# Patient Record
Sex: FEMALE | Race: BLACK OR AFRICAN AMERICAN | NOT HISPANIC OR LATINO | Employment: OTHER | RURAL
[De-identification: names, ages, dates, MRNs, and addresses within clinical notes are randomized per-mention and may not be internally consistent; named-entity substitution may affect disease eponyms.]

---

## 2020-07-23 ENCOUNTER — HISTORICAL (OUTPATIENT)
Dept: ADMINISTRATIVE | Facility: HOSPITAL | Age: 50
End: 2020-07-23

## 2020-08-08 ENCOUNTER — HISTORICAL (OUTPATIENT)
Dept: ADMINISTRATIVE | Facility: HOSPITAL | Age: 50
End: 2020-08-08

## 2020-08-09 LAB — SARS-COV+SARS-COV-2 AG RESP QL IA.RAPID: POSITIVE

## 2020-10-26 ENCOUNTER — HISTORICAL (OUTPATIENT)
Dept: ADMINISTRATIVE | Facility: HOSPITAL | Age: 50
End: 2020-10-26

## 2020-10-26 LAB
ANION GAP SERPL CALCULATED.3IONS-SCNC: 6 MMOL/L (ref 7–16)
BASOPHILS # BLD AUTO: 0.03 X10E3/UL (ref 0–0.2)
BASOPHILS NFR BLD AUTO: 0.6 % (ref 0–1)
BUN SERPL-MCNC: 13 MG/DL (ref 7–18)
CALCIUM SERPL-MCNC: 8.8 MG/DL (ref 8.5–10.1)
CHLORIDE SERPL-SCNC: 108 MMOL/L (ref 98–107)
CO2 SERPL-SCNC: 32 MMOL/L (ref 21–32)
CREAT SERPL-MCNC: 0.92 MG/DL (ref 0.5–1.02)
EOSINOPHIL # BLD AUTO: 0.11 X10E3/UL (ref 0–0.5)
EOSINOPHIL NFR BLD AUTO: 2.2 % (ref 1–4)
ERYTHROCYTE [DISTWIDTH] IN BLOOD BY AUTOMATED COUNT: 13.8 % (ref 11.5–14.5)
GLUCOSE SERPL-MCNC: 78 MG/DL (ref 74–106)
HCT VFR BLD AUTO: 39.1 % (ref 38–47)
HGB BLD-MCNC: 12.3 G/DL (ref 12–16)
IMM GRANULOCYTES # BLD AUTO: 0.02 X10E3/UL (ref 0–0.04)
IMM GRANULOCYTES NFR BLD: 0.4 % (ref 0–0.4)
LYMPHOCYTES # BLD AUTO: 1.57 X10E3/UL (ref 1–4.8)
LYMPHOCYTES NFR BLD AUTO: 32 % (ref 27–41)
MCH RBC QN AUTO: 27.5 PG (ref 27–31)
MCHC RBC AUTO-ENTMCNC: 31.5 G/DL (ref 32–36)
MCV RBC AUTO: 87.5 FL (ref 80–96)
MONOCYTES # BLD AUTO: 0.3 X10E3/UL (ref 0–0.8)
MONOCYTES NFR BLD AUTO: 6.1 % (ref 2–6)
MPC BLD CALC-MCNC: 11.2 FL (ref 9.4–12.4)
NEUTROPHILS # BLD AUTO: 2.88 X10E3/UL (ref 1.8–7.7)
NEUTROPHILS NFR BLD AUTO: 58.7 % (ref 53–65)
NRBC # BLD AUTO: 0 X10E3/UL (ref 0–0)
NRBC, AUTO (.00): 0 /100 (ref 0–0)
PLATELET # BLD AUTO: 225 X10E3/UL (ref 150–400)
POTASSIUM SERPL-SCNC: 3.8 MMOL/L (ref 3.5–5.1)
RBC # BLD AUTO: 4.47 X10E6/UL (ref 4.2–5.4)
SODIUM SERPL-SCNC: 142 MMOL/L (ref 136–145)
TSH SERPL DL<=0.005 MIU/L-ACNC: 0.81 UIU/ML (ref 0.36–3.74)
WBC # BLD AUTO: 4.91 X10E3/UL (ref 4.5–11)

## 2021-01-14 ENCOUNTER — HISTORICAL (OUTPATIENT)
Dept: ADMINISTRATIVE | Facility: HOSPITAL | Age: 51
End: 2021-01-14

## 2021-01-16 LAB
REPORT: NO GROWTH
REPORT: NORMAL

## 2021-04-07 ENCOUNTER — HISTORICAL (OUTPATIENT)
Dept: ADMINISTRATIVE | Facility: HOSPITAL | Age: 51
End: 2021-04-07

## 2021-04-07 LAB
25(OH)D3 SERPL-MCNC: 12.1 NG/ML
ALBUMIN SERPL BCP-MCNC: 3.8 G/DL (ref 3.5–5)
ALP SERPL-CCNC: 81 U/L (ref 41–108)
ALT SERPL W P-5'-P-CCNC: 27 U/L (ref 13–56)
ANION GAP SERPL CALCULATED.3IONS-SCNC: 10 MMOL/L (ref 7–16)
AST SERPL W P-5'-P-CCNC: 17 U/L (ref 15–37)
BASOPHILS # BLD AUTO: 0.03 X10E3/UL (ref 0–0.2)
BASOPHILS NFR BLD AUTO: 0.7 % (ref 0–1)
BILIRUB DIRECT SERPL-MCNC: 0.1 MG/DL (ref 0–0.2)
BILIRUB SERPL-MCNC: 0.4 MG/DL (ref 0–1.2)
BUN SERPL-MCNC: 17 MG/DL (ref 7–18)
CALCIUM SERPL-MCNC: 8.8 MG/DL (ref 8.5–10.1)
CHLORIDE SERPL-SCNC: 107 MMOL/L (ref 98–107)
CHOLEST SERPL-MCNC: 243 MG/DL
CHOLEST/HDLC SERPL: 3 {RATIO}
CO2 SERPL-SCNC: 29 MMOL/L (ref 21–32)
CREAT SERPL-MCNC: 0.89 MG/DL (ref 0.5–1.02)
EOSINOPHIL # BLD AUTO: 0.07 X10E3/UL (ref 0–0.5)
EOSINOPHIL NFR BLD AUTO: 1.6 % (ref 1–4)
ERYTHROCYTE [DISTWIDTH] IN BLOOD BY AUTOMATED COUNT: 14.2 % (ref 11.5–14.5)
GLUCOSE SERPL-MCNC: 79 MG/DL (ref 74–106)
HCT VFR BLD AUTO: 40.8 % (ref 38–47)
HDLC SERPL-MCNC: 80 MG/DL
HGB BLD-MCNC: 12.9 G/DL (ref 12–16)
IMM GRANULOCYTES # BLD AUTO: 0 X10E3/UL (ref 0–0.04)
IMM GRANULOCYTES NFR BLD: 0 % (ref 0–0.4)
LDLC SERPL CALC-MCNC: 150 MG/DL
LYMPHOCYTES # BLD AUTO: 1.79 X10E3/UL (ref 1–4.8)
LYMPHOCYTES NFR BLD AUTO: 40.8 % (ref 27–41)
MCH RBC QN AUTO: 27.5 PG (ref 27–31)
MCHC RBC AUTO-ENTMCNC: 31.6 G/DL (ref 32–36)
MCV RBC AUTO: 87 FL (ref 80–96)
MONOCYTES # BLD AUTO: 0.31 X10E3/UL (ref 0–0.8)
MONOCYTES NFR BLD AUTO: 7.1 % (ref 2–6)
MPC BLD CALC-MCNC: 11.3 FL (ref 9.4–12.4)
NEUTROPHILS # BLD AUTO: 2.19 X10E3/UL (ref 1.8–7.7)
NEUTROPHILS NFR BLD AUTO: 49.8 % (ref 53–65)
NRBC # BLD AUTO: 0 X10E3/UL (ref 0–0)
NRBC, AUTO (.00): 0 /100 (ref 0–0)
PLATELET # BLD AUTO: 257 X10E3/UL (ref 150–400)
POTASSIUM SERPL-SCNC: 3.7 MMOL/L (ref 3.5–5.1)
PROT SERPL-MCNC: 8.1 G/DL (ref 6.4–8.2)
RBC # BLD AUTO: 4.69 X10E6/UL (ref 4.2–5.4)
SODIUM SERPL-SCNC: 142 MMOL/L (ref 136–145)
TRIGL SERPL-MCNC: 67 MG/DL
TSH SERPL DL<=0.005 MIU/L-ACNC: 0.92 UIU/ML (ref 0.36–3.74)
VIT B12 SERPL-MCNC: 862 PG/ML (ref 193–986)
WBC # BLD AUTO: 4.39 X10E3/UL (ref 4.5–11)

## 2021-04-09 LAB
REPORT: NORMAL

## 2021-07-08 ENCOUNTER — OFFICE VISIT (OUTPATIENT)
Dept: FAMILY MEDICINE | Facility: CLINIC | Age: 51
End: 2021-07-08
Payer: COMMERCIAL

## 2021-07-08 VITALS
WEIGHT: 237 LBS | BODY MASS INDEX: 35.92 KG/M2 | HEIGHT: 68 IN | OXYGEN SATURATION: 99 % | DIASTOLIC BLOOD PRESSURE: 94 MMHG | TEMPERATURE: 97 F | HEART RATE: 62 BPM | SYSTOLIC BLOOD PRESSURE: 132 MMHG

## 2021-07-08 DIAGNOSIS — M15.9 GENERALIZED OA: ICD-10-CM

## 2021-07-08 DIAGNOSIS — J01.01 ACUTE RECURRENT MAXILLARY SINUSITIS: Primary | ICD-10-CM

## 2021-07-08 PROCEDURE — 99213 OFFICE O/P EST LOW 20 MIN: CPT | Mod: 25,,, | Performed by: FAMILY MEDICINE

## 2021-07-08 PROCEDURE — 96372 THER/PROPH/DIAG INJ SC/IM: CPT | Mod: ,,, | Performed by: FAMILY MEDICINE

## 2021-07-08 PROCEDURE — 99213 PR OFFICE/OUTPT VISIT, EST, LEVL III, 20-29 MIN: ICD-10-PCS | Mod: 25,,, | Performed by: FAMILY MEDICINE

## 2021-07-08 PROCEDURE — 96372 PR INJECTION,THERAP/PROPH/DIAG2ST, IM OR SUBCUT: ICD-10-PCS | Mod: ,,, | Performed by: FAMILY MEDICINE

## 2021-07-08 RX ORDER — CEFUROXIME AXETIL 250 MG/1
250 TABLET ORAL EVERY 12 HOURS
Qty: 20 TABLET | Refills: 0 | Status: SHIPPED | OUTPATIENT
Start: 2021-07-08

## 2021-07-08 RX ORDER — KETOROLAC TROMETHAMINE 30 MG/ML
60 INJECTION, SOLUTION INTRAMUSCULAR; INTRAVENOUS
Status: COMPLETED | OUTPATIENT
Start: 2021-07-08 | End: 2021-07-08

## 2021-07-08 RX ORDER — LISINOPRIL AND HYDROCHLOROTHIAZIDE 10; 12.5 MG/1; MG/1
1 TABLET ORAL DAILY
COMMUNITY
Start: 2021-03-09

## 2021-07-08 RX ORDER — METHYLPREDNISOLONE ACETATE 80 MG/ML
40 INJECTION, SUSPENSION INTRA-ARTICULAR; INTRALESIONAL; INTRAMUSCULAR; SOFT TISSUE
Status: COMPLETED | OUTPATIENT
Start: 2021-07-08 | End: 2021-07-08

## 2021-07-08 RX ORDER — TRAZODONE HYDROCHLORIDE 50 MG/1
1 TABLET ORAL NIGHTLY
COMMUNITY
Start: 2021-06-04

## 2021-07-08 RX ORDER — DEXAMETHASONE SODIUM PHOSPHATE 4 MG/ML
4 INJECTION, SOLUTION INTRA-ARTICULAR; INTRALESIONAL; INTRAMUSCULAR; INTRAVENOUS; SOFT TISSUE
Status: COMPLETED | OUTPATIENT
Start: 2021-07-08 | End: 2021-07-08

## 2021-07-08 RX ADMIN — METHYLPREDNISOLONE ACETATE 40 MG: 80 INJECTION, SUSPENSION INTRA-ARTICULAR; INTRALESIONAL; INTRAMUSCULAR; SOFT TISSUE at 09:07

## 2021-07-08 RX ADMIN — KETOROLAC TROMETHAMINE 60 MG: 30 INJECTION, SOLUTION INTRAMUSCULAR; INTRAVENOUS at 09:07

## 2021-07-08 RX ADMIN — DEXAMETHASONE SODIUM PHOSPHATE 4 MG: 4 INJECTION, SOLUTION INTRA-ARTICULAR; INTRALESIONAL; INTRAMUSCULAR; INTRAVENOUS; SOFT TISSUE at 09:07

## 2021-08-16 ENCOUNTER — HOSPITAL ENCOUNTER (OUTPATIENT)
Dept: RADIOLOGY | Facility: HOSPITAL | Age: 51
Discharge: HOME | End: 2021-08-16
Payer: COMMERCIAL

## 2021-08-16 VITALS — WEIGHT: 237 LBS | BODY MASS INDEX: 35.92 KG/M2 | HEIGHT: 68 IN

## 2021-08-16 DIAGNOSIS — Z12.31 VISIT FOR SCREENING MAMMOGRAM: ICD-10-CM

## 2021-08-16 PROCEDURE — 77067 SCR MAMMO BI INCL CAD: CPT | Mod: TC

## 2022-11-16 DIAGNOSIS — K21.9 GASTROESOPHAGEAL REFLUX DISEASE WITHOUT ESOPHAGITIS: Primary | ICD-10-CM

## 2022-11-16 DIAGNOSIS — K21.9 GASTROESOPHAGEAL REFLUX DISEASE, UNSPECIFIED WHETHER ESOPHAGITIS PRESENT: Primary | ICD-10-CM

## 2022-11-16 DIAGNOSIS — R11.0 NAUSEA: ICD-10-CM

## 2022-11-18 ENCOUNTER — HOSPITAL ENCOUNTER (OUTPATIENT)
Dept: RADIOLOGY | Facility: HOSPITAL | Age: 52
Discharge: HOME | End: 2022-11-18
Attending: FAMILY MEDICINE
Payer: COMMERCIAL

## 2022-11-18 DIAGNOSIS — R11.0 NAUSEA: ICD-10-CM

## 2022-11-18 DIAGNOSIS — K21.9 GASTROESOPHAGEAL REFLUX DISEASE WITHOUT ESOPHAGITIS: ICD-10-CM

## 2022-11-18 PROCEDURE — 76705 ECHO EXAM OF ABDOMEN: CPT | Mod: TC

## 2023-02-06 DIAGNOSIS — M25.511 CHRONIC RIGHT SHOULDER PAIN: Primary | ICD-10-CM

## 2023-02-06 DIAGNOSIS — G89.29 CHRONIC RIGHT SHOULDER PAIN: Primary | ICD-10-CM

## 2023-03-03 DIAGNOSIS — M25.511 RIGHT SHOULDER PAIN, UNSPECIFIED CHRONICITY: Primary | ICD-10-CM

## 2023-03-07 ENCOUNTER — HOSPITAL ENCOUNTER (OUTPATIENT)
Dept: RADIOLOGY | Facility: HOSPITAL | Age: 53
Discharge: HOME | End: 2023-03-07
Attending: ORTHOPAEDIC SURGERY
Payer: COMMERCIAL

## 2023-03-07 ENCOUNTER — OFFICE VISIT (OUTPATIENT)
Dept: ORTHOPEDICS | Facility: CLINIC | Age: 53
End: 2023-03-07
Payer: COMMERCIAL

## 2023-03-07 VITALS — BODY MASS INDEX: 33.18 KG/M2 | WEIGHT: 237 LBS | HEIGHT: 71 IN

## 2023-03-07 DIAGNOSIS — G89.29 CHRONIC RIGHT SHOULDER PAIN: ICD-10-CM

## 2023-03-07 DIAGNOSIS — M25.511 RIGHT SHOULDER PAIN, UNSPECIFIED CHRONICITY: ICD-10-CM

## 2023-03-07 DIAGNOSIS — M75.40 IMPINGEMENT SYNDROME OF SHOULDER REGION, UNSPECIFIED LATERALITY: Primary | ICD-10-CM

## 2023-03-07 DIAGNOSIS — M25.511 CHRONIC RIGHT SHOULDER PAIN: ICD-10-CM

## 2023-03-07 PROCEDURE — 73030 X-RAY EXAM OF SHOULDER: CPT | Mod: 26,RT,, | Performed by: ORTHOPAEDIC SURGERY

## 2023-03-07 PROCEDURE — 99204 OFFICE O/P NEW MOD 45 MIN: CPT | Mod: S$PBB,,, | Performed by: ORTHOPAEDIC SURGERY

## 2023-03-07 PROCEDURE — 4010F PR ACE/ARB THEARPY RXD/TAKEN: ICD-10-PCS | Mod: CPTII,,, | Performed by: ORTHOPAEDIC SURGERY

## 2023-03-07 PROCEDURE — 4010F ACE/ARB THERAPY RXD/TAKEN: CPT | Mod: CPTII,,, | Performed by: ORTHOPAEDIC SURGERY

## 2023-03-07 PROCEDURE — 3008F BODY MASS INDEX DOCD: CPT | Mod: CPTII,,, | Performed by: ORTHOPAEDIC SURGERY

## 2023-03-07 PROCEDURE — 3008F PR BODY MASS INDEX (BMI) DOCUMENTED: ICD-10-PCS | Mod: CPTII,,, | Performed by: ORTHOPAEDIC SURGERY

## 2023-03-07 PROCEDURE — 73030 XR SHOULDER COMPLETE 2 OR MORE VIEWS RIGHT: ICD-10-PCS | Mod: 26,RT,, | Performed by: ORTHOPAEDIC SURGERY

## 2023-03-07 PROCEDURE — 99213 OFFICE O/P EST LOW 20 MIN: CPT | Mod: PBBFAC | Performed by: ORTHOPAEDIC SURGERY

## 2023-03-07 PROCEDURE — 99204 PR OFFICE/OUTPT VISIT, NEW, LEVL IV, 45-59 MIN: ICD-10-PCS | Mod: S$PBB,,, | Performed by: ORTHOPAEDIC SURGERY

## 2023-03-07 PROCEDURE — 73030 X-RAY EXAM OF SHOULDER: CPT | Mod: TC,RT

## 2023-03-07 RX ORDER — NAPROXEN 500 MG/1
500 TABLET ORAL 2 TIMES DAILY WITH MEALS
Qty: 60 TABLET | Refills: 3 | Status: SHIPPED | OUTPATIENT
Start: 2023-03-07

## 2023-03-07 NOTE — PATIENT INSTRUCTIONS
"    Shoulder Impingement/Rotator Cuff Tendinitis  One of the most common physical complaints is shoulder pain. Your shoulder is made up of several joints combined with tendons and muscles that allow a great range of motion in your arm. Because so many different structures make up the shoulder, it is vulnerable to many different problems. The rotator cuff is a frequent source of pain in the shoulder.    Anatomy  Your shoulder is made up of three bones: your upper arm bone (humerus), your shoulder blade (scapula), and your collarbone (clavicle).  Your arm is kept in your shoulder socket by your rotator cuff. These muscles and tendons form a covering around the head of your upper arm bone and attach it to your shoulder blade.  There is a lubricating sac called a bursa between the rotator cuff and the bone on top of your shoulder (acromion). The bursa allows the rotator cuff tendons to glide freely when you move your arm.                                                                               Normal anatomy of the shoulder.     Description  The rotator cuff is a common source of pain in the shoulder. Pain can be the result of:  Tendinitis. The rotator cuff tendons can be irritated or damaged.   Bursitis. The bursa can become inflamed and swell with more fluid causing pain.   Impingement. When you raise your arm to shoulder height, the space between the acromion and rotator cuff narrows. The acromion can rub against (or "impinge" on) the tendon and the bursa, causing irritation and pain.                                                           The acromion "impinges" on the rotator cuff and bursa.     Cause  Rotator cuff pain is common in both young athletes and middle-aged people. Young athletes who use their arms overhead for swimming, baseball, and tennis are particularly vulnerable. Those who do repetitive lifting or overhead activities using the arm, such as paper hanging, construction, or painting are also " "susceptible.  Pain may also develop as the result of a minor injury. Sometimes, it occurs with no apparent cause.  Symptoms  Rotator cuff pain commonly causes local swelling and tenderness in the front of the shoulder. You may have pain and stiffness when you lift your arm. There may also be pain when the arm is lowered from an elevated position.  Beginning symptoms may be mild. Patients frequently do not seek treatment at an early stage. These symptoms may include:  Minor pain that is present both with activity and at rest   Pain radiating from the front of the shoulder to the side of the arm   Sudden pain with lifting and reaching movements   Athletes in overhead sports may have pain when throwing or serving a tennis ball   As the problem progresses, the symptoms increase:  Pain at night   Loss of strength and motion   Difficulty doing activities that place the arm behind the back, such as buttoning or zippering   If the pain comes on suddenly, the shoulder may be severely tender. All movement may be limited and painful.  Doctor Examination  Medical History and Physical Examination                    Your doctor will test your range of motion by having you move your arm in different directions.   Reproduced with permission from YOLI Lopez, ed: Essentials of Musculoskeletal Care, ed 4. [place], [place], [employer], 2010.     After discussing your symptoms and medical history, your doctor will examine your shoulder. He or she will check to see whether it is tender in any area or whether there is a deformity. To measure the range of motion of your shoulder, your doctor will have you move your arm in several different directions. He or she will also test your arm strength.  Your doctor will check for other problems with your shoulder joint. He or she may also examine your neck to make sure that the pain is not coming from a "pinched nerve," and to rule out other conditions, such as " "arthritis.  Imaging Tests  Other tests which may help your doctor confirm your diagnosis include:  X-rays. Becauses x-rays do not show the soft tissues of your shoulder like the rotator cuff, plain x-rays of a shoulder with rotator cuff pain are usually normal or may show a small bone spur. A special x-ray view, called an "outlet view," sometimes will show a small bone spur on the front edge of the acromion.    (Left) Normal outlet view x-ray. (Right) Abnormal outlet view showing a large bone spur causing impingement on the rotator cuff.   Magnetic resonance imaging (MRI) and ultrasound. These studies can create better images of soft tissues like the rotator cuff tendons. They can show fluid or inflammation in the bursa and rotator cuff. In some cases, partial tearing of the rotator cuff will be seen.  Treatment  The goal of treatment is to reduce pain and restore function. In planning your treatment, your doctor will consider your age, activity level, and general health.  Nonsurgical Treatment  In most cases, initial treatment is nonsurgical. Although nonsurgical treatment may take several weeks to months, many patients experience a gradual improvement and return to function.  Rest. Your doctor may suggest rest and activity modification, such as avoiding overhead activities.  Non-steroidal anti-inflammatory medicines. Drugs like ibuprofen and naproxen reduce pain and swelling.  Physical therapy. A physical therapist will initially focus on restoring normal motion to your shoulder. Stretching exercises to improve range of motion are very helpful. If you have difficulty reaching behind your back, you may have developed tightness of the posterior capsule of the shoulder (capsule refers to the inner lining of the shoulder and posterior refers to the back of the shoulder). Specific stretching of the posterior capsule can be very effective in relieving pain in the shoulder.  Once your pain is improving, your therapist " can start you on a strengthening program for the rotator cuff muscles.  Steroid injection. If rest, medications, and physical therapy do not relieve your pain, an injection of a local anesthetic and a cortisone preparation may be helpful. Cortisone is a very effective anti-inflammatory medicine. Injecting it into the bursa beneath the acromion can relieve pain.                                                     A cortisone injection may relieve painful symptoms.   Reproduced with permission from YOLI Lopez, ed: Essentials of Musculoskeletal Care, ed 4. [place], [employer], 2010.   Surgical Treatment  When nonsurgical treatment does not relieve pain, your doctor may recommend surgery.  The goal of surgery is to create more space for the rotator cuff. To do this, your doctor will remove the inflamed portion of the bursa. He or she may also perform an anterior acromioplasty, in which part of the acromion is removed. This is also known as a subacromial decompression. These procedures can be performed using either an arthroscopic or open technique.  Arthroscopic technique. In arthroscopy, thin surgical instruments are inserted into two or three small puncture wounds around your shoulder. Your doctor examines your shoulder through a fiberoptic scope connected to a television camera. He or she guides the small instruments using a video monitor, and removes bone and soft tissue. In most cases, the front edge of the acromion is removed along with some of the bursal tissue.  Your surgeon may also treat other conditions present in the shoulder at the time of surgery. These can include arthritis between the clavicle (collarbone) and the acromion (acromioclavicular arthritis), inflammation of the biceps tendon (biceps tendonitis), or a partial rotator cuff tear.  Open surgical technique. In open surgery, your doctor will make a small incision in the front of your shoulder. This allows your  doctor to see the acromion and rotator cuff directly.                          Anterior acromioplasty techniques. (Left) Arthroscopic repair. (Right) Open surgical procedure.   Rehabilitation. After surgery, your arm may be placed in a sling for a short period of time. This allows for early healing. As soon as your comfort allows, your doctor will remove the sling to begin exercise and use of the arm.  Your doctor will provide a rehabilitation program based on your needs and the findings at surgery. This will include exercises to regain range of motion of the shoulder and strength of the arm. It typically takes 2 to 4 months to achieve complete relief of pain, but it may take up to a year.    Rotator Cuff Exercises      Before you start  The exercises described below can help you strengthen the muscles in your shoulder (especially the muscles of the rotator cuff--the part that helps circular motion). These exercises should not cause you pain. If you feel any pain, stop exercising. Start again with a lighter weight.    Look at the pictures with each exercise so you can use the correct position. Warm up before adding weights. To warm up, stretch your arms and shoulders, and do pendulum exercises. To do pendulum exercises, bend from the waist, letting your arms hang down. Keep your arm and shoulder muscles relaxed, and move your arms slowly back and forth. Perform the exercises slowly: Lift your arm to a slow count of 3 and lower your arm to a slow count of 6.    Keep repeating each of the following exercises until your arm is tired. Use a light enough weight that you don't get tired until you've done the exercise about 20 to 30 times. Increase the weight a little each week (but never so much that the weight causes pain). Start with 2 ounces the first week. Move up to 4 ounces the second week, 8 ounces the next week and so on.    Each time you finish doing all 4 exercises, put an ice pack on your shoulder for 20  minutes. It's best to use a plastic bag with ice cubes in it or a bag of frozen peas, not gel packs. If you do all 4 exercises 3 to 5 times a week, your rotator cuff muscles will become stronger, and you'll get back normal strength in your shoulder.    Exercise 1   Start by lying on your stomach on a table or a bed. Put your left arm out at shoulder level with your elbow bent to 90° and your hand down. Keep your elbow bent, and slowly raise your left hand. Stop when your hand is level with your shoulder. Lower your hand slowly. Repeat the exercise until your arm is tired. Then do the exercise with your right arm.      Exercise 2   Lie on your right side with a rolled-up towel under your right armpit. Stretch your right arm above your head. Keep your left arm at your side with your elbow bent to 90° and the forearm resting against your chest, palm down. Roll your left shoulder out, raising the left forearm until it's level with your shoulder. (Hint: This is like the backhand swing in tennis.) Lower the arm slowly. Repeat the exercise until your arm is tired. Then do the exercise with your right arm.    Exercise 3  Lie on your right side. Keep your left arm along the upper side of your body. Bend your right elbow to 90°. Keep the right forearm resting on the table. Now roll your right shoulder in, raising your right forearm up to your chest. (Hint: This is like the ericka swing in tennis.) Lower the forearm slowly. Repeat the exercise until your arm is tired. Then do the exercise with your left arm.      Exercise 4  In a standing position, start with your right arm custodial between the front and side of your body, thumb down. (You may need to raise your left arm for balance.) Raise your right arm until almost level (about a 45° angle). (Hint: This is like emptying a can.) Don't lift beyond the point of pain. Slowly lower your arm. Repeat the exercise until your arm is tired. Then do the exercise with your left  arm.          Copyright © [employer] 2008

## 2023-03-07 NOTE — PROGRESS NOTES
HPI:   Mary Johansne is a pleasant 52 y.o. patient who reports to clinic for evaluation of right shoulder pain.  Dr Segura gave her an injection about 5 weeks ago. SHe had some relief.Pt states her pain is worse at night, severe in the last month. She is very stiff in the morning. Now, the pain is so severe that she states she have begun to drop things..     Injury onset and description: none  Patient's occupation: Cook  This is not a work related injury.   she has not had formal physical therapy  she has had previous shoulder injections.   she has not had advanced imaging such as MRI.   The shoulder pain worsens with activity and overhead motion. Pain is disruptive to sleep at night. The pain is better with rest. Treatment thus far has included rest and activity modification. Here today to discuss diagnosis and treatment options.   VAS Pain Scale:  5  SANE Score: 50    PAST MEDICAL HISTORY:   Past Medical History:   Diagnosis Date    Hypertension      PAST SURGICAL HISTORY:   Past Surgical History:   Procedure Laterality Date    HYSTERECTOMY  2005    TONSILLECTOMY       MEDICATIONS:    Current Outpatient Medications:     cefUROXime (CEFTIN) 250 MG tablet, Take 1 tablet (250 mg total) by mouth every 12 (twelve) hours., Disp: 20 tablet, Rfl: 0    lisinopriL-hydrochlorothiazide (PRINZIDE,ZESTORETIC) 10-12.5 mg per tablet, Take 1 tablet by mouth once daily., Disp: , Rfl:     traZODone (DESYREL) 50 MG tablet, Take 1 tablet by mouth every evening., Disp: , Rfl:   ALLERGIES:   Review of patient's allergies indicates:   Allergen Reactions    Sulfa (sulfonamide antibiotics) Anaphylaxis     REVIEW OF SYSTEMS:  Constitution: Negative. Negative for chills, fever and night sweats. HENT: Negative for congestion and headaches.  Eyes: Negative for blurred vision, left vision loss and right vision loss. Cardiovascular: Negative for chest pain and syncope. Respiratory: Negative for cough and shortness of breath.  "      PHYSICAL EXAM:  VITAL SIGNS: Ht 5' 11" (1.803 m)   Wt 107.5 kg (237 lb)   BMI 33.05 kg/m²   General: Well-developed well-nourished 52 y.o. femalein no acute distress;Cardiovascular: Regular rhythm by palpation of distal pulse, normal color and temperature, no concerning varicosities on symptomatic side Lungs: No labored breathing or wheezing appreciated Neuro: Alert and oriented ×3 Psychiatric: well oriented to person, place and time, demonstrates normal mood and affect Skin: No rashes, lesions or ulcers, normal temperature, turgor, and texture on uninvolved extremity    General    Nursing note and vitals reviewed.  Constitutional: She is oriented to person, place, and time. She appears well-developed and well-nourished. No distress.   HENT:   Head: Normocephalic and atraumatic.   Eyes: Pupils are equal, round, and reactive to light.   Neck: Neck supple.   Cardiovascular:  Normal rate and intact distal pulses.            Pulmonary/Chest: Breath sounds normal. No respiratory distress. She exhibits no tenderness.   Abdominal: Soft. Bowel sounds are normal.   Neurological: She is alert and oriented to person, place, and time. She has normal reflexes. She displays normal reflexes. No cranial nerve deficit. She exhibits normal muscle tone.   Psychiatric: She has a normal mood and affect. Her behavior is normal. Judgment and thought content normal.         Right Shoulder Exam     Inspection/Observation   Swelling: absent  Bruising: absent  Scapular Dyskinesia: positive    Tenderness   The patient is tender to palpation of the greater tuberosity, acromion and acromioclavicular joint.    Range of Motion   Active abduction:  abnormal   Passive abduction:  abnormal   Forward Flexion:  abnormal   Forward Elevation: abnormal  External Rotation 90 degrees: normal    Tests & Signs   Apprehension: negative  Cross arm: positive  Drop arm: negative  Guerrero test: positive  Impingement: positive  Rotator Cuff Painful " Arc/Range: mild  Lag Sign 90 degrees: negative  Lift Off Sign: positive  Belly Press: positive  Active Compression Test (Denver's Sign): positive  Jerk Test: negative    Other   Sensation: normal    Comments:  Pain with dynamic labral shear test.  There is pain and weakness with Whipple testing.     Left Shoulder Exam   Left shoulder exam is normal.       Muscle Strength   Right Upper Extremity   Shoulder External Rotation: 4/5   Supraspinatus: 4/5   Subscapularis: 4/5   Biceps: 4/5     Reflexes     Right Side   Biceps:  2+  Right Asencio's Sign:  absent    Vascular Exam     Right Pulses      Radial:                    2+      Capillary Refill  Right Hand: normal capillary refill          IMAGING:  X-ray Shoulder 2 or More Views Right    Result Date: 3/7/2023  See Procedure Notes for results. IMPRESSION: Please see Ortho procedure notes for report.  This procedure was auto-finalized by: [facility]    X-rays of the right shoulder were taken today. No glenohumeral osteoarthritis is seen. There is evidence of moderate acromioclavicular joint osteoarthritis.  Type II acromion is noted.       ASSESSMENT:      ICD-10-CM ICD-9-CM   1. Impingement syndrome of shoulder region, unspecified laterality  M75.40 726.2   2. Chronic right shoulder pain  M25.511 719.41    G89.29 338.29       PLAN:     -Findings and treatment options were discussed with the patient  -All questions answered  Given the above exam findings, I suspect the patient has a rotator cuff tear or possibly an injury to the biceps labral complex. I have ordered and reviewed her shoulder xrays today and performed a thorough exam.  We talked about conservative measures to date as well. She really is in quite a bit of pain, and I suspect an injury to either the rotator cuff or biceps labral complex. Typical operative and nonoperative treatment measures were reviewed in great detail today. Will order 6 weeks of PT for Rt shoulder and see back at that  point    Will order Naproxen as well  See back in 6 weeks      There are no Patient Instructions on file for this visit.  No orders of the defined types were placed in this encounter.    Procedures

## 2023-03-07 NOTE — PROGRESS NOTES
Clinical Information for [facility] Authorization     Dates of Services: 03/08/2023 to 04/07/2023  Discharge Plan: Patient will be discharged from skilled physical therapy treatment once all goals have been met, patient has plateaued, or physician/insurance requests discontinuation of care. Patient will be discharged with a home exercise program.   Type of therapy: Rehabilitative  ICD-10 Diagnosis Code(s): M25.511  Which side is symptomatic? Right  Surgical: No  Surgical procedure: N/A  Surgery date: N/A.  Presenting symptoms/diagnoses are repetitive in nature.  Presenting symptoms are non-radiating in nature.   The rehabilitation is not related to a diagnosis of cancer.  The rehabilitation is not related to a diagnosis of lymphedema.  Patient's clinical presentation is:  Moderate objective and functional deficits: consistent symptoms and/or symptoms that are intensified with activity with moderate loss of range of motion, strength, or ability to perform daily tasks  CPT Codes Requested:  58490 [therapeutic exercise], 29556 [manual therapy], 01183 [therapeutic activities], and 53591 [unattended electrical stimulation]

## 2023-03-08 ENCOUNTER — CLINICAL SUPPORT (OUTPATIENT)
Dept: REHABILITATION | Facility: HOSPITAL | Age: 53
End: 2023-03-08
Payer: COMMERCIAL

## 2023-03-08 DIAGNOSIS — G89.29 CHRONIC RIGHT SHOULDER PAIN: ICD-10-CM

## 2023-03-08 DIAGNOSIS — M25.511 CHRONIC RIGHT SHOULDER PAIN: ICD-10-CM

## 2023-03-08 PROCEDURE — 97161 PT EVAL LOW COMPLEX 20 MIN: CPT

## 2023-03-08 NOTE — PLAN OF CARE
[facility]   Physical Therapy Initial Evaluation    Name: Mary Johansen  Clinic Number: [id number]    Therapy Diagnosis: No diagnosis found.  Physician: Jg Driscoll MD    Physician Orders: PT Eval and Treat   Medical Diagnosis from Referral: chronic R shoulder pain   Evaluation Date: 3/8/2023  Authorization Period Expiration: 3/8/2024  Plan of Care Expiration: 0404/2023  Visit # / Visits authorized: 1/8    Time In: 9:07   Time Out: 9:21  Total Appointment Time (timed & untimed codes): 14 minutes    Precautions: Standard    Subjective   Date of onset: Approximately 6 months ago    History of current condition - Mary reports: gradual onset of R shoulder pain about six months ago that has progressively worsened and begun to limit her activity tolerance. Patient reports the R shoulder pain radiates down to her elbow and occasionally feels like muscle spasms. Patient reports increased pain at night that limits her sleep. Patient reports that she has to complete PT to have an MRI of R shoulder.      Medical History:   Past Medical History:   Diagnosis Date    Hypertension        Surgical History:   Mary Johansen  has a past surgical history that includes Hysterectomy (2005) and Tonsillectomy.    Medications:   Mary has a current medication list which includes the following prescription(s): cefuroxime, lisinopril-hydrochlorothiazide, naproxen, and trazodone.    Allergies:   Review of patient's allergies indicates:   Allergen Reactions    Sulfa (sulfonamide antibiotics) Anaphylaxis        Imaging, X-rays: arthritis and bone spurs     Prior Therapy: None   Social History:  lives with their family  Occupation: Restaurant owner  Prior Level of Function: Independent   Current Level of Function: independent with pain     Pain:  Current 0/10, worst 8/10, best 0/10   Location: right shoulder and arm    Description: Aching, Burning, Throbbing, and Sharp  Aggravating Factors: Lifting and  "increased use of R UE   Easing Factors: pain medication, hot bath, rest, and topical creams     Pts goals: "I need to be able to use this arm at work. "     Objective     Posture: rounded shoulders yes, forward head yes, increased kyphotic curve yes, decreased lordotic curve no  Clear cervical spine: Spurling's test negative    Range of motion  Motion Right  Left    Shoulder flexion WITHIN FUNCTIONAL LIMITS with painful arc between 100-120 degrees WITHIN FUNCTIONAL LIMITS   Shoulder extension WITHIN FUNCTIONAL LIMITS WITHIN FUNCTIONAL LIMITS   Shoulder abduction WITHIN FUNCTIONAL LIMITS WITHIN FUNCTIONAL LIMITS   Shoulder internal rotation C4 functional reach  T2 Functional reach    Shoulder external rotation L3 functional reach  T10 Functional reach    Elbow flexion WITHIN FUNCTIONAL LIMITS WITHIN FUNCTIONAL LIMITS   Elbow extension WITHIN FUNCTIONAL LIMITS WITHIN FUNCTIONAL LIMITS       MANUAL MUSCLE TEST  Muscle Right  Left    Shoulder flexion MMT strength: 3+/5 MMT strength: 4+/5   Shoulder extension MMT strength: 3+/5 MMT strength: 4+/5   Shoulder abduction MMT strength: 3+/5 MMT strength: 4+/5   Shoulder internal rotation MMT strength: 3+/5 MMT strength: 4+/5   Shoulder external rotation MMT strength: 3+/5 MMT strength: 4+/5   Elbow flexion MMT strength: 3+/5 MMT strength: 4+/5   Elbow extension MMT strength: 3+/5 MMT strength: 4+/5   Wrist flexion MMT strength: 4/5 MMT strength: 4+/5   Wrist extension MMT strength: 4/5 MMT strength: 4+/5   Ulnar deviation MMT strength: 4/5 MMT strength: 4+/5   Radial deviation MMT strength: 4/5 MMT strength: 4+/5    right 15 mm Hg left 20 mm Hg     Functional ability:  Dressing: AMB PT LEVEL OF ASSISTANCE: independent  Driving: AMB PT LEVEL OF ASSISTANCE: independent  Overhead activity: AMB PT LEVEL OF ASSISTANCE: independent  Work/hobbies: AMB PT LEVEL OF ASSISTANCE: independent      Clinical test:  Apprehension test: negative  Neer's test: positive  Scour test: " positive  O'lars's test: negative  Drop arm test: negative  Empty can test: positive  Hawkin's edwin test: positive      Palpation: patient reports TOP of R AC joint and bicep tendon. Increased tissue tightness in R upper trapezius, levator, and pec muscles       TREATMENT   No treatment provided today due to insurance requiring prior approval of evaluation.     Assessment   Mary is a 52 y.o. female referred to outpatient Physical Therapy with a medical diagnosis of chronic R shoulder pain. Pt presents with R shoulder pain, decreased R UE muscle strength, impaired posture, and muscle tightness. Patient's impairments have begun to limit her overall activity tolerance to ADLs and work related tasks.     Pt prognosis is Fair.   Pt will benefit from skilled outpatient Physical Therapy to address the deficits stated above and in the chart below, provide pt/family education, and to maximize pt's level of independence.     Plan of care discussed with patient: Yes  Pt's spiritual, cultural and educational needs considered and patient is agreeable to the plan of care and goals as stated below:     Anticipated Barriers for therapy: compliance with Home exercise program and treatment, chronicity of pain, guarding, presence of bone spurs       Goals:  Pt will increase R shoulder active range of motion to WFLs with no pain for improved quality of life.   Pt will increase R upper extremity to 4/5 to allow patient to perform activities of daily living independently  Pt will report decrease in pain to 4/10 at worst to improve quality of life.  Pt will place 5 pound object in overhead shelf without hike and with less than or equal to 4/10 pain to allow patient to return to prior level of function.        Plan   Plan of care Certification: 3/8/2023 to 04/07/2023.    Outpatient Physical Therapy 2 times weekly for 4 weeks to include the following interventions: Electrical Stimulation unattended, Manual Therapy, Moist Heat/ Ice,  Patient Education, Therapeutic Activities, and Therapeutic Exercise.     Alfredo Christine, PT, DPT

## 2023-03-16 ENCOUNTER — CLINICAL SUPPORT (OUTPATIENT)
Dept: REHABILITATION | Facility: HOSPITAL | Age: 53
End: 2023-03-16
Payer: COMMERCIAL

## 2023-03-16 DIAGNOSIS — M25.511 CHRONIC RIGHT SHOULDER PAIN: Primary | ICD-10-CM

## 2023-03-16 DIAGNOSIS — G89.29 CHRONIC RIGHT SHOULDER PAIN: Primary | ICD-10-CM

## 2023-03-16 PROCEDURE — 97110 THERAPEUTIC EXERCISES: CPT | Mod: CQ

## 2023-03-16 PROCEDURE — 97014 ELECTRIC STIMULATION THERAPY: CPT | Mod: CQ

## 2023-03-16 NOTE — PROGRESS NOTES
" [facility]   Physical Therapy Treatment Note     Name: Mary Johansen  Clinic Number: [id number]    Therapy Diagnosis: No diagnosis found.  Physician: Jg Driscoll MD    Visit Date: 3/16/2023    Physician Orders: PT Eval and Treat   Medical Diagnosis from Referral: chronic R shoulder pain   Evaluation Date: 3/8/2023  Authorization Period Expiration: 3/8/2024  Plan of Care Expiration: 04/04/2023  Visit # / Visits authorized: 2/8     Time In: 14:05   Time Out: 14:53  Total Appointment Time (timed & untimed codes): 48 minutes     Precautions: Standard    PTA Visit #: 1/5       SUBJECTIVE   Pt reports: "My shoulder is a little sore".    She was compliant with home exercise program.  Response to previous treatment: N/A  Functional change: Too early in POC    Pain: 5/10  Location: right shoulder      OBJECTIVE     Objective Measures updated at progress report unless specified.     Treatment       Ther-Ex    Pulleys 4/4   Ball Rolls 10 x 10"   Finger Ladder 5 x 10"   Scap retractions 20 x 3"   Upper Trap Stretch 3 x 30"   Levator Scap Stretch 3 x 30"   Cane flexion stretch 10 x 10"   Cane ER stretch 5 x 10"   Sleeper stretch 5 x 10"       Ther-Act    I,Y,T's    Prone Rows    Prone Abduction    Prone Extension          SUPERVISED MODALITIES after being cleared for contraindications:  IFC Electrical Stimulation:  Mary received IFC Electrical Stimulation for pain control applied to the R shoulder. Pt received stimulation for 10 minutes. Mary tolderated treatment well without any adverse effects.       MHP for 10 minutes to R shoulder with IFC .      Patient Education and Home Exercises     Home Exercises Provided and Patient Education Provided     Education provided:   - Home exercise program, Plan of Care, Delayed onset muscle soreness     Written Home Exercises Provided: Patient instructed to cont prior HEP. Exercises were reviewed and Mary was able to demonstrate them prior " "to the end of the session.  Mary demonstrated good  understanding of the education provided. See EMR under Patient Instructions for exercises provided during therapy sessions    ASSESSMENT   Mary is a 52 y.o. female referred to outpatient Physical Therapy with a medical diagnosis of chronic R shoulder pain. Pt presents with R shoulder pain, decreased R UE muscle strength, impaired posture, and muscle tightness. Patient's impairments have begun to limit her overall activity tolerance to ADLs and work related tasks.  Pt displays mild pain symptoms throughout tx and c/o stiffness. LPTA prompted pt with visual and verbal cues to progress through tx with proper posture, count, hold times, and decreased compensations. Pt states "it's popping during eccentric motion of cane flexion stretches. No adverse effects noted.     Pt prognosis is Fair.   Pt will benefit from skilled outpatient Physical Therapy to address the deficits stated above and in the chart below, provide pt/family education, and to maximize pt's level of independence.      Plan of care discussed with patient: Yes  Pt's spiritual, cultural and educational needs considered and patient is agreeable to the plan of care and goals as stated below:      Anticipated Barriers for therapy: compliance with Home exercise program and treatment, chronicity of pain, guarding, presence of bone spurs         Goals:  Pt will increase R shoulder active range of motion to WFLs with no pain for improved quality of life.   Pt will increase R upper extremity to 4/5 to allow patient to perform activities of daily living independently  Pt will report decrease in pain to 4/10 at worst to improve quality of life.  Pt will place 5 pound object in overhead shelf without hike and with less than or equal to 4/10 pain to allow patient to return to prior level of function.   received the treatments listed below:      PLAN   Continue POC per PT orders to progress patient toward " rehab goals.   GRZEGORZ Bella  3/16/2023

## 2023-03-17 ENCOUNTER — CLINICAL SUPPORT (OUTPATIENT)
Dept: REHABILITATION | Facility: HOSPITAL | Age: 53
End: 2023-03-17
Payer: COMMERCIAL

## 2023-03-17 DIAGNOSIS — G89.29 CHRONIC RIGHT SHOULDER PAIN: Primary | ICD-10-CM

## 2023-03-17 DIAGNOSIS — M25.511 CHRONIC RIGHT SHOULDER PAIN: Primary | ICD-10-CM

## 2023-03-17 PROCEDURE — 97014 ELECTRIC STIMULATION THERAPY: CPT | Mod: CQ

## 2023-03-17 PROCEDURE — 97140 MANUAL THERAPY 1/> REGIONS: CPT | Mod: CQ

## 2023-03-17 PROCEDURE — 97110 THERAPEUTIC EXERCISES: CPT | Mod: CQ

## 2023-03-17 NOTE — PROGRESS NOTES
" [facility]   Physical Therapy Treatment Note     Name: Mary Johansen  Clinic Number: [id number]    Therapy Diagnosis:   Encounter Diagnosis   Name Primary?    Chronic right shoulder pain Yes     Physician: Jg Driscoll MD    Visit Date: 3/17/2023    Physician Orders: PT Eval and Treat   Medical Diagnosis from Referral: chronic R shoulder pain   Evaluation Date: 3/8/2023  Authorization Period Expiration: 3/8/2024  Plan of Care Expiration: 04/04/2023  Visit # / Visits authorized: 3/8     Time In: 9:00  Time Out: 9:59  Total Appointment Time (timed & untimed codes): 59 minutes      Precautions: Standard    PTA Visit #: 2/5      SUBJECTIVE     Pt reports:  "My shoulder is popping when I move it a certain way", and patient reports feeling tightness in bilateral neck, bilateral shoulders, and between shoulder blades.     She was compliant with home exercise program.  Response to previous treatment: N/A  Functional change: Too early in POC    Pain: 3/10  Location: right shoulder      OBJECTIVE     Objective Measures updated at progress report unless specified.     Treatment     THERAPEUTIC EXERCISES to improve strength, ROM, activity tolerance, and flexibility.  See flow-sheet below.  Added Rhomboid stretch, UBE, and supine Pec stretch.     Ther-Ex    Pulleys 5/5 *   Ball Rolls 10 x 10"   Finger Ladder 5 x 10"   Scap retractions 20 x 3"   Rhomboid stretch  3 x 20" *   Supine Pec Stretch  3 x 20" *    Upper Trap Stretch 3 x 30"   Levator Scap Stretch 3 x 30"   Cane flexion stretch 10 x 10"   Cane ER stretch 5 x 10"   Sleeper stretch 5 x 10"       Ther-Act    I,Y,T's    Prone Rows    Prone Abduction    Prone Extension          SUPERVISED MODALITIES after being cleared for contraindications:  IFC Electrical Stimulation:  Mary received IFC Electrical Stimulation for pain control applied to the R shoulder. Pt received stimulation for 10 minutes. Mary tolderated treatment well " "without any adverse effects.       MHP for 10 minutes to R shoulder with IFC     STM/MFR to Left and Right:  Upper Traps, Middle Traps, Lower Traps, Rhomboids, and Levators varying light to moderate pressure to decrease MYF adhesions and trigger points.     Patient Education and Home Exercises     Home Exercises Provided and Patient Education Provided     Education provided:   - Home exercise program, Plan of Care, Delayed onset muscle soreness     Written Home Exercises Provided: Patient instructed to cont prior HEP. Exercises were reviewed and Mary was able to demonstrate them prior to the end of the session.  Mary demonstrated good  understanding of the education provided. See EMR under Patient Instructions for exercises provided during therapy sessions    ASSESSMENT   Mary is a 52 y.o. female referred to outpatient Physical Therapy with a medical diagnosis of chronic R shoulder pain. Pt presents with R shoulder pain, decreased R UE muscle strength, impaired posture, and muscle tightness. Patient's impairments have begun to limit her overall activity tolerance to ADLs and work related tasks.  Pt displays mild pain symptoms throughout tx and c/o stiffness. LPTA prompted pt with visual and verbal cues to progress through tx with proper posture, count, hold times, and decreased compensations.  Patient reports "popping" in Right shoulder frequently during completion of There Ex.  Patient without reports of increased Right shoulder pain at end of treatment.          Pt prognosis is Fair.   Pt will benefit from skilled outpatient Physical Therapy to address the deficits stated above and in the chart below, provide pt/family education, and to maximize pt's level of independence.      Plan of care discussed with patient: Yes  Pt's spiritual, cultural and educational needs considered and patient is agreeable to the plan of care and goals as stated below:      Anticipated Barriers for therapy: compliance " with Home exercise program and treatment, chronicity of pain, guarding, presence of bone spurs         Goals:  Pt will increase R shoulder active range of motion to WFLs with no pain for improved quality of life.   Pt will increase R upper extremity to 4/5 to allow patient to perform activities of daily living independently  Pt will report decrease in pain to 4/10 at worst to improve quality of life.  Pt will place 5 pound object in overhead shelf without hike and with less than or equal to 4/10 pain to allow patient to return to prior level of function.   received the treatments listed below:      PLAN       Continue POC per PT orders to progress patient toward rehab goals.   GRZEGORZ Breen   3/17/2023

## 2023-03-23 ENCOUNTER — CLINICAL SUPPORT (OUTPATIENT)
Dept: REHABILITATION | Facility: HOSPITAL | Age: 53
End: 2023-03-23
Payer: COMMERCIAL

## 2023-03-23 DIAGNOSIS — G89.29 CHRONIC RIGHT SHOULDER PAIN: Primary | ICD-10-CM

## 2023-03-23 DIAGNOSIS — M25.511 CHRONIC RIGHT SHOULDER PAIN: Primary | ICD-10-CM

## 2023-03-23 PROCEDURE — 97140 MANUAL THERAPY 1/> REGIONS: CPT

## 2023-03-23 PROCEDURE — 97110 THERAPEUTIC EXERCISES: CPT

## 2023-03-23 NOTE — PROGRESS NOTES
" [facility]   Physical Therapy Treatment Note     Name: Mary Johansen  Clinic Number: [id number]    Therapy Diagnosis:   Encounter Diagnosis   Name Primary?    Chronic right shoulder pain Yes     Physician: Jg Driscoll MD    Visit Date: 3/23/2023    Physician Orders: PT Eval and Treat   Medical Diagnosis from Referral: chronic R shoulder pain   Evaluation Date: 3/8/2023  Authorization Period Expiration: 3/8/2024  Plan of Care Expiration: 04/04/2023  Visit # / Visits authorized: 4/8     Time In: 9:05  Time Out: 9:59  Total Appointment Time (timed & untimed codes): 54 minutes      Precautions: Standard    PTA Visit #: 0/5      SUBJECTIVE     Pt reports:  "I just got back from the beach with kids so you know I am hurting."     She was compliant with home exercise program.  Response to previous treatment: N/A  Functional change: Too early in POC    Pain: 4/10 with movement  Location: right shoulder      OBJECTIVE     Objective Measures updated at progress report unless specified.     Treatment     THERAPEUTIC EXERCISES to improve strength, ROM, activity tolerance, and flexibility.  See flow-sheet below.     Ther-Ex    Pulleys 5/5    Ball Rolls 10 x 10"   Finger Ladder 5 x 10"   Scap retractions 20 x 3"   Rhomboid stretch  3 x 20"    Supine Pec Stretch  3 x 20"    Upper Trap Stretch 3 x 30"   Levator Scap Stretch 3 x 30"   Cane flexion stretch 10 x 10"   Cane ER stretch 5 x 10"   Sleeper stretch 5 x 10"   UBE 3/3       Ther-Act    I,Y,T's    Prone Rows    Prone Abduction    Prone Extension          Not performed----- SUPERVISED MODALITIES after being cleared for contraindications:  IFC Electrical Stimulation:  Mary received IFC Electrical Stimulation for pain control applied to the R shoulder. Pt received stimulation for 10 minutes. Mary tolderated treatment well without any adverse effects.       Not performed------ MHP for 10 minutes to R shoulder with IFC     Not " performed------ STM/MFR to Left and Right:  Upper Traps, Middle Traps, Lower Traps, Rhomboids, and Levators varying light to moderate pressure to decrease MYF adhesions and trigger points.     Joint mobs performed to R AC and SC joint with superior to inferior grade 3-4 mobs and anterior to posterior grade 3-4 mobs. PT performed active neuromuscular releases on R subclavius with mod decrease in muscle irritability    Patient Education and Home Exercises     Home Exercises Provided and Patient Education Provided     Education provided:   - Home exercise program, Plan of Care, Delayed onset muscle soreness     Written Home Exercises Provided: Patient instructed to cont prior HEP. Exercises were reviewed and Mary was able to demonstrate them prior to the end of the session.  Mary demonstrated good  understanding of the education provided. See EMR under Patient Instructions for exercises provided during therapy sessions    ASSESSMENT   Mary is a 52 y.o. female referred to outpatient Physical Therapy with a medical diagnosis of chronic R shoulder pain. Pt presents with R shoulder pain, decreased R UE muscle strength, impaired posture, and muscle tightness. Patient's impairments have begun to limit her overall activity tolerance to ADLs and work related tasks. Patient required mod cuing with therapeutic exercises to decrease scapular compensations with overhead movements and muscle guarding. Patient did not report increased pain during treatment but did report tightness. PT performed manuals to R clavicle with good response of muscle tissue and joint mobility. No adverse effects from treatment.      Pt prognosis is Fair.   Pt will benefit from skilled outpatient Physical Therapy to address the deficits stated above and in the chart below, provide pt/family education, and to maximize pt's level of independence.      Plan of care discussed with patient: Yes  Pt's spiritual, cultural and educational needs  considered and patient is agreeable to the plan of care and goals as stated below:      Anticipated Barriers for therapy: compliance with Home exercise program and treatment, chronicity of pain, guarding, presence of bone spurs         Goals:  Pt will increase R shoulder active range of motion to WFLs with no pain for improved quality of life.   Pt will increase R upper extremity to 4/5 to allow patient to perform activities of daily living independently  Pt will report decrease in pain to 4/10 at worst to improve quality of life.  Pt will place 5 pound object in overhead shelf without hike and with less than or equal to 4/10 pain to allow patient to return to prior level of function.   received the treatments listed below:      PLAN       Continue POC per PT orders to progress patient toward rehab goals.     Analy Camargo, PT, DPT  3/23/2023

## 2023-03-28 ENCOUNTER — CLINICAL SUPPORT (OUTPATIENT)
Dept: REHABILITATION | Facility: HOSPITAL | Age: 53
End: 2023-03-28
Payer: COMMERCIAL

## 2023-03-28 DIAGNOSIS — M25.511 CHRONIC RIGHT SHOULDER PAIN: Primary | ICD-10-CM

## 2023-03-28 DIAGNOSIS — G89.29 CHRONIC RIGHT SHOULDER PAIN: Primary | ICD-10-CM

## 2023-03-28 PROCEDURE — 97110 THERAPEUTIC EXERCISES: CPT | Mod: CQ

## 2023-03-28 NOTE — PROGRESS NOTES
" [facility]   Physical Therapy Treatment Note     Name: Mary Johansen  Clinic Number: [id number]    Therapy Diagnosis:   Encounter Diagnosis   Name Primary?    Chronic right shoulder pain Yes     Physician: Jg Driscoll MD    Visit Date: 3/28/2023    Physician Orders: PT Eval and Treat   Medical Diagnosis from Referral: chronic R shoulder pain   Evaluation Date: 3/8/2023  Authorization Period Expiration: 3/8/2024  Plan of Care Expiration: 04/04/2023  Visit # / Visits authorized: 5/8     Time In: 9:09  Time Out: 10:02  Total Appointment Time (timed & untimed codes): 53 minutes      Precautions: Standard    PTA Visit #: 1/5      SUBJECTIVE   Pt reports:  "I'm feeling good today and I'm not hurting."     She was compliant with home exercise program.  Response to previous treatment: N/A  Functional change: Too early in POC    Pain: 3/10 with movement  Location: right shoulder      OBJECTIVE     Objective Measures updated at progress report unless specified.     Treatment     THERAPEUTIC EXERCISES to improve strength, ROM, activity tolerance, and flexibility.  See flow-sheet below.     Ther-Ex    Pulleys 5/5    Ball Rolls 10 x 10"   Finger Ladder 5 x 10"   Scap retractions 20 x 3"   Rhomboid stretch  3 x 20"    Supine Pec Stretch  3 x 20"    Upper Trap Stretch 3 x 30"   Levator Scap Stretch 3 x 30"   Cane flexion stretch 10 x 10"   Cane ER stretch 5 x 10"   Sleeper stretch 5 x 10"   UBE 3/3       Ther-Act    I,Y,T's    Prone Rows    Prone Abduction    Prone Extension          Not performed----- SUPERVISED MODALITIES after being cleared for contraindications:  IFC Electrical Stimulation:  Mary received IFC Electrical Stimulation for pain control applied to the R shoulder. Pt received stimulation for 10 minutes. Mary tolderated treatment well without any adverse effects.       Not performed------ MHP for 10 minutes to R shoulder with IFC     Not performed------ STM/MFR to Left " and Right:  Upper Traps, Middle Traps, Lower Traps, Rhomboids, and Levators varying light to moderate pressure to decrease MYF adhesions and trigger points.     Not performed------Joint mobs performed to R AC and SC joint with superior to inferior grade 3-4 mobs and anterior to posterior grade 3-4 mobs. PT performed active neuromuscular releases on R subclavius with mod decrease in muscle irritability    Patient Education and Home Exercises     Home Exercises Provided and Patient Education Provided     Education provided:   - Home exercise program, Plan of Care, Delayed onset muscle soreness     Written Home Exercises Provided: Patient instructed to cont prior HEP. Exercises were reviewed and Mary was able to demonstrate them prior to the end of the session.  Mary demonstrated good  understanding of the education provided. See EMR under Patient Instructions for exercises provided during therapy sessions    ASSESSMENT   Mary is a 52 y.o. female referred to outpatient Physical Therapy with a medical diagnosis of chronic R shoulder pain. Pt presents with R shoulder pain, decreased R UE muscle strength, impaired posture, and muscle tightness. Patient's impairments have begun to limit her overall activity tolerance to ADLs and work related tasks. Pt expressed no pain with all therapeutic motions. Pt required occasional cuing for proper form, count, and hold times. Pt declined modalities. No adverse effects from tx.      Pt prognosis is Fair.   Pt will benefit from skilled outpatient Physical Therapy to address the deficits stated above and in the chart below, provide pt/family education, and to maximize pt's level of independence.      Plan of care discussed with patient: Yes  Pt's spiritual, cultural and educational needs considered and patient is agreeable to the plan of care and goals as stated below:      Anticipated Barriers for therapy: compliance with Home exercise program and treatment, chronicity  of pain, guarding, presence of bone spurs         Goals:  Pt will increase R shoulder active range of motion to WFLs with no pain for improved quality of life.   Pt will increase R upper extremity to 4/5 to allow patient to perform activities of daily living independently  Pt will report decrease in pain to 4/10 at worst to improve quality of life.  Pt will place 5 pound object in overhead shelf without hike and with less than or equal to 4/10 pain to allow patient to return to prior level of function.   received the treatments listed below:      PLAN   Continue POC per PT orders to progress patient toward rehab goals.   GRZEGORZ Bella  3/28/2023

## 2023-03-30 ENCOUNTER — CLINICAL SUPPORT (OUTPATIENT)
Dept: REHABILITATION | Facility: HOSPITAL | Age: 53
End: 2023-03-30
Payer: COMMERCIAL

## 2023-03-30 DIAGNOSIS — M25.511 CHRONIC RIGHT SHOULDER PAIN: Primary | ICD-10-CM

## 2023-03-30 DIAGNOSIS — G89.29 CHRONIC RIGHT SHOULDER PAIN: Primary | ICD-10-CM

## 2023-03-30 PROCEDURE — 97014 ELECTRIC STIMULATION THERAPY: CPT

## 2023-03-30 PROCEDURE — 97110 THERAPEUTIC EXERCISES: CPT

## 2023-03-30 NOTE — PROGRESS NOTES
" [facility]   Physical Therapy Treatment Note     Name: Mary Johansen  Clinic Number: [id number]    Therapy Diagnosis:   No diagnosis found.    Physician: Jg Driscoll MD    Visit Date: 3/30/2023    Physician Orders: PT Eval and Treat   Medical Diagnosis from Referral: chronic R shoulder pain   Evaluation Date: 3/8/2023  Authorization Period Expiration: 3/8/2024  Plan of Care Expiration: 04/04/2023  Visit # / Visits authorized: 6/8     Time In: 9:14  Time Out: 10:10  Total Appointment Time (timed & untimed codes): 56 minutes      Precautions: Standard    PTA Visit #: 0/5      SUBJECTIVE   Pt reports:  "I am rushing this morning. I'm not hurting right now. It is just stiff."     She was compliant with home exercise program.  Response to previous treatment: N/A  Functional change: Too early in POC    Pain: 0/10   Location: right shoulder      OBJECTIVE     Objective Measures updated at progress report unless specified.     Treatment     THERAPEUTIC EXERCISES to improve strength, ROM, activity tolerance, and flexibility.  See flow-sheet below.     Ther-Ex    Pulleys 5/5    Ball Rolls 10 x 10"   Finger Ladder 5 x 10" each flexion and ABD   Scap retractions 30 x 3"   Rhomboid stretch  3 x 20"    Supine Pec Stretch  3 x 20"    Upper Trap Stretch 3 x 30"   Levator Scap Stretch 3 x 30"   Cane flexion stretch 10 x 10"   Cane ER stretch 5 x 10"   Sleeper stretch 5 x 10"   UBE 3/3       Ther-Act    I,Y,T's    Prone Rows    Prone Abduction    Prone Extension          SUPERVISED MODALITIES after being cleared for contraindications:  Biphasic Electrical Stimulation:  Mary received electrical stimulation for decreased tissue tightness applied to the R shoulder. Pt received stimulation for 10 minutes. Mary tolderated treatment well without any adverse effects.       MHP for 10 minutes to R shoulder with ESTIM    Patient Education and Home Exercises     Home Exercises Provided and Patient " Education Provided     Education provided:   - Home exercise program, Plan of Care, Delayed onset muscle soreness     Written Home Exercises Provided: Patient instructed to cont prior HEP. Exercises were reviewed and Mary was able to demonstrate them prior to the end of the session.  Mary demonstrated good  understanding of the education provided. See EMR under Patient Instructions for exercises provided during therapy sessions    ASSESSMENT   Mary is a 52 y.o. female referred to outpatient Physical Therapy with a medical diagnosis of chronic R shoulder pain. Pt presents with R shoulder pain, decreased R UE muscle strength, impaired posture, and muscle tightness. PT provided verbal and visual cueing throughout treatment session for increased scapular retraction and stability to decrease R shoulder popping with exercises.  Patient denied pain but continued to report feeling R shoulder stiffness at end of exercises. Patient reported decreased R shoulder stiffness following modalities.      Pt prognosis is Fair.   Pt will benefit from skilled outpatient Physical Therapy to address the deficits stated above and in the chart below, provide pt/family education, and to maximize pt's level of independence.      Plan of care discussed with patient: Yes  Pt's spiritual, cultural and educational needs considered and patient is agreeable to the plan of care and goals as stated below:      Anticipated Barriers for therapy: compliance with Home exercise program and treatment, chronicity of pain, guarding, presence of bone spurs         Goals:  Pt will increase R shoulder active range of motion to WFLs with no pain for improved quality of life.   Pt will increase R upper extremity to 4/5 to allow patient to perform activities of daily living independently  Pt will report decrease in pain to 4/10 at worst to improve quality of life.  Pt will place 5 pound object in overhead shelf without hike and with less than or  equal to 4/10 pain to allow patient to return to prior level of function.   received the treatments listed below:      PLAN   Continue POC per PT orders to progress patient toward rehab goals.   Alfredo Christine, PT, DPT     3/30/2023

## 2023-04-05 ENCOUNTER — CLINICAL SUPPORT (OUTPATIENT)
Dept: REHABILITATION | Facility: HOSPITAL | Age: 53
End: 2023-04-05
Payer: COMMERCIAL

## 2023-04-05 DIAGNOSIS — M25.511 CHRONIC RIGHT SHOULDER PAIN: Primary | ICD-10-CM

## 2023-04-05 DIAGNOSIS — G89.29 CHRONIC RIGHT SHOULDER PAIN: Primary | ICD-10-CM

## 2023-04-05 PROCEDURE — 97110 THERAPEUTIC EXERCISES: CPT

## 2023-04-05 NOTE — PROGRESS NOTES
" [facility]   Physical Therapy Treatment Note     Name: Mary Johansen  Clinic Number: [id number]    Therapy Diagnosis:   Encounter Diagnosis   Name Primary?    Chronic right shoulder pain Yes       Physician: Jg Driscoll MD    Visit Date: 4/5/2023    Physician Orders: PT Eval and Treat   Medical Diagnosis from Referral: chronic R shoulder pain   Evaluation Date: 3/8/2023  Authorization Period Expiration: 3/8/2024  Plan of Care Expiration: 04/04/2023  Visit # / Visits authorized: 7/8     Time In: 8:04  Time Out: 8:48  Total Appointment Time (timed & untimed codes): 44 minutes      Precautions: Standard    PTA Visit #: 0/5      SUBJECTIVE   Pt reports:  "I'm going back to the doctor next week."     She was compliant with home exercise program.  Response to previous treatment: N/A  Functional change: continued pain with increased activity     Pain: 0/10   Location: right shoulder      OBJECTIVE     Objective Measures updated at progress report unless specified.     Treatment     THERAPEUTIC EXERCISES to improve strength, ROM, activity tolerance, and flexibility.  See flow-sheet below.     Ther-Ex    Pulleys 5/5    Ball Rolls 10 x 10"   Finger Ladder 5 x 10" each flexion and ABD   Scap retractions 30 x 3"   Rhomboid stretch  3 x 20"    Supine Pec Stretch  3 x 20"    Upper Trap Stretch 3 x 30"   Levator Scap Stretch 3 x 30"   Cane flexion stretch 10 x 10"   Cane ER stretch 10 x 10"   Sleeper stretch 10 x 10"   UBE 3/3       Ther-Act    I,Y,T's    Prone Rows    Prone Abduction    Prone Extension            Patient Education and Home Exercises     Home Exercises Provided and Patient Education Provided     Education provided:   - Home exercise program, Plan of Care, Delayed onset muscle soreness     Written Home Exercises Provided: Patient instructed to cont prior HEP. Exercises were reviewed and Mary was able to demonstrate them prior to the end of the session.  Mary" demonstrated good  understanding of the education provided. See EMR under Patient Instructions for exercises provided during therapy sessions    ASSESSMENT   Mary is a 52 y.o. female referred to outpatient Physical Therapy with a medical diagnosis of chronic R shoulder pain. Pt presents with R shoulder pain, decreased R UE muscle strength, impaired posture, and muscle tightness. PT provided verbal and visual cueing throughout treatment session to insure proper understanding for continuation at home with Home exercise program. PT completed patient goal assessment for last scheduled PT visit in this PT plan of care. Patient continues to have pain and popping in R shoulder that limits her activity tolerance. Patient is scheduled to follow up with referring MD next week. PT recommends MRI of R shoulder at this time.      Pt prognosis is Fair.   Pt will benefit from skilled outpatient Physical Therapy to address the deficits stated above and in the chart below, provide pt/family education, and to maximize pt's level of independence.      Plan of care discussed with patient: Yes  Pt's spiritual, cultural and educational needs considered and patient is agreeable to the plan of care and goals as stated below:      Anticipated Barriers for therapy: compliance with Home exercise program and treatment, chronicity of pain, guarding, presence of bone spurs         Goals:  Pt will increase R shoulder active range of motion to WFLs with no pain for improved quality of life. -MET   Pt will increase R upper extremity to 4/5 to allow patient to perform activities of daily living independently- NOT MET 3+/5  Pt will report decrease in pain to 4/10 at worst to improve quality of life.-NOT MET   Pt will place 5 pound object in overhead shelf without hike and with less than or equal to 4/10 pain to allow patient to return to prior level of function. -NOT MET     PLAN   Discharge to Home exercise program   Alfredo Christine, PT, DPT      4/5/2023

## 2023-04-18 ENCOUNTER — OFFICE VISIT (OUTPATIENT)
Dept: ORTHOPEDICS | Facility: CLINIC | Age: 53
End: 2023-04-18
Payer: COMMERCIAL

## 2023-04-18 DIAGNOSIS — M75.40 IMPINGEMENT SYNDROME OF SHOULDER REGION, UNSPECIFIED LATERALITY: Primary | ICD-10-CM

## 2023-04-18 PROCEDURE — 99212 OFFICE O/P EST SF 10 MIN: CPT | Mod: PBBFAC | Performed by: ORTHOPAEDIC SURGERY

## 2023-04-18 PROCEDURE — 4010F ACE/ARB THERAPY RXD/TAKEN: CPT | Mod: CPTII,,, | Performed by: ORTHOPAEDIC SURGERY

## 2023-04-18 PROCEDURE — 4010F PR ACE/ARB THEARPY RXD/TAKEN: ICD-10-PCS | Mod: CPTII,,, | Performed by: ORTHOPAEDIC SURGERY

## 2023-04-18 PROCEDURE — 99213 OFFICE O/P EST LOW 20 MIN: CPT | Mod: S$PBB,,, | Performed by: ORTHOPAEDIC SURGERY

## 2023-04-18 PROCEDURE — 99213 PR OFFICE/OUTPT VISIT, EST, LEVL III, 20-29 MIN: ICD-10-PCS | Mod: S$PBB,,, | Performed by: ORTHOPAEDIC SURGERY

## 2023-04-18 NOTE — PROGRESS NOTES
Mary Johansen returns to clinic for a follow up visit regarding     ICD-10-CM ICD-9-CM   1. Impingement syndrome of shoulder region, unspecified laterality  M75.40 726.2       Pt states therapy really has helped, but her pain is worse at night.      PAST MEDICAL HISTORY:   Past Medical History:   Diagnosis Date    Hypertension      PAST SURGICAL HISTORY:   Past Surgical History:   Procedure Laterality Date    HYSTERECTOMY  2005    TONSILLECTOMY           PHYSICAL EXAMINATION:  General    Nursing note and vitals reviewed.  Constitutional: She is oriented to person, place, and time. She appears well-developed and well-nourished. No distress.   HENT:   Head: Normocephalic and atraumatic.   Eyes: Pupils are equal, round, and reactive to light.   Neck: Neck supple.   Cardiovascular:  Normal rate and intact distal pulses.            Pulmonary/Chest: Breath sounds normal. No respiratory distress. She exhibits no tenderness.   Abdominal: Soft. Bowel sounds are normal.   Neurological: She is alert and oriented to person, place, and time. She has normal reflexes. She displays normal reflexes. No cranial nerve deficit. She exhibits normal muscle tone.   Psychiatric: She has a normal mood and affect. Her behavior is normal. Judgment and thought content normal.         Right Shoulder Exam     Inspection/Observation   Swelling: absent  Bruising: absent  Scapular Dyskinesia: positive    Tenderness   The patient is tender to palpation of the greater tuberosity, acromion and acromioclavicular joint.    Range of Motion   Active abduction:  abnormal   Passive abduction:  abnormal   Forward Flexion:  abnormal   Forward Elevation: abnormal  External Rotation 90 degrees: normal    Tests & Signs   Apprehension: negative  Cross arm: positive  Drop arm: negative  Guerrero test: positive  Impingement: positive  Rotator Cuff Painful Arc/Range: mild  Lag Sign 90 degrees: negative  Lift Off Sign: positive  Belly Press:  positive  Active Compression Test ([place]'s Sign): positive  Jerk Test: negative    Other   Sensation: normal    Comments:  Pain with dynamic labral shear test.  There is pain and weakness with Whipple testing.     Left Shoulder Exam   Left shoulder exam is normal.       Muscle Strength   Right Upper Extremity   Shoulder External Rotation: 4/5   Supraspinatus: 4/5   Subscapularis: 4/5   Biceps: 4/5     Reflexes     Right Side   Biceps:  2+  Right Asencio's Sign:  absent    Vascular Exam     Right Pulses      Radial:                    2+      Capillary Refill  Right Hand: normal capillary refill          IMAGING:  No results found.       ASSESSMENT:      ICD-10-CM ICD-9-CM   1. Impingement syndrome of shoulder region, unspecified laterality  M75.40 726.2       PLAN:     -Findings and treatment options were discussed with the patient  -All questions answered  Natural history and expected course discussed. Questions answered.  Educational material distributed.  Given the above exam findings, I suspect the patient has a rotator cuff tear or possibly an injury to the biceps labral complex. I have ordered and reviewed her shoulder xrays today and performed a thorough exam.  We talked about conservative measures to date as well. She really is in quite a bit of pain, and I suspect an injury to either the rotator cuff or biceps labral complex. Typical operative and nonoperative treatment measures were reviewed in great detail today.  Plan is to proceed with an MRI to assess for internal derangement. Will follow-up after study to discuss results. Will reconsider treatment options at that time.         There are no Patient Instructions on file for this visit.    Orders Placed This Encounter   Procedures    MRI Shoulder Without Contrast Right       Procedures

## 2023-04-25 ENCOUNTER — HOSPITAL ENCOUNTER (OUTPATIENT)
Dept: RADIOLOGY | Facility: HOSPITAL | Age: 53
Discharge: HOME | End: 2023-04-25
Attending: ORTHOPAEDIC SURGERY
Payer: COMMERCIAL

## 2023-04-25 DIAGNOSIS — M75.40 IMPINGEMENT SYNDROME OF SHOULDER REGION, UNSPECIFIED LATERALITY: ICD-10-CM

## 2023-05-01 DIAGNOSIS — M75.40 IMPINGEMENT SYNDROME OF SHOULDER REGION, UNSPECIFIED LATERALITY: Primary | ICD-10-CM

## 2023-05-30 ENCOUNTER — OFFICE VISIT (OUTPATIENT)
Dept: ORTHOPEDICS | Facility: CLINIC | Age: 53
End: 2023-05-30
Payer: COMMERCIAL

## 2023-05-30 DIAGNOSIS — M75.100 TEAR OF ROTATOR CUFF, UNSPECIFIED LATERALITY, UNSPECIFIED TEAR EXTENT, UNSPECIFIED WHETHER TRAUMATIC: Primary | ICD-10-CM

## 2023-05-30 PROCEDURE — 4010F ACE/ARB THERAPY RXD/TAKEN: CPT | Mod: ,,, | Performed by: ORTHOPAEDIC SURGERY

## 2023-05-30 PROCEDURE — 99212 OFFICE O/P EST SF 10 MIN: CPT | Mod: PBBFAC | Performed by: ORTHOPAEDIC SURGERY

## 2023-05-30 PROCEDURE — 99214 PR OFFICE/OUTPT VISIT, EST, LEVL IV, 30-39 MIN: ICD-10-PCS | Mod: S$PBB,,, | Performed by: ORTHOPAEDIC SURGERY

## 2023-05-30 PROCEDURE — 4010F PR ACE/ARB THEARPY RXD/TAKEN: ICD-10-PCS | Mod: ,,, | Performed by: ORTHOPAEDIC SURGERY

## 2023-05-30 PROCEDURE — 99214 OFFICE O/P EST MOD 30 MIN: CPT | Mod: S$PBB,,, | Performed by: ORTHOPAEDIC SURGERY

## 2023-05-30 NOTE — PROGRESS NOTES
Mary Johansen returns to clinic for a follow up visit regarding     ICD-10-CM ICD-9-CM   1. Tear of rotator cuff, unspecified laterality, unspecified tear extent, unspecified whether traumatic  M75.100 840.4       Pt is here for MRI f/u and treatment moving forward.      PAST MEDICAL HISTORY:   Past Medical History:   Diagnosis Date    Hypertension      PAST SURGICAL HISTORY:   Past Surgical History:   Procedure Laterality Date    HYSTERECTOMY  2005    TONSILLECTOMY           PHYSICAL EXAMINATION:  General    Nursing note and vitals reviewed.  Constitutional: She is oriented to person, place, and time. She appears well-developed and well-nourished. No distress.   HENT:   Head: Normocephalic and atraumatic.   Eyes: Pupils are equal, round, and reactive to light.   Neck: Neck supple.   Cardiovascular:  Normal rate and intact distal pulses.            Pulmonary/Chest: Breath sounds normal. No respiratory distress. She exhibits no tenderness.   Abdominal: Soft. Bowel sounds are normal.   Neurological: She is alert and oriented to person, place, and time. She has normal reflexes. She displays normal reflexes. No cranial nerve deficit. She exhibits normal muscle tone.   Psychiatric: She has a normal mood and affect. Her behavior is normal. Judgment and thought content normal.         Right Shoulder Exam     Inspection/Observation   Swelling: absent  Bruising: absent  Scapular Dyskinesia: positive    Tenderness   The patient is tender to palpation of the greater tuberosity, acromion and acromioclavicular joint.    Range of Motion   Active abduction:  abnormal   Passive abduction:  abnormal   Forward Flexion:  abnormal   Forward Elevation: abnormal  External Rotation 90 degrees: normal    Tests & Signs   Apprehension: negative  Cross arm: positive  Drop arm: negative  Guerrero test: positive  Impingement: positive  Rotator Cuff Painful Arc/Range: mild  Lag Sign 90 degrees: negative  Lift Off Sign: positive  Belly  Press: positive  Active Compression Test ([place]'s Sign): positive  Jerk Test: negative    Other   Sensation: normal    Comments:  Pain with dynamic labral shear test.  There is pain and weakness with Whipple testing.     Left Shoulder Exam   Left shoulder exam is normal.       Muscle Strength   Right Upper Extremity   Shoulder External Rotation: 4/5   Supraspinatus: 4/5   Subscapularis: 4/5   Biceps: 4/5     Reflexes     Right Side   Biceps:  2+  Right Asencio's Sign:  absent    Vascular Exam     Right Pulses      Radial:                    2+      Capillary Refill  Right Hand: normal capillary refill          IMAGING:  I reviewed her MRI of her right shoulder which confirms the presence of a full-thickness tear of the supraspinatus tendon.  There was a large anteriorly based osteophyte complex along the AC joint.  There is also irregularity and flattening seen of the biceps tendon  ASSESSMENT:      ICD-10-CM ICD-9-CM   1. Tear of rotator cuff, unspecified laterality, unspecified tear extent, unspecified whether traumatic  M75.100 840.4       PLAN:     -Findings and treatment options were discussed with the patient  -All questions answered  Natural history and expected course discussed. Questions answered.  Educational material distributed.  Reduction in offending activity.    MRI findings were reviewed with the patient.  The patient has a symptomatic full-thickness rotator cuff tear with associated impingement findings.  Muscle quality is well maintained.  Treatment options were reviewed to include both operative and nonoperative measures.  The patient has failed an initial course of conservative treatment.  Given the extent and duration of the patient's complaints, operative intervention is certainly reasonable at this point.  The details of arthroscopic rotator cuff repair with subacromial decompression, biceps tenotomy versus tenodesis, labral debridement, distal clavicle excision, and possible Regeneten patch  augmentation were discussed.    The patient understands the likely convalescence after surgery, in particular the expected postop rehab and recovery course. Alternatives both operative and non-operative with associated risks and benefits discussed. The outlined risks and potential complications of the proposed procedure include but are not limited to: infection, poor wound healing, scarring, stiffness, swelling, continued or recurrent pain, instability, hardware or prosthetic failure, symptomatic hardware requiring removal, weakness, neurovascular injury, numbness, chronic regional pain disorder, tissue nonhealing/irreparability/retear, contralateral ligament injury, chondral injury, arthritis, fracture, blood clot formation, inability to return to previous level of activity, anesthetic or regional block complication up to death, need for additional procedure as indicated, and potential need for further surgery.     she was also informed and understands the risks of surgery are greater for patients with a current condition or history of heart disease, obesity, clotting disorders, recurrent infections, steroid use, current or past smoking, and factors such as sedentary lifestyle and noncompliance with medications, therapy or follow-up. The degree of the increased risk is hard to estimate with any degree of precision.    All questions were answered. The patient has verbalized understanding of these issues and wishes to proceed as discussed. The patient understands that recovery time can be up to 6-12 months.        There are no Patient Instructions on file for this visit.    No orders of the defined types were placed in this encounter.      Procedures

## 2023-05-31 ENCOUNTER — TELEPHONE (OUTPATIENT)
Dept: ORTHOPEDICS | Facility: CLINIC | Age: 53
End: 2023-05-31
Payer: COMMERCIAL

## 2023-05-31 DIAGNOSIS — Z01.818 PREPROCEDURAL EXAMINATION: Primary | ICD-10-CM

## 2023-05-31 DIAGNOSIS — M75.100 TEAR OF ROTATOR CUFF, UNSPECIFIED LATERALITY, UNSPECIFIED TEAR EXTENT, UNSPECIFIED WHETHER TRAUMATIC: Primary | ICD-10-CM

## 2023-05-31 DIAGNOSIS — M75.101 TEAR OF RIGHT ROTATOR CUFF: ICD-10-CM

## 2023-05-31 RX ORDER — SODIUM CHLORIDE 9 MG/ML
INJECTION, SOLUTION INTRAVENOUS CONTINUOUS
Status: CANCELLED | OUTPATIENT
Start: 2023-05-31

## 2023-05-31 RX ORDER — MUPIROCIN 20 MG/G
OINTMENT TOPICAL
Status: CANCELLED | OUTPATIENT
Start: 2023-05-31

## 2023-05-31 NOTE — TELEPHONE ENCOUNTER
----- Message from Alyson Umanzor sent at 5/31/2023  2:33 PM CDT -----  Pt calling to set up surgery - call back # [phone] or [phone]

## 2023-06-05 DIAGNOSIS — Z12.11 SPECIAL SCREENING FOR MALIGNANT NEOPLASMS, COLON: Primary | ICD-10-CM

## 2023-06-05 DIAGNOSIS — Z12.31 SCREENING MAMMOGRAM FOR HIGH-RISK PATIENT: Primary | ICD-10-CM

## 2023-06-05 DIAGNOSIS — Z12.12 SCREENING FOR MALIGNANT NEOPLASM OF THE RECTUM: ICD-10-CM

## 2023-06-06 DIAGNOSIS — Z12.11 SCREENING FOR COLON CANCER: Primary | ICD-10-CM

## 2023-06-06 RX ORDER — POLYETHYLENE GLYCOL 3350, SODIUM SULFATE ANHYDROUS, SODIUM BICARBONATE, SODIUM CHLORIDE, POTASSIUM CHLORIDE 236; 22.74; 6.74; 5.86; 2.97 G/4L; G/4L; G/4L; G/4L; G/4L
4 POWDER, FOR SOLUTION ORAL ONCE
Qty: 4000 ML | Refills: 0 | Status: SHIPPED | OUTPATIENT
Start: 2023-06-06 | End: 2023-06-06

## 2023-06-19 ENCOUNTER — HOSPITAL ENCOUNTER (EMERGENCY)
Facility: HOSPITAL | Age: 53
Discharge: HOME | End: 2023-06-19
Attending: INTERNAL MEDICINE
Payer: COMMERCIAL

## 2023-06-19 VITALS
TEMPERATURE: 99 F | BODY MASS INDEX: 32.93 KG/M2 | WEIGHT: 235.19 LBS | RESPIRATION RATE: 17 BRPM | SYSTOLIC BLOOD PRESSURE: 151 MMHG | HEIGHT: 71 IN | HEART RATE: 68 BPM | OXYGEN SATURATION: 99 % | DIASTOLIC BLOOD PRESSURE: 87 MMHG

## 2023-06-19 DIAGNOSIS — M70.51 BURSITIS OF RIGHT KNEE, UNSPECIFIED BURSA: Primary | ICD-10-CM

## 2023-06-19 DIAGNOSIS — R52 PAIN: ICD-10-CM

## 2023-06-19 DIAGNOSIS — M79.89 LEG SWELLING: ICD-10-CM

## 2023-06-19 LAB
BASOPHILS # BLD AUTO: 0.05 K/UL (ref 0–0.2)
BASOPHILS NFR BLD AUTO: 0.9 % (ref 0–1)
D DIMER PPP FEU-MCNC: 0.46 ΜG/ML (ref 0–0.47)
DIFFERENTIAL METHOD BLD: ABNORMAL
EOSINOPHIL # BLD AUTO: 0.07 K/UL (ref 0–0.5)
EOSINOPHIL NFR BLD AUTO: 1.3 % (ref 1–4)
EOSINOPHIL NFR BLD MANUAL: 1 % (ref 1–4)
ERYTHROCYTE [DISTWIDTH] IN BLOOD BY AUTOMATED COUNT: 14.1 % (ref 11.5–14.5)
HCT VFR BLD AUTO: 36.9 % (ref 38–47)
HGB BLD-MCNC: 12 G/DL (ref 12–16)
HYPOCHROMIA BLD QL SMEAR: ABNORMAL
IMM GRANULOCYTES # BLD AUTO: 0.01 K/UL (ref 0–0.04)
IMM GRANULOCYTES NFR BLD: 0.2 % (ref 0–0.4)
LYMPHOCYTES # BLD AUTO: 1.82 K/UL (ref 1–4.8)
LYMPHOCYTES NFR BLD AUTO: 32.9 % (ref 27–41)
LYMPHOCYTES NFR BLD MANUAL: 33 % (ref 27–41)
MCH RBC QN AUTO: 28.2 PG (ref 27–31)
MCHC RBC AUTO-ENTMCNC: 32.5 G/DL (ref 32–36)
MCV RBC AUTO: 86.8 FL (ref 80–96)
MONOCYTES # BLD AUTO: 0.46 K/UL (ref 0–0.8)
MONOCYTES NFR BLD AUTO: 8.3 % (ref 2–6)
MONOCYTES NFR BLD MANUAL: 5 % (ref 2–6)
MPC BLD CALC-MCNC: 10.3 FL (ref 9.4–12.4)
NEUTROPHILS # BLD AUTO: 3.13 K/UL (ref 1.8–7.7)
NEUTROPHILS NFR BLD AUTO: 56.4 % (ref 53–65)
NEUTS BAND NFR BLD MANUAL: 6 % (ref 1–5)
NEUTS SEG NFR BLD MANUAL: 55 % (ref 50–62)
NRBC BLD MANUAL-RTO: ABNORMAL %
PLATELET # BLD AUTO: 230 K/UL (ref 150–400)
PLATELET MORPHOLOGY: NORMAL
RBC # BLD AUTO: 4.25 M/UL (ref 4.2–5.4)
WBC # BLD AUTO: 5.54 K/UL (ref 4.5–11)

## 2023-06-19 PROCEDURE — 85025 COMPLETE CBC W/AUTO DIFF WBC: CPT | Performed by: INTERNAL MEDICINE

## 2023-06-19 PROCEDURE — 96372 THER/PROPH/DIAG INJ SC/IM: CPT | Performed by: INTERNAL MEDICINE

## 2023-06-19 PROCEDURE — 99284 EMERGENCY DEPT VISIT MOD MDM: CPT

## 2023-06-19 PROCEDURE — 85379 FIBRIN DEGRADATION QUANT: CPT | Performed by: INTERNAL MEDICINE

## 2023-06-19 PROCEDURE — 63600175 PHARM REV CODE 636 W HCPCS: Performed by: INTERNAL MEDICINE

## 2023-06-19 PROCEDURE — 99284 PR EMERGENCY DEPT VISIT,LEVEL IV: ICD-10-PCS | Mod: ,,, | Performed by: INTERNAL MEDICINE

## 2023-06-19 PROCEDURE — 99284 EMERGENCY DEPT VISIT MOD MDM: CPT | Mod: ,,, | Performed by: INTERNAL MEDICINE

## 2023-06-19 RX ORDER — KETOROLAC TROMETHAMINE 15 MG/ML
15 INJECTION, SOLUTION INTRAMUSCULAR; INTRAVENOUS
Status: COMPLETED | OUTPATIENT
Start: 2023-06-19 | End: 2023-06-19

## 2023-06-19 RX ADMIN — KETOROLAC TROMETHAMINE 15 MG: 15 INJECTION, SOLUTION INTRAMUSCULAR; INTRAVENOUS at 09:06

## 2023-06-20 NOTE — ED PROVIDER NOTES
Encounter Date: 6/19/2023       History     Chief Complaint   Patient presents with    Knee Pain     RIGHT    Leg Swelling     RIGHT     Patient states she is had pain in her right knee for the last 2 weeks off and on.  Worse in the morning time when his stiff.  Gets better during the day.  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  She denies having history any falling, previous injuries of the right knee or leg.  She noticed though she is been in [place] all day today and now has swelling of the entire right leg.  No history any previous blood clots.  No chest pain shortness of breath or palpitations.      Review of patient's allergies indicates:   Allergen Reactions    Sulfa (sulfonamide antibiotics) Rash    Pcn [penicillins] Rash     Past Medical History:   Diagnosis Date    Hypertension      Past Surgical History:   Procedure Laterality Date    HYSTERECTOMY  2005    TONSILLECTOMY       Family History   Problem Relation Age of Onset    Hypertension Mother     Arthritis Mother     Hypertension Father     Kidney disease Father      Social History     Tobacco Use    Smoking status: Never    Smokeless tobacco: Never   Substance Use Topics    Alcohol use: Not Currently    Drug use: Never     Review of Systems   Constitutional:  Negative for fever.   HENT:  Negative for sore throat.    Respiratory:  Negative for shortness of breath.    Cardiovascular:  Negative for chest pain.   Gastrointestinal:  Negative for nausea.   Genitourinary:  Negative for dysuria.   Musculoskeletal:  Negative for back pain.   Skin:  Negative for rash.   Neurological:  Negative for weakness.   Hematological:  Does not bruise/bleed easily.     Physical Exam     Initial Vitals [06/19/23 2003]   BP Pulse Resp Temp SpO2   (!) 148/89 78 18 98.6 °F (37 °C) 98 %      MAP       --         Physical Exam    Vitals reviewed.  Constitutional: She appears well-developed.   Eyes: Pupils are equal, round, and reactive to  light.   Neck:   Normal range of motion.  Cardiovascular:  Normal rate.           Pulmonary/Chest: Breath sounds normal.   Abdominal: Abdomen is soft.   Musculoskeletal:         General: Normal range of motion.      Cervical back: Normal range of motion.      Right knee: Swelling present. No deformity. Normal range of motion. Tenderness present.      Left knee: Normal.        Legs:       Comments: There is some swelling of the right lower leg from the knee .  down to the foot.  Calf negative for tenderness neurovascular motor function normal entire leg.  Is tender but no crepitus or bony changes.     Neurological: She is alert.   Skin: Skin is warm.   Psychiatric: She has a normal mood and affect.       Medical Screening Exam   See Full Note    ED Course   Procedures  Labs Reviewed   D DIMER, QUANTITATIVE - Normal   CBC W/ AUTO DIFFERENTIAL    Narrative:     The following orders were created for panel order CBC auto differential.  Procedure                               Abnormality         Status                     ---------                               -----------         ------                     CBC with Differential[[id number]]                            In process                 Manual Differential[[id number]]                              In process                   Please view results for these tests on the individual orders.   CBC WITH DIFFERENTIAL   MANUAL DIFFERENTIAL          Imaging Results              X-Ray Knee 1 or 2 View Right (Final result)  Result time 06/19/23 20:50:32      Final result by Jim Ricks DO (06/19/23 20:50:32)                   Impression:      As above      Electronically signed by: Jim Ricks  Date:    06/19/2023  Time:    20:50               Narrative:    EXAMINATION:  XR KNEE 1 OR 2 VIEW RIGHT    CLINICAL HISTORY:  Pain, unspecified    TECHNIQUE:  XR KNEE 1 OR 2 VIEW RIGHT    COMPARISON:  2014    FINDINGS:  No acute fracture or dislocation.    Moderate narrowing  of the medial compartment, which could be positional.    The mild degenerative change of the lateral compartment and the patellofemoral compartment.    No radiopaque foreign bodies.                                       Medications   ketorolac injection 15 mg (has no administration in time range)     Medical Decision Making:   Initial Assessment:   Patient with right knee pain for 2 weeks and acute swelling of the right leg today.  Differential Diagnosis:   Arthritis or bursitis of the right knee, DVT, Baker's cyst  Clinical Tests:   Lab Tests: Ordered and Reviewed  The following lab test(s) were unremarkable: D-Dimer       <> Summary of Lab: D-dimer normal  Radiological Study: Ordered and Reviewed  ED Management:  Patient with a normal D-dimer which indicates low risk of DVT, x-ray shows degenerative changes in her knee which consistent with probably some arthritic changes.  Patient could have a Baker's cyst.  At this point will treat with Toradol, advised that swelling continues she needs here ultrasound as early as maybe tomorrow.           ED Course as of 06/19/23 2106 Mon Jun 19, 2023 2057 X-Ray Knee 1 or 2 View Right [PW]   2102 D-Dimer: 0.46 [PW]      ED Course User Index  [PW] Luis Alberto Pena MD                Clinical Impression:   Final diagnoses:  [R52] Pain  [M70.51] Bursitis of right knee, unspecified bursa (Primary)  [M79.89] Leg swelling        ED Disposition Condition    Discharge Stable          ED Prescriptions    None       Follow-up Information       Follow up With Specialties Details Why Contact Info    Kaz Lockett MD Family Medicine In 1 day  [address] [place] [id number] [phone]               Luis Alberto Pena MD  06/19/23 2106

## 2023-06-20 NOTE — ED TRIAGE NOTES
"PRESENTS WITH C/O PAIN TO RIGHT KNEE FOR 2 DAYS. STATES RT KNEE FELT "TIGHT" X 2 DAYS AND TODAY PT NOTED SWELLING TO OUTER ASPECT OF RT LOWER LEG. DENIES TRAUMA OR INJURY.   "

## 2023-07-10 ENCOUNTER — CLINICAL SUPPORT (OUTPATIENT)
Dept: CARDIOLOGY | Facility: CLINIC | Age: 53
End: 2023-07-10
Payer: COMMERCIAL

## 2023-07-10 DIAGNOSIS — Z01.818 PREPROCEDURAL EXAMINATION: ICD-10-CM

## 2023-07-10 PROCEDURE — 93005 ELECTROCARDIOGRAM TRACING: CPT | Mod: PBBFAC | Performed by: STUDENT IN AN ORGANIZED HEALTH CARE EDUCATION/TRAINING PROGRAM

## 2023-07-10 PROCEDURE — 93010 ELECTROCARDIOGRAM REPORT: CPT | Mod: S$PBB,,, | Performed by: STUDENT IN AN ORGANIZED HEALTH CARE EDUCATION/TRAINING PROGRAM

## 2023-07-10 PROCEDURE — 93010 EKG 12-LEAD: ICD-10-PCS | Mod: S$PBB,,, | Performed by: STUDENT IN AN ORGANIZED HEALTH CARE EDUCATION/TRAINING PROGRAM

## 2023-07-10 PROCEDURE — 99211 OFF/OP EST MAY X REQ PHY/QHP: CPT | Mod: PBBFAC

## 2023-07-19 ENCOUNTER — ANESTHESIA (OUTPATIENT)
Dept: SURGERY | Facility: HOSPITAL | Age: 53
End: 2023-07-19
Payer: COMMERCIAL

## 2023-07-19 ENCOUNTER — ANESTHESIA EVENT (OUTPATIENT)
Dept: SURGERY | Facility: HOSPITAL | Age: 53
End: 2023-07-19
Payer: COMMERCIAL

## 2023-07-19 ENCOUNTER — HOSPITAL ENCOUNTER (OUTPATIENT)
Facility: HOSPITAL | Age: 53
Discharge: HOME | End: 2023-07-19
Attending: ORTHOPAEDIC SURGERY | Admitting: ORTHOPAEDIC SURGERY
Payer: COMMERCIAL

## 2023-07-19 VITALS
SYSTOLIC BLOOD PRESSURE: 155 MMHG | TEMPERATURE: 98 F | BODY MASS INDEX: 32.34 KG/M2 | RESPIRATION RATE: 20 BRPM | HEART RATE: 82 BPM | HEIGHT: 71 IN | WEIGHT: 231 LBS | DIASTOLIC BLOOD PRESSURE: 95 MMHG | OXYGEN SATURATION: 94 %

## 2023-07-19 DIAGNOSIS — M75.100 TEAR OF ROTATOR CUFF, UNSPECIFIED LATERALITY, UNSPECIFIED TEAR EXTENT, UNSPECIFIED WHETHER TRAUMATIC: ICD-10-CM

## 2023-07-19 DIAGNOSIS — M75.101 TEAR OF RIGHT ROTATOR CUFF: Primary | ICD-10-CM

## 2023-07-19 PROCEDURE — 27000165 HC TUBE, ETT CUFFED: Performed by: NURSE ANESTHETIST, CERTIFIED REGISTERED

## 2023-07-19 PROCEDURE — 63600175 PHARM REV CODE 636 W HCPCS: Performed by: NURSE ANESTHETIST, CERTIFIED REGISTERED

## 2023-07-19 PROCEDURE — 36000711: Performed by: ORTHOPAEDIC SURGERY

## 2023-07-19 PROCEDURE — 25000003 PHARM REV CODE 250: Performed by: NURSE ANESTHETIST, CERTIFIED REGISTERED

## 2023-07-19 PROCEDURE — 63600175 PHARM REV CODE 636 W HCPCS: Performed by: ORTHOPAEDIC SURGERY

## 2023-07-19 PROCEDURE — 29827 SHO ARTHRS SRG RT8TR CUF RPR: CPT | Mod: RT,,, | Performed by: ORTHOPAEDIC SURGERY

## 2023-07-19 PROCEDURE — 27000655: Performed by: NURSE ANESTHETIST, CERTIFIED REGISTERED

## 2023-07-19 PROCEDURE — D9220A PRA ANESTHESIA: Mod: CRNA,,, | Performed by: NURSE ANESTHETIST, CERTIFIED REGISTERED

## 2023-07-19 PROCEDURE — 29826 PR SHLDR ARTHROSCOP,PART ACROMIOPLAS: ICD-10-PCS | Mod: RT,,, | Performed by: ORTHOPAEDIC SURGERY

## 2023-07-19 PROCEDURE — 27000716 HC OXISENSOR PROBE, ANY SIZE: Performed by: NURSE ANESTHETIST, CERTIFIED REGISTERED

## 2023-07-19 PROCEDURE — 27202344 HC EYESHIELD: Performed by: NURSE ANESTHETIST, CERTIFIED REGISTERED

## 2023-07-19 PROCEDURE — 27200750 HC INSULATED NEEDLE/ STIMUPLEX: Performed by: NURSE ANESTHETIST, CERTIFIED REGISTERED

## 2023-07-19 PROCEDURE — 29824 SHO ARTHRS SRG DSTL CLAVICLC: CPT | Mod: 51,RT,, | Performed by: ORTHOPAEDIC SURGERY

## 2023-07-19 PROCEDURE — 29828 PR ARTHROSCOPY SHOULDER SURGICAL BICEPS TENODESIS: ICD-10-PCS | Mod: 51,RT,, | Performed by: ORTHOPAEDIC SURGERY

## 2023-07-19 PROCEDURE — 29806 PR SHLDR ARTHROSCOP,SURG,CAPSULORRHAPHY: ICD-10-PCS | Mod: 59,51,RT, | Performed by: ORTHOPAEDIC SURGERY

## 2023-07-19 PROCEDURE — C9290 INJ, BUPIVACAINE LIPOSOME: HCPCS | Performed by: ANESTHESIOLOGY

## 2023-07-19 PROCEDURE — 29824 PR SHLDR ARTHROSCOP,SURG,DIS CLAVICULECTOMY: ICD-10-PCS | Mod: 51,RT,, | Performed by: ORTHOPAEDIC SURGERY

## 2023-07-19 PROCEDURE — 29827 PR SHLDR ARTHROSCOP,SURG,W/ROTAT CUFF REPR: ICD-10-PCS | Mod: RT,,, | Performed by: ORTHOPAEDIC SURGERY

## 2023-07-19 PROCEDURE — 97161 PT EVAL LOW COMPLEX 20 MIN: CPT

## 2023-07-19 PROCEDURE — D9220A PRA ANESTHESIA: ICD-10-PCS | Mod: ANES,,, | Performed by: ANESTHESIOLOGY

## 2023-07-19 PROCEDURE — 36000710: Performed by: ORTHOPAEDIC SURGERY

## 2023-07-19 PROCEDURE — 27201423 OPTIME MED/SURG SUP & DEVICES STERILE SUPPLY: Performed by: ORTHOPAEDIC SURGERY

## 2023-07-19 PROCEDURE — C1713 ANCHOR/SCREW BN/BN,TIS/BN: HCPCS | Performed by: ORTHOPAEDIC SURGERY

## 2023-07-19 PROCEDURE — 27000510 HC BLANKET BAIR HUGGER ANY SIZE: Performed by: NURSE ANESTHETIST, CERTIFIED REGISTERED

## 2023-07-19 PROCEDURE — 71000015 HC POSTOP RECOV 1ST HR: Performed by: ORTHOPAEDIC SURGERY

## 2023-07-19 PROCEDURE — D9220A PRA ANESTHESIA: ICD-10-PCS | Mod: CRNA,,, | Performed by: NURSE ANESTHETIST, CERTIFIED REGISTERED

## 2023-07-19 PROCEDURE — 37000008 HC ANESTHESIA 1ST 15 MINUTES: Performed by: ORTHOPAEDIC SURGERY

## 2023-07-19 PROCEDURE — 27000689 HC BLADE LARYNGOSCOPE ANY SIZE: Performed by: NURSE ANESTHETIST, CERTIFIED REGISTERED

## 2023-07-19 PROCEDURE — 71000033 HC RECOVERY, INTIAL HOUR: Performed by: ORTHOPAEDIC SURGERY

## 2023-07-19 PROCEDURE — 29828 SHO ARTHRS SRG BICP TENODSIS: CPT | Mod: 51,RT,, | Performed by: ORTHOPAEDIC SURGERY

## 2023-07-19 PROCEDURE — 25000003 PHARM REV CODE 250: Performed by: ORTHOPAEDIC SURGERY

## 2023-07-19 PROCEDURE — 63600175 PHARM REV CODE 636 W HCPCS: Performed by: ANESTHESIOLOGY

## 2023-07-19 PROCEDURE — 29826 SHO ARTHRS SRG DECOMPRESSION: CPT | Mod: RT,,, | Performed by: ORTHOPAEDIC SURGERY

## 2023-07-19 PROCEDURE — 37000009 HC ANESTHESIA EA ADD 15 MINS: Performed by: ORTHOPAEDIC SURGERY

## 2023-07-19 PROCEDURE — D9220A PRA ANESTHESIA: Mod: ANES,,, | Performed by: ANESTHESIOLOGY

## 2023-07-19 PROCEDURE — 29806 SHO ARTHRS SRG CAPSULORRAPHY: CPT | Mod: 59,51,RT, | Performed by: ORTHOPAEDIC SURGERY

## 2023-07-19 DEVICE — BIO-COMP SWVLK C, CLD 4.75X19.1MM
Type: IMPLANTABLE DEVICE | Site: SHOULDER | Status: FUNCTIONAL
Brand: ARTHREX®

## 2023-07-19 DEVICE — IMPLANTABLE DEVICE: Type: IMPLANTABLE DEVICE | Site: SHOULDER | Status: FUNCTIONAL

## 2023-07-19 DEVICE — ANCHOR SUTURE Q FIX 1.8MM: Type: IMPLANTABLE DEVICE | Site: SHOULDER | Status: FUNCTIONAL

## 2023-07-19 RX ORDER — ONDANSETRON 4 MG/1
8 TABLET, ORALLY DISINTEGRATING ORAL EVERY 8 HOURS PRN
Status: DISCONTINUED | OUTPATIENT
Start: 2023-07-19 | End: 2023-07-19 | Stop reason: HOSPADM

## 2023-07-19 RX ORDER — OXYCODONE AND ACETAMINOPHEN 10; 325 MG/1; MG/1
1 TABLET ORAL EVERY 6 HOURS PRN
Qty: 30 TABLET | Refills: 0 | Status: SHIPPED | OUTPATIENT
Start: 2023-07-19 | End: 2023-07-26

## 2023-07-19 RX ORDER — SODIUM CHLORIDE 0.9 % (FLUSH) 0.9 %
2 SYRINGE (ML) INJECTION
Status: DISCONTINUED | OUTPATIENT
Start: 2023-07-19 | End: 2023-07-19 | Stop reason: HOSPADM

## 2023-07-19 RX ORDER — MEPERIDINE HYDROCHLORIDE 25 MG/ML
25 INJECTION INTRAMUSCULAR; INTRAVENOUS; SUBCUTANEOUS EVERY 10 MIN PRN
Status: DISCONTINUED | OUTPATIENT
Start: 2023-07-19 | End: 2023-07-19 | Stop reason: HOSPADM

## 2023-07-19 RX ORDER — DOCUSATE SODIUM 100 MG/1
100 CAPSULE, LIQUID FILLED ORAL 2 TIMES DAILY
Status: DISCONTINUED | OUTPATIENT
Start: 2023-07-19 | End: 2023-07-19 | Stop reason: HOSPADM

## 2023-07-19 RX ORDER — ONDANSETRON 2 MG/ML
INJECTION INTRAMUSCULAR; INTRAVENOUS
Status: DISCONTINUED | OUTPATIENT
Start: 2023-07-19 | End: 2023-07-19

## 2023-07-19 RX ORDER — MIDAZOLAM HYDROCHLORIDE 1 MG/ML
INJECTION INTRAMUSCULAR; INTRAVENOUS
Status: DISCONTINUED | OUTPATIENT
Start: 2023-07-19 | End: 2023-07-19

## 2023-07-19 RX ORDER — SODIUM CHLORIDE, SODIUM LACTATE, POTASSIUM CHLORIDE, CALCIUM CHLORIDE 600; 310; 30; 20 MG/100ML; MG/100ML; MG/100ML; MG/100ML
125 INJECTION, SOLUTION INTRAVENOUS CONTINUOUS
Status: DISCONTINUED | OUTPATIENT
Start: 2023-07-19 | End: 2023-07-19 | Stop reason: HOSPADM

## 2023-07-19 RX ORDER — ACETAMINOPHEN 10 MG/ML
1000 INJECTION, SOLUTION INTRAVENOUS ONCE
Status: DISCONTINUED | OUTPATIENT
Start: 2023-07-19 | End: 2023-07-19 | Stop reason: HOSPADM

## 2023-07-19 RX ORDER — HYDROMORPHONE HYDROCHLORIDE 2 MG/ML
0.5 INJECTION, SOLUTION INTRAMUSCULAR; INTRAVENOUS; SUBCUTANEOUS EVERY 5 MIN PRN
Status: DISCONTINUED | OUTPATIENT
Start: 2023-07-19 | End: 2023-07-19 | Stop reason: HOSPADM

## 2023-07-19 RX ORDER — EPINEPHRINE CONVENIENCE KIT 1 MG/ML(1)
KIT INTRAMUSCULAR; SUBCUTANEOUS
Status: DISCONTINUED | OUTPATIENT
Start: 2023-07-19 | End: 2023-07-19 | Stop reason: HOSPADM

## 2023-07-19 RX ORDER — DEXAMETHASONE SODIUM PHOSPHATE 4 MG/ML
INJECTION, SOLUTION INTRA-ARTICULAR; INTRALESIONAL; INTRAMUSCULAR; INTRAVENOUS; SOFT TISSUE
Status: DISCONTINUED | OUTPATIENT
Start: 2023-07-19 | End: 2023-07-19

## 2023-07-19 RX ORDER — ONDANSETRON 2 MG/ML
4 INJECTION INTRAMUSCULAR; INTRAVENOUS DAILY PRN
Status: DISCONTINUED | OUTPATIENT
Start: 2023-07-19 | End: 2023-07-19 | Stop reason: HOSPADM

## 2023-07-19 RX ORDER — CLINDAMYCIN PHOSPHATE 900 MG/50ML
INJECTION, SOLUTION INTRAVENOUS
Status: DISCONTINUED | OUTPATIENT
Start: 2023-07-19 | End: 2023-07-19

## 2023-07-19 RX ORDER — LIDOCAINE HYDROCHLORIDE 20 MG/ML
INJECTION, SOLUTION EPIDURAL; INFILTRATION; INTRACAUDAL; PERINEURAL
Status: DISCONTINUED | OUTPATIENT
Start: 2023-07-19 | End: 2023-07-19

## 2023-07-19 RX ORDER — ROCURONIUM BROMIDE 10 MG/ML
INJECTION, SOLUTION INTRAVENOUS
Status: DISCONTINUED | OUTPATIENT
Start: 2023-07-19 | End: 2023-07-19

## 2023-07-19 RX ORDER — OXYCODONE HYDROCHLORIDE 5 MG/1
10 TABLET ORAL EVERY 4 HOURS PRN
Status: DISCONTINUED | OUTPATIENT
Start: 2023-07-19 | End: 2023-07-19 | Stop reason: HOSPADM

## 2023-07-19 RX ORDER — MUPIROCIN 20 MG/G
OINTMENT TOPICAL
Status: DISCONTINUED | OUTPATIENT
Start: 2023-07-19 | End: 2023-07-19 | Stop reason: HOSPADM

## 2023-07-19 RX ORDER — ONDANSETRON 4 MG/1
4 TABLET, ORALLY DISINTEGRATING ORAL EVERY 8 HOURS PRN
Qty: 30 TABLET | Refills: 0 | Status: SHIPPED | OUTPATIENT
Start: 2023-07-19

## 2023-07-19 RX ORDER — GLYCOPYRROLATE 0.2 MG/ML
INJECTION INTRAMUSCULAR; INTRAVENOUS
Status: DISCONTINUED | OUTPATIENT
Start: 2023-07-19 | End: 2023-07-19

## 2023-07-19 RX ORDER — FENTANYL CITRATE 50 UG/ML
INJECTION, SOLUTION INTRAMUSCULAR; INTRAVENOUS
Status: DISCONTINUED | OUTPATIENT
Start: 2023-07-19 | End: 2023-07-19

## 2023-07-19 RX ORDER — SODIUM CHLORIDE 9 MG/ML
INJECTION, SOLUTION INTRAVENOUS CONTINUOUS
Status: DISCONTINUED | OUTPATIENT
Start: 2023-07-19 | End: 2023-07-19 | Stop reason: HOSPADM

## 2023-07-19 RX ORDER — PROMETHAZINE HYDROCHLORIDE 25 MG/1
25 TABLET ORAL EVERY 6 HOURS PRN
Status: DISCONTINUED | OUTPATIENT
Start: 2023-07-19 | End: 2023-07-19 | Stop reason: HOSPADM

## 2023-07-19 RX ORDER — DIPHENHYDRAMINE HYDROCHLORIDE 50 MG/ML
25 INJECTION INTRAMUSCULAR; INTRAVENOUS EVERY 6 HOURS PRN
Status: DISCONTINUED | OUTPATIENT
Start: 2023-07-19 | End: 2023-07-19 | Stop reason: HOSPADM

## 2023-07-19 RX ORDER — PROPOFOL 10 MG/ML
VIAL (ML) INTRAVENOUS
Status: DISCONTINUED | OUTPATIENT
Start: 2023-07-19 | End: 2023-07-19

## 2023-07-19 RX ORDER — MORPHINE SULFATE 10 MG/ML
4 INJECTION INTRAMUSCULAR; INTRAVENOUS; SUBCUTANEOUS EVERY 5 MIN PRN
Status: DISCONTINUED | OUTPATIENT
Start: 2023-07-19 | End: 2023-07-19 | Stop reason: HOSPADM

## 2023-07-19 RX ORDER — OXYCODONE HYDROCHLORIDE 5 MG/1
5 TABLET ORAL EVERY 4 HOURS PRN
Status: DISCONTINUED | OUTPATIENT
Start: 2023-07-19 | End: 2023-07-19 | Stop reason: HOSPADM

## 2023-07-19 RX ORDER — EPHEDRINE SULFATE 50 MG/ML
INJECTION, SOLUTION INTRAVENOUS
Status: DISCONTINUED | OUTPATIENT
Start: 2023-07-19 | End: 2023-07-19

## 2023-07-19 RX ORDER — OXYCODONE HYDROCHLORIDE 5 MG/1
5 TABLET ORAL
Status: DISCONTINUED | OUTPATIENT
Start: 2023-07-19 | End: 2023-07-19 | Stop reason: HOSPADM

## 2023-07-19 RX ADMIN — SUGAMMADEX 200 MG: 100 INJECTION, SOLUTION INTRAVENOUS at 03:07

## 2023-07-19 RX ADMIN — DEXAMETHASONE SODIUM PHOSPHATE 8 MG: 4 INJECTION, SOLUTION INTRA-ARTICULAR; INTRALESIONAL; INTRAMUSCULAR; INTRAVENOUS; SOFT TISSUE at 12:07

## 2023-07-19 RX ADMIN — ROCURONIUM BROMIDE 50 MG: 10 INJECTION, SOLUTION INTRAVENOUS at 12:07

## 2023-07-19 RX ADMIN — FENTANYL CITRATE 100 MCG: 50 INJECTION INTRAMUSCULAR; INTRAVENOUS at 01:07

## 2023-07-19 RX ADMIN — SODIUM CHLORIDE: 9 INJECTION, SOLUTION INTRAVENOUS at 12:07

## 2023-07-19 RX ADMIN — EPHEDRINE SULFATE 25 MG: 50 INJECTION INTRAVENOUS at 01:07

## 2023-07-19 RX ADMIN — GLYCOPYRROLATE 0.2 MG: 0.2 INJECTION INTRAMUSCULAR; INTRAVENOUS at 01:07

## 2023-07-19 RX ADMIN — ONDANSETRON 8 MG: 2 INJECTION INTRAMUSCULAR; INTRAVENOUS at 12:07

## 2023-07-19 RX ADMIN — LIDOCAINE HYDROCHLORIDE 100 MG: 20 INJECTION, SOLUTION EPIDURAL; INFILTRATION; INTRACAUDAL; PERINEURAL at 12:07

## 2023-07-19 RX ADMIN — BUPIVACAINE HYDROCHLORIDE 15 ML: 5 INJECTION, SOLUTION EPIDURAL; INTRACAUDAL; PERINEURAL at 12:07

## 2023-07-19 RX ADMIN — SODIUM CHLORIDE: 9 INJECTION, SOLUTION INTRAVENOUS at 02:07

## 2023-07-19 RX ADMIN — PROPOFOL 180 MG: 10 INJECTION, EMULSION INTRAVENOUS at 12:07

## 2023-07-19 RX ADMIN — MIDAZOLAM 2 MG: 1 INJECTION INTRAMUSCULAR; INTRAVENOUS at 12:07

## 2023-07-19 RX ADMIN — BUPIVACAINE 10 ML: 13.3 INJECTION, SUSPENSION, LIPOSOMAL INFILTRATION at 12:07

## 2023-07-19 RX ADMIN — ROCURONIUM BROMIDE 20 MG: 10 INJECTION, SOLUTION INTRAVENOUS at 02:07

## 2023-07-19 RX ADMIN — CLINDAMYCIN IN 5 PERCENT DEXTROSE 900 MG: 18 INJECTION, SOLUTION INTRAVENOUS at 12:07

## 2023-07-19 NOTE — ANESTHESIA PROCEDURE NOTES
Intubation    Date/Time: 7/19/2023 12:43 PM  Performed by: Kaz Aguero CRNA  Authorized by: Warren Noble MD     Intubation:     Induction:  Intravenous    Intubated:  Postinduction    Mask Ventilation:  Easy mask    Attempts:  1    Attempted By:  CRNA    Method of Intubation:  Direct    Blade:  July 4    Laryngeal View Grade: Grade I - full view of cords      Difficult Airway Encountered?: No      Complications:  None    Airway Device:  Oral endotracheal tube    Airway Device Size:  7.0    Style/Cuff Inflation:  Cuffed    Inflation Amount (mL):  7    Tube secured:  22    Secured at:  The lips    Placement Verified By:  Capnometry    Complicating Factors:  None    Findings Post-Intubation:  BS equal bilateral and atraumatic/condition of teeth unchanged

## 2023-07-19 NOTE — OP NOTE
Surgery Date: 7/19/2023      Surgeon(s) and Role:     * Jg Driscoll MD - Primary    Assistant: Nickolas Harper    Pre-op Diagnosis:   Tear of rotator cuff, unspecified laterality, unspecified tear extent, unspecified whether traumatic [M75.100]     Post-op Diagnosis:  Post-Op Diagnosis Codes:     * Tear of rotator cuff, unspecified laterality, unspecified tear extent, unspecified whether traumatic [M75.100]   SLAP tear  Bankart lesion  Distal clavicle osteoarthritis  Subacromial impingement      Procedure:  Procedure(s) (LRB):  REPAIR, ROTATOR CUFF, ARTHROSCOPIC (Right)  ARTHROSCOPY, SHOULDER, WITH SUBACROMIAL SPACE DECOMPRESSION (Right)  ARTHROSCOPY,SHOULDER,WITH BICEPS TENODESIS (Right)  ARTHROSCOPY, SHOULDER, WITH DISTAL CLAVICLE EXCISION (Right)   Right shoulder arthroscopic Bankart repair  Right shoulder extensive debridement of the glenohumeral joint  Anesthesia:  General + interscalene block    EBL:  5cc    Implants:   Implant Name Type Inv. Item Serial No.  Lot No. LRB No. Used Action   ANCHOR SUTURE Q FIX 1.8MM - [id number]  ANCHOR SUTURE Q FIX 1.8MM  [employer] [id number] Right 1 Implanted   HEALICOIL REGENSORB 5.5MM SUTURE ANCHOR WITH THREE ULTRABRAID SUTURES    [employer] [id number] Right 1 Implanted   HEALICOIL REGENESORB 5.5MM SUTURE ANCHOR WITH THREE ULTRABRAID SUTURES    [employer] [id number] Right 1 Implanted   HEALICOIL KNOTLESS REGENESORB SUTURE ANCHOR    [employer]  Right 1 Implanted   HEALICOIL KNOTLESS REGENESORB SUTURE ANCHOR    [employer]  Right 1 Implanted   ANCHOR BIOCOMP SWVLLOK - [id number]  ANCHOR BIOCOMP SWVLLOK  [employer] [id number] Right 1 Implanted       Description of findings:  see below    Complication:   none        Procedure: The patient was taken to the operating room and was initially placed supine.  Anesthesia was administered, as was an interscalene block, and pre-operative antibiotics were given.  A timeout was performed. The patient was then placed in the  lateral decubitus position on a bean bag.  All bony prominences were well padded.  An axillary roll was created and appropriately placed.  The arm was then prepped and draped in the usual sterile fashion. The arm was placed into a spider arm lyn.  A standard posterior portal was then undertaken and a diagnostic arthroscopy was performed.   Within the glenohumeral joint was noted to be a delaminated rotator cuff tear involving the supraspinatus and infraspinatus there was also noted to be significant thickening within the rotator interval as well as a SLAP tear that extended anteriorly to about the 4 o'clock position.  The biceps was tenotomized at this time the rotator articular sided footprint was then meticulously debrided and the thickened rotator interval was then addressed by releasing this.  This include a release of the coracohumeral ligament superior glenohumeral ligament and middle glenohumeral ligament in order to completely decompress this very thickened area.  There was chondromalacia seen on the glenoid and humeral head this was debrided with a motorized shaver as well and after completely debriding the undersurface of the cuff we then turned our attention towards the Bankart lesion.  This head as well lesion had chondromalacia on the anterior aspect of the glenoid.  This was proximally at the 4 o'clock to 2 o'clock position.  I elected to put a single 1.8 mm all suture anchor at the 3 o'clock position and then tied this with a simple suture configuration utilizing a modified Bal knot this covered up the area of chondromalacia seen on the anterior glenoid and repaired the labrum in an anatomic position.  This completed the Bankart repair attention was then turned towards the subacromial space    The arthroscope was then inserted into the subacromial space.  A lateral portal was established 2-3 fingerbreadths lateral to the anterolateral edge of the acromion.  The rotator cuff was then visualized.   A 2 cm tear that involved a significant delamination of the supraspinatus and infraspinatus was visualized.  After establishing a portal adjacent to the acromion and a traditional lateral portal and an accessory lateral portal the rotator cuff tear was then addressed by debriding the footprint meticulously then placing a FiberLink anchor within the fairly mobile undersurface delamination.  With this being mobile 2 triple loaded 5.5 mm anchors were placed along the medial margin of the greater tuberosity.  These were then sequentially passed in horizontal mattress suture configurations careful to place the sutures through the tissue delamination.  Once this was complete we also utilized an additional 2 FiberLink anchors and then tied the knots and then saved the knots for incorporation into a lateral row suture anchor.  We elected to use 2 separate lateral row anchors these being 5.5 mm knotless anchors and the sutures were then coupled into this meticulously and this completed the double row repair of the delaminated supraspinatus and infraspinatus tear.    Attention was then turned towards the biceps tendon.  The transverse humeral ligament was identified and released.  The biceps was noted to have synovitis surrounding the biceps tendon.  The biceps was then exteriorized after the bicipital groove had been thoroughly debrided and decorticated.  2 cm of biceps tendon was excised.  A 2. FiberWire suture was used to create a Krackow suture configuration in the biceps tendon.  The biceps tendon and the corresponding suture strand were coupled to a 4.75 mm BioComposite SwiveLock anchor.   hole was created high within the bicipital groove in SwiveLock anchor was introduced and seated appropriately to complete the biceps tenodesis in an onlay suprapectoral fashion        The undersurface of the acromion was inspected.  The acromion was classified as Type 2 .  A posterior cutting block technique was utilized to  perform a subacromial decompression.  The posterior border of the distal clavicle was used a reference point for a complete decompression, as the acromion was converted from a curved type II-III acromion to a flat, type I acromion.     The distal clavicle was then inspected.  The distal clavicle demonstrated marked arthrosis and narrowing.  A distal clavicle excision was then performed, resecting 8-10 mm of distal clavicle with an arthroscopic kamran. The superior and posterior acromioclavicular ligaments were protected. A smooth resection and thorough decompression was noted at final inspection.      This completed the procedure.  Final pictures were taken, and all instruments were removed. Portals were closed with 3-0 monocryl.  Steri-strips were applied, sterile dressings were placed, and the patient was placed into a shoulder abduction pillow sling.           Disposition:awakened from anesthesia, extubated and taken to the recovery room in a stable condition, having suffered no apparent untoward event.

## 2023-07-19 NOTE — PT/OT/SLP EVAL
Physical Therapy Evaluation    Patient Name:  Mary Johansen   MRN:  [id number]    Recommendations:     Discharge Recommendations: outpatient PT   Discharge Equipment Recommendations: none   Barriers to discharge: None    Assessment:     Mary Johansen is a 52 y.o. female admitted with a medical diagnosis of Tear of right rotator cuff.  She presents with the following impairments/functional limitations: decreased ROM Patient with good understanding of post op education.    Rehab Prognosis: Good; patient would benefit from acute skilled PT services to address these deficits and reach maximum level of function.    Recent Surgery: Procedure(s) (LRB):  REPAIR, ROTATOR CUFF, ARTHROSCOPIC (Right)  ARTHROSCOPY, SHOULDER, WITH SUBACROMIAL SPACE DECOMPRESSION (Right)  ARTHROSCOPY,SHOULDER,WITH BICEPS TENODESIS (Right)  ARTHROSCOPY, SHOULDER, WITH DISTAL CLAVICLE EXCISION (Right) Day of Surgery    Plan:     During this hospitalization, patient to be seen 1 x/week to address the identified rehab impairments via therapeutic activities and progress toward the following goals:    Plan of Care Expires:  07/19/23    Subjective     Chief Complaint: block  Patient/Family Comments/goals: dc home today  Pain/Comfort:  Pain Rating 1: 0/10    Patients cultural, spiritual, Sikhism conflicts given the current situation:      Living Environment:  Lives with family  Prior to admission, patients level of function was independent.  Equipment used at home: none.  DME owned (not currently used): rolling walker.  Upon discharge, patient will have assistance from family.    Objective:     Communicated with independent prior to session.  Patient found supine with    upon PT entry to room.    General Precautions: Standard,    Orthopedic Precautions:    Braces: Shoulder abduction brace  Respiratory Status: Room air    Exams:  na    Functional Mobility:  Bed Mobility:     Scooting: independence      AM-PAC 6 CLICK MOBILITY  Total  Score:18       Treatment & Education:   HEP: medium cuff protocol, don/doff abd pillow    Patient left supine with all lines intact.    GOALS:   Multidisciplinary Problems       Physical Therapy Goals          Problem: Physical Therapy    Goal Priority Disciplines Outcome Goal Variances Interventions   Physical Therapy Goal     PT, PT/OT                          History:     Past Medical History:   Diagnosis Date    Hypertension        Past Surgical History:   Procedure Laterality Date    HYSTERECTOMY  2005    TONSILLECTOMY         Time Tracking:     PT Received On: 07/19/23  PT Start Time: 1640     PT Stop Time: 1650  PT Total Time (min): 10 min     Billable Minutes: Evaluation 10      07/19/2023

## 2023-07-19 NOTE — TRANSFER OF CARE
"Anesthesia Transfer of Care Note    Patient: Mary Johansen    Procedure(s) Performed: Procedure(s) (LRB):  REPAIR, ROTATOR CUFF, ARTHROSCOPIC (Right)  ARTHROSCOPY, SHOULDER, WITH SUBACROMIAL SPACE DECOMPRESSION (Right)  ARTHROSCOPY,SHOULDER,WITH BICEPS TENODESIS (Right)  ARTHROSCOPY, SHOULDER, WITH DISTAL CLAVICLE EXCISION (Right)    Patient location: PACU    Anesthesia Type: general    Transport from OR: Transported from OR on 6-10 L/min O2 by face mask with adequate spontaneous ventilation    Post pain: adequate analgesia    Post assessment: no apparent anesthetic complications    Post vital signs: stable    Level of consciousness: responds to stimulation and awake    Nausea/Vomiting: no nausea/vomiting    Complications: none    Transfer of care protocol was followedComments: Good SV continue, NAD noted, VSS, RTRN      Last vitals:   Visit Vitals  /85   Pulse 90   Temp 36.6 °C (97.9 °F)   Resp (!) 27   Ht 5' 10.5" (1.791 m)   Wt 104.8 kg (231 lb)   SpO2 99%   Breastfeeding No   BMI 32.68 kg/m²     "

## 2023-07-19 NOTE — OR NURSING
1556 REC'D PT TO PACU IN STABLE CONDITION. NO NEEDS OR CONCERNS MADE KNOWN. R SHOULDER DSG C/D/I W/ SLING TO R ARM NOTED. WILL CONT TO MONITOR.     1610 UPDATED  ON PT STATUS VIA TEXT MESSAGE.     1635 RETURNED PT TO [facility] IN STABLE CONDITION. /93 HR 86 RR 20 SATS 94% ON RA.

## 2023-07-19 NOTE — H&P
H&P completed  has been reviewed, the patient has been examined and:  I concur with the findings and no changes have occurred since H&P was written.    There are no hospital problems to display for this patient.        Jose Johansen returns to clinic for a follow up visit regarding       ICD-10-CM ICD-9-CM   1. Tear of rotator cuff, unspecified laterality, unspecified tear extent, unspecified whether traumatic  M75.100 840.4         Pt is here for MRI f/u and treatment moving forward.        PAST MEDICAL HISTORY:        Past Medical History:   Diagnosis Date    Hypertension        PAST SURGICAL HISTORY:         Past Surgical History:   Procedure Laterality Date    HYSTERECTOMY   2005    TONSILLECTOMY                PHYSICAL EXAMINATION:  General     Nursing note and vitals reviewed.  Constitutional: She is oriented to person, place, and time. She appears well-developed and well-nourished. No distress.   HENT:   Head: Normocephalic and atraumatic.   Eyes: Pupils are equal, round, and reactive to light.   Neck: Neck supple.   Cardiovascular:  Normal rate and intact distal pulses.            Pulmonary/Chest: Breath sounds normal. No respiratory distress. She exhibits no tenderness.   Abdominal: Soft. Bowel sounds are normal.   Neurological: She is alert and oriented to person, place, and time. She has normal reflexes. She displays normal reflexes. No cranial nerve deficit. She exhibits normal muscle tone.   Psychiatric: She has a normal mood and affect. Her behavior is normal. Judgment and thought content normal.            Right Shoulder Exam      Inspection/Observation   Swelling: absent  Bruising: absent  Scapular Dyskinesia: positive     Tenderness   The patient is tender to palpation of the greater tuberosity, acromion and acromioclavicular joint.     Range of Motion   Active abduction:  abnormal   Passive abduction:  abnormal   Forward Flexion:  abnormal   Forward Elevation: abnormal  External Rotation 90 degrees:  normal     Tests & Signs   Apprehension: negative  Cross arm: positive  Drop arm: negative  Guerrero test: positive  Impingement: positive  Rotator Cuff Painful Arc/Range: mild  Lag Sign 90 degrees: negative  Lift Off Sign: positive  Belly Press: positive  Active Compression Test (Ravenna's Sign): positive  Jerk Test: negative     Other   Sensation: normal     Comments:  Pain with dynamic labral shear test.  There is pain and weakness with Whipple testing.      Left Shoulder Exam   Left shoulder exam is normal.         Muscle Strength   Right Upper Extremity   Shoulder External Rotation: 4/5   Supraspinatus: 4/5   Subscapularis: 4/5   Biceps: 4/5      Reflexes      Right Side   Biceps:  2+  Right Asencio's Sign:  absent     Vascular Exam      Right Pulses        Radial:                    2+        Capillary Refill  Right Hand: normal capillary refill              IMAGING:  I reviewed her MRI of her right shoulder which confirms the presence of a full-thickness tear of the supraspinatus tendon.  There was a large anteriorly based osteophyte complex along the AC joint.  There is also irregularity and flattening seen of the biceps tendon  ASSESSMENT:        ICD-10-CM ICD-9-CM   1. Tear of rotator cuff, unspecified laterality, unspecified tear extent, unspecified whether traumatic  M75.100 840.4         PLAN:     -Findings and treatment options were discussed with the patient  -All questions answered  Natural history and expected course discussed. Questions answered.  Educational material distributed.  Reduction in offending activity.     MRI findings were reviewed with the patient.  The patient has a symptomatic full-thickness rotator cuff tear with associated impingement findings.  Muscle quality is well maintained.  Treatment options were reviewed to include both operative and nonoperative measures.  The patient has failed an initial course of conservative treatment.  Given the extent and duration of the patient's  complaints, operative intervention is certainly reasonable at this point.  The details of arthroscopic rotator cuff repair with subacromial decompression, biceps tenotomy versus tenodesis, labral debridement, distal clavicle excision, and possible Regeneten patch augmentation were discussed.     The patient understands the likely convalescence after surgery, in particular the expected postop rehab and recovery course. Alternatives both operative and non-operative with associated risks and benefits discussed. The outlined risks and potential complications of the proposed procedure include but are not limited to: infection, poor wound healing, scarring, stiffness, swelling, continued or recurrent pain, instability, hardware or prosthetic failure, symptomatic hardware requiring removal, weakness, neurovascular injury, numbness, chronic regional pain disorder, tissue nonhealing/irreparability/retear, contralateral ligament injury, chondral injury, arthritis, fracture, blood clot formation, inability to return to previous level of activity, anesthetic or regional block complication up to death, need for additional procedure as indicated, and potential need for further surgery.      she was also informed and understands the risks of surgery are greater for patients with a current condition or history of heart disease, obesity, clotting disorders, recurrent infections, steroid use, current or past smoking, and factors such as sedentary lifestyle and noncompliance with medications, therapy or follow-up. The degree of the increased risk is hard to estimate with any degree of precision.     All questions were answered. The patient has verbalized understanding of these issues and wishes to proceed as discussed. The patient understands that recovery time can be up to 6-12 months.

## 2023-07-19 NOTE — ANESTHESIA PREPROCEDURE EVALUATION
07/19/2023  Mary Johansen is a 52 y.o., female.      Pre-op Assessment    I have reviewed the Patient Summary Reports.     I have reviewed the Nursing Notes. I have reviewed the NPO Status.   I have reviewed the Medications.     Review of Systems  Anesthesia Hx:  No problems with previous Anesthesia    Social:  Non-Smoker, No Alcohol Use    Hematology/Oncology:  Hematology Normal   Oncology Normal     EENT/Dental:EENT/Dental Normal   Cardiovascular:   Hypertension    Pulmonary:  Pulmonary Normal    Renal/:  Renal/ Normal     Hepatic/GI:  Hepatic/GI Normal    Musculoskeletal:  Musculoskeletal Normal    Neurological:  Neurology Normal    Endocrine:  Endocrine Normal  Obesity / BMI > 30  Dermatological:  Skin Normal    Psych:  Psychiatric Normal           Physical Exam  General: Well nourished    Airway:  Mallampati: II / II  Mouth Opening: Normal  TM Distance: > 6 cm  Tongue: Normal  Neck ROM: Normal ROM    Chest/Lungs:  Clear to auscultation, Normal Respiratory Rate    Heart:  Rate: Normal  Rhythm: Regular Rhythm        Anesthesia Plan  Type of Anesthesia, risks & benefits discussed:    Anesthesia Type: Gen ETT, Regional  Intra-op Monitoring Plan: Standard ASA Monitors  Post Op Pain Control Plan: multimodal analgesia  Induction:  IV  Informed Consent: Informed consent signed with the Patient and all parties understand the risks and agree with anesthesia plan.  All questions answered.   ASA Score: 2  Day of Surgery Review of History & Physical: H&P Update referred to the surgeon/provider.I have interviewed and examined the patient. I have reviewed the patient's H&P dated: There are no significant changes. H&P completed by Anesthesiologist.    Ready For Surgery From Anesthesia Perspective.     .

## 2023-07-19 NOTE — PLAN OF CARE
Medium Rotator Cuff Repair (1-3cm)                                                                                             Post-operative Protocol    Ultra sling for weeks 0-3  Regular sling for weeks 3-6      If biceps tenodesis is also performed, then all ROM for weeks 0-6 performed w/ elbow flexed      Phase 0   Pendulums to warm-up beginning week 1      Scapular stabilizations        Phase 1 - (PASSIVE)    Week 2-6  Supine External Rotation -0°-30° beginning at 2 weeks with progression to full PROM by 6 weeks    Supine Forward Elevation -0°-90° beginning at 2 weeks with progression to full PROM by 6 weeks    *progress to upright as tolerated with ER and FE      Phase 2 - (ACTIVE)  Pendulums to warm-up.  Week 7-9    Active Range of Motion with terminal stretch    Supine External Rotation - after 6 weeks; progress GRADUALLY to full   Supine Forward Elevation - after 6 weeks; progress GRADUALLY to full    Begin active biceps   Internal Rotation - Full (begin behind the back)   Begin AROM in supine and progress to upright    Phase 3 - (RESISTED)  Pendulums to warm up and continue with phase 2.   Week 10 External and Internal Rotation    Standing forward punch     Seated rows    Shoulder shrugs and Biceps curls       Weight Training  Keep hands within eyesight, keep elbows bent, no long lever arms.  Week 12    Minimize overhead activities (below shoulder)      (No  press, pull-down behind head, or wide  bench)      Initiation of Interval Sport Programs    Golf    3 months  Tennis   4 months  Ski   3-4 months                                                         Return to Activity   Rotator Cuff Repair      Slin weeks post-operative  Showering: After 3 days, no soaking    Passive Range of Motion: Post-op day 1 until 6 weeks    Active Range of Motion: Weeks 6 to 10    Resistive exercise program: Approximately week 10     Running: Week 10    Gym Program:    Pulling  motions, biceps, and triceps exercises week 16, using light weights and high repetitions  Pressing motions at 16 to 20 weeks as tolerated    Outdoor Bikin-14 weeks on road bike           Interval Sports Programs: Week 16 to 20  Throwing  Swimming  Golf  Tennis    Return to Competition: Week 20-24, based on physician clinical examination and >90% strength testing

## 2023-07-24 ENCOUNTER — TELEPHONE (OUTPATIENT)
Dept: ORTHOPEDICS | Facility: CLINIC | Age: 53
End: 2023-07-24
Payer: COMMERCIAL

## 2023-07-24 DIAGNOSIS — Z98.890 S/P ARTHROSCOPY OF RIGHT SHOULDER: Primary | ICD-10-CM

## 2023-07-24 NOTE — DISCHARGE SUMMARY
[facility] - Orthopedic Periop Services  Discharge Note  Short Stay    Procedure(s) (LRB):  REPAIR, ROTATOR CUFF, ARTHROSCOPIC (Right)  ARTHROSCOPY, SHOULDER, WITH SUBACROMIAL SPACE DECOMPRESSION (Right)  ARTHROSCOPY,SHOULDER,WITH BICEPS TENODESIS (Right)  ARTHROSCOPY, SHOULDER, WITH DISTAL CLAVICLE EXCISION (Right)      OUTCOME: Patient tolerated treatment/procedure well without complication and is now ready for discharge.    DISPOSITION: Home or Self Care    FINAL DIAGNOSIS:  Tear of right rotator cuff    FOLLOWUP: In clinic    DISCHARGE INSTRUCTIONS:    Discharge Procedure Orders   SLING ORTHOPEDIC MEDIUM FOR HOME USE   Order Comments: Abduction pillow sling for small, medium, large, massive rotator cuff repairs; Abduction pillow sling for proximal humerus ORIF, reverse shoulder arthroplasty, total shoulder arthroplasty.  Regular sling for clavicle ORIF, shoulder debridements.     Diet general     Remove dressing in 72 hours     Call MD for:  temperature >100.4     Call MD for:  persistent nausea and vomiting     Call MD for:  severe uncontrolled pain     Call MD for:  difficulty breathing, headache or visual disturbances     Call MD for:  redness, tenderness, or signs of infection (pain, swelling, redness, odor or green/yellow discharge around incision site)     Call MD for:  hives     Call MD for:  persistent dizziness or light-headedness     Call MD for:  extreme fatigue     Non weight bearing         Clinical Reference Documents Added to Patient Instructions         Document    [facility] AVS FACTSHEET    SHOULDER ARTHROSCOPY DISCHARGE INSTRUCTIONS (ENGLISH)            TIME SPENT ON DISCHARGE: 9 minutes

## 2023-07-25 PROCEDURE — 64415 PERIPHERAL BLOCK: ICD-10-PCS | Mod: 59,RT,, | Performed by: ANESTHESIOLOGY

## 2023-07-25 PROCEDURE — 64415 NJX AA&/STRD BRCH PLXS IMG: CPT | Mod: 59,RT,, | Performed by: ANESTHESIOLOGY

## 2023-07-25 RX ORDER — BUPIVACAINE HYDROCHLORIDE 5 MG/ML
INJECTION, SOLUTION EPIDURAL; INTRACAUDAL
Status: DISCONTINUED | OUTPATIENT
Start: 2023-07-19 | End: 2023-07-25

## 2023-07-25 NOTE — ANESTHESIA PROCEDURE NOTES
Peripheral Block    Patient location during procedure: OR   Block not for primary anesthetic.  Reason for block: at surgeon's request and post-op pain management   Post-op Pain Location: right shoulder   Start time: 7/19/2023 12:15 PM  Timeout: 7/19/2023 12:15 PM   End time: 7/19/2023 12:15 PM    Staffing  Authorizing Provider: Warren Noble MD  Performing Provider: Warren Noble MD    Preanesthetic Checklist  Completed: patient identified, IV checked, site marked, risks and benefits discussed, surgical consent, monitors and equipment checked, pre-op evaluation and timeout performed  Peripheral Block  Patient position: sitting  Prep: ChloraPrep  Patient monitoring: heart rate, continuous pulse ox, continuous capnometry, frequent blood pressure checks and cardiac monitor  Block type: interscalene  Laterality: right  Injection technique: single shot  Needle  Needle type: Stimuplex   Needle gauge: 20 G  Needle length: 2 in  Needle localization: nerve stimulator and ultrasound guidance   -ultrasound image captured on disc.  Assessment  Injection assessment: negative aspiration, negative parasthesia and local visualized surrounding nerve  Paresthesia pain: none  Heart rate change: no  Slow fractionated injection: yes  Pain Tolerance: comfortable throughout block  Medications:    Medications: BUPivacaine (pf) (MARCAINE) injection 0.5% - Perineural   15 mL - 7/19/2023 12:15:00 PM

## 2023-07-25 NOTE — ANESTHESIA POSTPROCEDURE EVALUATION
Anesthesia Post Evaluation    Patient: Mary Johansen    Procedure(s) Performed: Procedure(s) (LRB):  REPAIR, ROTATOR CUFF, ARTHROSCOPIC (Right)  ARTHROSCOPY, SHOULDER, WITH SUBACROMIAL SPACE DECOMPRESSION (Right)  ARTHROSCOPY,SHOULDER,WITH BICEPS TENODESIS (Right)  ARTHROSCOPY, SHOULDER, WITH DISTAL CLAVICLE EXCISION (Right)    Final Anesthesia Type: general      Patient location during evaluation: PACU  Patient participation: Yes- Able to Participate  Level of consciousness: awake and alert  Post-procedure vital signs: reviewed and stable  Pain management: adequate  Airway patency: patent  ROSALIO mitigation strategies: Multimodal analgesia and Use of major conduction anesthesia (spinal/epidural) or peripheral nerve block  PONV status at discharge: No PONV  Anesthetic complications: no      Cardiovascular status: blood pressure returned to baseline  Respiratory status: unassisted  Hydration status: euvolemic  Follow-up not needed.          Vitals Value Taken Time   /95 07/19/23 1646   Temp 36.6 °C (97.9 °F) 07/19/23 1600   Pulse 80 07/19/23 1652   Resp 20 07/19/23 1645   SpO2 96 % 07/19/23 1652   Vitals shown include unvalidated device data.      Event Time   Out of Recovery 16:26:00         Pain/Candelario Score: No data recorded

## 2023-07-26 ENCOUNTER — OFFICE VISIT (OUTPATIENT)
Dept: ORTHOPEDICS | Facility: CLINIC | Age: 53
End: 2023-07-26
Payer: COMMERCIAL

## 2023-07-26 ENCOUNTER — CLINICAL SUPPORT (OUTPATIENT)
Dept: REHABILITATION | Facility: HOSPITAL | Age: 53
End: 2023-07-26
Payer: COMMERCIAL

## 2023-07-26 DIAGNOSIS — R52 PAIN: ICD-10-CM

## 2023-07-26 DIAGNOSIS — Z98.890 S/P ARTHROSCOPY OF RIGHT SHOULDER: ICD-10-CM

## 2023-07-26 DIAGNOSIS — Z98.890 S/P ARTHROSCOPY OF RIGHT SHOULDER: Primary | ICD-10-CM

## 2023-07-26 PROCEDURE — 97110 THERAPEUTIC EXERCISES: CPT

## 2023-07-26 PROCEDURE — 4010F PR ACE/ARB THEARPY RXD/TAKEN: ICD-10-PCS | Mod: ,,, | Performed by: NURSE PRACTITIONER

## 2023-07-26 PROCEDURE — 97014 ELECTRIC STIMULATION THERAPY: CPT

## 2023-07-26 PROCEDURE — 99024 POSTOP FOLLOW-UP VISIT: CPT | Mod: ,,, | Performed by: NURSE PRACTITIONER

## 2023-07-26 PROCEDURE — 4010F ACE/ARB THERAPY RXD/TAKEN: CPT | Mod: ,,, | Performed by: NURSE PRACTITIONER

## 2023-07-26 PROCEDURE — 99024 PR POST-OP FOLLOW-UP VISIT: ICD-10-PCS | Mod: ,,, | Performed by: NURSE PRACTITIONER

## 2023-07-26 PROCEDURE — 1159F MED LIST DOCD IN RCRD: CPT | Mod: ,,, | Performed by: NURSE PRACTITIONER

## 2023-07-26 PROCEDURE — 1159F PR MEDICATION LIST DOCUMENTED IN MEDICAL RECORD: ICD-10-PCS | Mod: ,,, | Performed by: NURSE PRACTITIONER

## 2023-07-26 PROCEDURE — 99213 OFFICE O/P EST LOW 20 MIN: CPT | Mod: PBBFAC | Performed by: NURSE PRACTITIONER

## 2023-07-26 PROCEDURE — 97161 PT EVAL LOW COMPLEX 20 MIN: CPT

## 2023-07-26 RX ORDER — HYDROCODONE BITARTRATE AND ACETAMINOPHEN 10; 325 MG/1; MG/1
1 TABLET ORAL EVERY 6 HOURS PRN
Qty: 30 TABLET | Refills: 0 | Status: SHIPPED | OUTPATIENT
Start: 2023-07-26

## 2023-07-26 NOTE — PROGRESS NOTES
No surgery found No surgery found      Pt is here today for First post-operative followup of her shoulder arthroscopy.      she is doing well.  She will be starting physical therapy today at [facility].  Patient is 1 week postop right knee arthroscopy, doing well.  Incisions look good today.  Steri-Strips intact.  She is wearing her ultra arm sling.  Reports she was unable to take Percocet due to itching.  We will try her on Norco today.  We reviewed her plan of care in detail.  She starts PT today.  We have reviewed her findings and discussed plan of care and future treatment options, including the physical therapy plan.                                                                                     PHYSICAL EXAMINATION:     Incision sites healed well. Sling is in appropriate position  No evidence of any erythema, infction or induration  Range of motion of the shoulder: passive FF 0-60, ER 0-15  Biceps tone appears appropriate      IMAGING: no new imaging    No image results found.                                                                                   ASSESSMENT:                                                                                                                                               1. Status post shoulder arthroscopy, doing well.                                                                                                                               PLAN:                                                                                                                                                     1. Continue with PT  2. Emphasized scapular stabilization to improve overall function.  3. I have discussed return to activity in detail.  4. she will see us back in 4 weeks.                                      5. All questions were answered and she should contact us if she  has any questions or concerns in the interim.            Start outpatient physical therapy.  [place]  as needed for pain.  Return to clinic 4 weeks with Dr. Driscoll.  Continue arm sling.  There are no Patient Instructions on file for this visit.

## 2023-07-26 NOTE — PLAN OF CARE
"[facility]   Physical Therapy Initial Evaluation      Name: Mary Johansen  Clinic Number: [id number]    Therapy Diagnosis:   Encounter Diagnosis   Name Primary?    S/P arthroscopy of right shoulder         Physician: Jg Driscoll MD    Physician Orders: PT Eval and Treat   Medical Diagnosis from Referral: s/p arthroscopy of right shoulder  Evaluation Date: 7/26/2023  Authorization Period Expiration: 7/23/2024  Plan of Care Expiration: 9/8/2023  Progress Note Due: 9/8/2023  Visit # / Visits authorized: 1/12     Precautions: Standard     Time In: 14:41  Time Out: 15:28  Total Billable Time: 47 minutes    Subjective     Date of onset: 7/19/2023    History of current condition - Mary reports: Patient s/p R rotator cuff repair with decompression and biceps tendonesis on 7/19/2023. Patient had follow-up today with good report. 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 Patient returns to MD in 4 weeks. Patient wearing abduction sling. Patient referred for shoulder rehab.     Falls: 0    Imaging: none post op     Prior Therapy: prior to surgery   Social History:  lives with their spouse  Occupation: owns business works as [employer]   Prior Level of Function: semi-active  Current Level of Function: sedentary    Pain:  Current 5/10, worst 7/10, best 3/10   Location: right shoulder    Description: Aching, Dull, and Sharp  Aggravating Factors: movement  Easing Factors: pain medication, ice, and rest    Patients goals: "to be 100%"      Medical History:   Past Medical History:   Diagnosis Date    Hypertension        Surgical History:   Mary Johansen  has a past surgical history that includes Hysterectomy (2005); Tonsillectomy; Arthroscopic repair of rotator cuff of shoulder (Right, 7/19/2023); Arthroscopy of shoulder with decompression of subacromial space (Right, 7/19/2023); arthroscopy,shoulder,with biceps tenodesis (Right, 7/19/2023); and Arthroscopy of " shoulder with removal of distal clavicle (Right, 7/19/2023).    Medications:   Mary has a current medication list which includes the following prescription(s): cefuroxime, hydrocodone-acetaminophen, lisinopril-hydrochlorothiazide, naproxen, ondansetron, and trazodone.    Allergies:   Review of patient's allergies indicates:   Allergen Reactions    Sulfa (sulfonamide antibiotics) Rash    Pcn [penicillins] Rash        Objective      Incisions: 6 portal hole incisions with good healing steri-strips intact     Posture: rounded shoulders Yes, forward head Yes, increased kyphotic curve Yes, decreased lordotic curve No  Clear cervical spine: Spurling's test negative    Range of motion  Motion Right  Left    Shoulder flexion 57 WITHIN FUNCTIONAL LIMITS   Shoulder abduction 35 WITHIN FUNCTIONAL LIMITS   Shoulder internal rotation 40 WITHIN FUNCTIONAL LIMITS   Shoulder external rotation 7 WITHIN FUNCTIONAL LIMITS   Elbow flexion WITHIN FUNCTIONAL LIMITS WITHIN FUNCTIONAL LIMITS   Elbow extension WITHIN FUNCTIONAL LIMITS WITHIN FUNCTIONAL LIMITS   Wrist flexion WITHIN FUNCTIONAL LIMITS WITHIN FUNCTIONAL LIMITS   Wrist extension WITHIN FUNCTIONAL LIMITS WITHIN FUNCTIONAL LIMITS   Ulnar deviation WITHIN FUNCTIONAL LIMITS WITHIN FUNCTIONAL LIMITS   Radial deviation WITHIN FUNCTIONAL LIMITS WITHIN FUNCTIONAL LIMITS       MANUAL MUSCLE TEST  R UE: NT due to post op    L UE: 5/5      Functional ability:  Dressing: AMB PT LEVEL OF ASSISTANCE: min A  Driving: AMB PT LEVEL OF ASSISTANCE: not released to drive   Overhead activity: AMB PT LEVEL OF ASSISTANCE: unable   Work/hobbies: AMB PT LEVEL OF ASSISTANCE: unable with R UE       Clinical test:  N/A post op       Palpation: post op, general swelling      Treatment     Total Treatment time (time-based codes) separate from Evaluation: 30 minutes     Mary received the treatments listed below:      therapeutic exercises to develop strength, endurance, ROM, flexibility, posture,  "and core stabilization for 20 minutes including:    Ther-Ex    Pulleys    Ball Rolls    Finger Ladder    Scap retractions 20 x 3"   Upper Trap Stretch    Levator Scap Stretch        Wrist flexion/extension 20 x    Radial/ulnar deviation 20 x    Pronation/supination 20 x        Cane flexion stretch    Cane ER stretch    Sleeper stretch        Ther-Act    I,Y,T's    Prone Rows    Prone Abduction    Prone Extension            PROM   Flexion: 57 degrees  Abduction: 35 degrees  ER: 7 degrees  IR: 40 degrees     supervised modalities after being cleared for contradictions: IFC Electrical Stimulation:  Mary received IFC Electrical Stimulation for pain control applied to the R shoulder. Pt received stimulation for 10 minutes. Mary tolderated treatment well without any adverse effects.      cold pack for 10 minutes with estim.    Patient Education and Home Exercises     Education provided:   - eval findings, Plan of Care, Home exercise program     Written Home Exercises Provided: yes. Exercises were reviewed and Mary was able to demonstrate them prior to the end of the session.  Mary demonstrated good  understanding of the education provided. See EMR under Patient Instructions for exercises provided during therapy sessions.    Assessment     Mary is a 52 y.o. female referred to outpatient Physical Therapy with a medical diagnosis of R rotator cuff repair. Patient presents with increased pain, decreased strength, and decreased range of motion that limits functional mobility and ADL's. 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 Patient limited in exercises per MD protocol. Patient able to complete exercises with mod cuing to decrease speed of movement and improve range of motion. Patient with max guarding with Passive range of motion due to pain. Modalities used to decrease pain post treatment and Home exercise program reviewed. No adverse effects noted or " reported.     Patient prognosis is Good.   Patient will benefit from skilled outpatient Physical Therapy to address the deficits stated above and in the chart below, provide patient /family education, and to maximize patientt's level of independence.     Plan of care discussed with patient: Yes  Patient's spiritual, cultural and educational needs considered and patient is agreeable to the plan of care and goals as stated below:     Anticipated Barriers for therapy: work tasks, compliance with Home exercise program     Medical Necessity is demonstrated by the following  History  Co-morbidities and personal factors that may impact the plan of care [x] LOW: no personal factors / co-morbidities  [] MODERATE: 1-2 personal factors / co-morbidities  [] HIGH: 3+ personal factors / co-morbidities    Moderate / High Support Documentation:   Co-morbidities affecting plan of care: N/A    Personal Factors:   age     Examination  Body Structures and Functions, activity limitations and participation restrictions that may impact the plan of care [x] LOW: addressing 1-2 elements  [] MODERATE: 3+ elements  [] HIGH: 4+ elements (please support below)    Moderate / High Support Documentation: N/A     Clinical Presentation [x] LOW: stable  [] MODERATE: Evolving  [] HIGH: Unstable     Decision Making/ Complexity Score: low       Goals:  Short Term Goals: 3 weeks   Pt will increase R shoulder flexion to 120 degrees, external rotation to C4, and internal rotation to L2 for independent dressing  Pt will increase R upper extremity to 3/5 to allow patient to perform activities of daily living independently  Pt will report decrease in pain to 5/10 to improve quality of life    Long Term Goals: 6 weeks   Pt will increase R shoulder flexion to 155 degrees, external rotation to C4, and internal rotation to L2 for independent dressing  Pt will increase R upper extremity to 4/5 to allow patient to perform activities of daily living  independently  Pt will report decrease in pain to 3/10 to improve quality of life  Pt will place 5 pound object in overhead shelf without hike and with less than or equal to 4/10 pain to allow patient to return to prior level of function    Plan     Plan of care Certification: 7/26/2023 to 9/8/2023.    Outpatient Physical Therapy 2 times weekly for 6 weeks to include the following interventions: Electrical Stimulation IFC/Biphasic, Manual Therapy, Moist Heat/ Ice, Neuromuscular Re-ed, Patient Education, Self Care, Therapeutic Activities, Therapeutic Exercise, and Ultrasound.     Analy Camargo, PT, DPT 7/26/2023        Physician's Signature: _________________________________________ Date: ________________

## 2023-07-27 PROBLEM — G89.29 CHRONIC RIGHT SHOULDER PAIN: Status: RESOLVED | Noted: 2023-03-08 | Resolved: 2023-07-27

## 2023-07-27 PROBLEM — M25.511 CHRONIC RIGHT SHOULDER PAIN: Status: RESOLVED | Noted: 2023-03-08 | Resolved: 2023-07-27

## 2023-07-27 PROBLEM — R52 PAIN: Status: ACTIVE | Noted: 2023-07-27

## 2023-07-27 NOTE — PROGRESS NOTES
"[facility]   Physical Therapy Treatment Note    Name: Mary Johansen  Clinic Number: [id number]     Therapy Diagnosis:        Encounter Diagnosis   Name Primary?    S/P arthroscopy of right shoulder          Physician: Jg Driscoll MD    Visit Date: 7/28/2023     Physician Orders: PT Eval and Treat   Medical Diagnosis from Referral: s/p arthroscopy of right shoulder  Evaluation Date: 7/26/2023  Authorization Period Expiration: 7/23/2024  Plan of Care Expiration: 9/8/2023  Progress Note Due: 9/8/2023  Visit # / Visits authorized: 2/12      Precautions: Standard      Time In: 13:57 (later check in time)   Time Out: 14:40   Total Billable Time:  43 minutes    PTA Visit #: 0/5   Subjective   Pt reports: "I took some medicine before I came this time."  She was compliant with home exercise program.  Response to previous treatment: soreness  Functional change: too early in plan of care     Pain: 3/10  Location: right shoulder      Objective      Objective Measures updated at progress report unless specified.     Treatment   Mary received the treatments listed below:       therapeutic exercises to develop strength, endurance, ROM, flexibility, posture, and core stabilization including:     Ther-Ex  Reps   Pulleys     Ball Rolls     Finger Ladder     Scap retractions 20 x 3"   Upper Trap Stretch  2 x 30" R side only    Levator Scap Stretch           Wrist flexion/extension 20 x    Radial/ulnar deviation 20 x    Pronation/supination 20 x          Cane flexion stretch     Cane ER stretch     Sleeper stretch           Ther-Act     I,Y,T's     Prone Rows     Prone Abduction     Prone Extension                 Passive range of motion: ( NOT MEASURED TODAY) Flexion: 57 degrees  Abduction: 35 degrees  ER: 7 degrees  IR: 40 degrees      supervised modalities after being cleared for contradictions: IFC Electrical Stimulation:  Mary received IFC Electrical Stimulation for pain control " applied to the R shoulder. Pt received stimulation for 10 minutes. Mary tolderated treatment well without any adverse effects.       cold pack for 10 minutes with estim.  Patient Education and Home Exercises     Written Home Exercises Provided: Patient instructed to cont prior HEP. Exercises were reviewed and Mary was able to demonstrate them prior to the end of the session.  Mary demonstrated good  understanding of the education provided. See EMR under Patient Instructions for exercises provided during therapy sessions    Assessment   Mary is a 52 y.o. female referred to outpatient Physical Therapy with a medical diagnosis of R rotator cuff repair. Patient presents with increased pain, decreased strength, and decreased range of motion that limits functional mobility and ADL's. 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 Patient limited in exercises per MD protocol. Patient required minimal cueing for progressing through wrist ROM Exercise without compensations. PT added upper trapezius stretch for R side due to patient with increased tissue tightness and shoulder elevation. PT provided cues to decrease elevated R shoulder positioning during treatment. Patient required maximal verbal and tactile cues for decreased guarding during Passive range of motion and was still unable to achieve full relaxation limiting ROM. No adverse effects noted to treatment.     Mary Is progressing well towards her goals.   Pt prognosis is Good.     Pt will continue to benefit from skilled outpatient physical therapy to address the deficits listed in the problem list box on initial evaluation, provide pt/family education and to maximize pt's level of independence in the home and community environment.     Pt's spiritual, cultural and educational needs considered and pt agreeable to plan of care and goals.     Anticipated barriers to physical therapy:  work tasks, compliance  with Home exercise program     Goals:  Short Term Goals: 3 weeks   Pt will increase R shoulder flexion to 120 degrees, external rotation to C4, and internal rotation to L2 for independent dressing  Pt will increase R upper extremity to 3/5 to allow patient to perform activities of daily living independently  Pt will report decrease in pain to 5/10 to improve quality of life     Long Term Goals: 6 weeks   Pt will increase R shoulder flexion to 155 degrees, external rotation to C4, and internal rotation to L2 for independent dressing  Pt will increase R upper extremity to 4/5 to allow patient to perform activities of daily living independently  Pt will report decrease in pain to 3/10 to improve quality of life  Pt will place 5 pound object in overhead shelf without hike and with less than or equal to 4/10 pain to allow patient to return to prior level of function    Plan      Plan of care Certification: 7/26/2023 to 9/8/2023.     Outpatient Physical Therapy 2 times weekly for 6 weeks to include the following interventions: Electrical Stimulation IFC/Biphasic, Manual Therapy, Moist Heat/ Ice, Neuromuscular Re-ed, Patient Education, Self Care, Therapeutic Activities, Therapeutic Exercise, and Ultrasound.     Alfredo Christine, PT , DPT

## 2023-07-28 ENCOUNTER — CLINICAL SUPPORT (OUTPATIENT)
Dept: REHABILITATION | Facility: HOSPITAL | Age: 53
End: 2023-07-28
Payer: COMMERCIAL

## 2023-07-28 DIAGNOSIS — R52 PAIN: Primary | ICD-10-CM

## 2023-07-28 PROCEDURE — 97014 ELECTRIC STIMULATION THERAPY: CPT

## 2023-07-28 PROCEDURE — 97110 THERAPEUTIC EXERCISES: CPT

## 2023-08-01 ENCOUNTER — CLINICAL SUPPORT (OUTPATIENT)
Dept: REHABILITATION | Facility: HOSPITAL | Age: 53
End: 2023-08-01
Payer: COMMERCIAL

## 2023-08-01 DIAGNOSIS — R52 PAIN: Primary | ICD-10-CM

## 2023-08-01 PROCEDURE — 97110 THERAPEUTIC EXERCISES: CPT | Mod: CQ

## 2023-08-01 PROCEDURE — 97140 MANUAL THERAPY 1/> REGIONS: CPT | Mod: CQ

## 2023-08-01 PROCEDURE — 97014 ELECTRIC STIMULATION THERAPY: CPT | Mod: CQ

## 2023-08-01 NOTE — PROGRESS NOTES
"[facility]   Physical Therapy Treatment Note    Name: Mary Johansen  Clinic Number: [id number]     Therapy Diagnosis:        Encounter Diagnosis   Name Primary?    S/P arthroscopy of right shoulder          Physician: Jg Driscoll MD    Visit Date: 7/28/2023     Physician Orders: PT Eval and Treat   Medical Diagnosis from Referral: s/p arthroscopy of right shoulder  Evaluation Date: 7/26/2023  Authorization Period Expiration: 7/23/2024  Plan of Care Expiration: 9/8/2023  Progress Note Due: 9/8/2023  Visit # / Visits authorized: 3/12      Precautions: Standard      Time In: 12:30   Time Out: 13:20  Total Billable Time:  50 minutes    PTA Visit #: 1/5   Subjective   Pt reports: "I took some medicine before I came this time."    She was compliant with home exercise program.  Response to previous treatment: soreness  Functional change: too early in plan of care     Pain: 3/10  Location: right shoulder      Objective      Objective Measures updated at progress report unless specified.     Treatment   Mary received the treatments listed below:       therapeutic exercises to develop strength, endurance, ROM, flexibility, posture, and core stabilization including:     Ther-Ex  Reps   Pulleys     Ball Rolls     Finger Ladder     Scap retractions 30 x 3"   Upper Trap Stretch 2 x 30" R side only    Levator Scap Stretch     Ball Squeeze 30 x   Wrist flexion/extension 30 x    Radial/ulnar deviation 30 x    Pronation/supination 30 x          Cane flexion stretch     Cane ER stretch     Sleeper stretch           Ther-Act     I,Y,T's     Prone Rows     Prone Abduction     Prone Extension                 Passive range of motion:  Flexion: 74 degrees  Abduction: 35 degrees (Not measured)  ER: 29 degrees  IR: 54 degrees        Manual Therapy: Joint oscillations grade 1 and 2 provided in conjunction with passive range of motion to increase relaxation.    supervised modalities after being cleared " for contradictions: IFC Electrical Stimulation:  Mary received IFC Electrical Stimulation for pain control applied to the R shoulder. Pt received stimulation for 10 minutes. Mary tolderated treatment well without any adverse effects.       cold pack for 10 minutes with estim.  Patient Education and Home Exercises     Written Home Exercises Provided: Patient instructed to cont prior HEP. Exercises were reviewed and Mary was able to demonstrate them prior to the end of the session.  Mary demonstrated good  understanding of the education provided. See EMR under Patient Instructions for exercises provided during therapy sessions    Assessment   Mary is a 52 y.o. female referred to outpatient Physical Therapy with a medical diagnosis of R rotator cuff repair. Patient presents with increased pain, decreased strength, and decreased range of motion that limits functional mobility and ADL's. 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 Patient limited in exercises per MD protocol. Pt required minimal cueing for progressing through wrist ROM Exercise without compensations. LPTA increased repetitions with exercises. Pt required max cueing for relaxation during passive range of motion without accomplishing full relaxation.  No adverse effects noted to tx.     Mary Is progressing well towards her goals.   Pt prognosis is Good.     Pt will continue to benefit from skilled outpatient physical therapy to address the deficits listed in the problem list box on initial evaluation, provide pt/family education and to maximize pt's level of independence in the home and community environment.     Pt's spiritual, cultural and educational needs considered and pt agreeable to plan of care and goals.     Anticipated barriers to physical therapy:  work tasks, compliance with Home exercise program     Goals:  Short Term Goals: 3 weeks   Pt will increase R shoulder flexion to  120 degrees, external rotation to C4, and internal rotation to L2 for independent dressing  Pt will increase R upper extremity to 3/5 to allow patient to perform activities of daily living independently  Pt will report decrease in pain to 5/10 to improve quality of life     Long Term Goals: 6 weeks   Pt will increase R shoulder flexion to 155 degrees, external rotation to C4, and internal rotation to L2 for independent dressing  Pt will increase R upper extremity to 4/5 to allow patient to perform activities of daily living independently  Pt will report decrease in pain to 3/10 to improve quality of life  Pt will place 5 pound object in overhead shelf without hike and with less than or equal to 4/10 pain to allow patient to return to prior level of function    Plan      Plan of care Certification: 7/26/2023 to 9/8/2023.     Outpatient Physical Therapy 2 times weekly for 6 weeks to include the following interventions: Electrical Stimulation IFC/Biphasic, Manual Therapy, Moist Heat/ Ice, Neuromuscular Re-ed, Patient Education, Self Care, Therapeutic Activities, Therapeutic Exercise, and Ultrasound.   GRZEGORZ Bella  8/1/2023

## 2023-08-03 ENCOUNTER — CLINICAL SUPPORT (OUTPATIENT)
Dept: REHABILITATION | Facility: HOSPITAL | Age: 53
End: 2023-08-03
Payer: COMMERCIAL

## 2023-08-03 DIAGNOSIS — R52 PAIN: Primary | ICD-10-CM

## 2023-08-03 PROCEDURE — 97014 ELECTRIC STIMULATION THERAPY: CPT | Mod: CQ

## 2023-08-03 PROCEDURE — 97110 THERAPEUTIC EXERCISES: CPT | Mod: CQ

## 2023-08-03 NOTE — PROGRESS NOTES
"  [facility]   Physical Therapy Treatment Note    Name: Mary Johansen  Clinic Number: [id number]     Therapy Diagnosis:        Encounter Diagnosis   Name Primary?    S/P arthroscopy of right shoulder          Physician: Jg Driscoll MD    Visit Date: 7/28/2023     Physician Orders: PT Eval and Treat   Medical Diagnosis from Referral: s/p arthroscopy of right shoulder  Evaluation Date: 7/26/2023  Authorization Period Expiration: 7/23/2024  Plan of Care Expiration: 9/8/2023  Progress Note Due: 9/8/2023  Visit # / Visits authorized: 4/12      Precautions: Standard      Time In: 8:56   Time Out: 9:45  Total Billable Time:  49 minutes    PTA Visit #: 2/5   Subjective   Pt reports: "I'm not having much pain but I didn't take any medicine because I drove myself here."    She was compliant with home exercise program.  Response to previous treatment: soreness  Functional change: too early in plan of care     Pain: 3/10  Location: right shoulder      Objective      Objective Measures updated at progress report unless specified.     Treatment   Mary received the treatments listed below:       therapeutic exercises to develop strength, endurance, ROM, flexibility, posture, and core stabilization including:     Ther-Ex  Reps   Pendulums  30 x each   Ball Rolls     Finger Ladder     Scap retractions 30 x 3"   Upper Trap Stretch 3 x 30" R side only    Levator Scap Stretch     Ball Squeeze 30 x   Wrist flexion/extension 30 x    Radial/ulnar deviation 30 x    Pronation/supination 30 x          Cane flexion stretch     Cane ER stretch     Sleeper stretch           Ther-Act     I,Y,T's     Prone Rows     Prone Abduction     Prone Extension                 Passive range of motion:  Flexion: 74 degrees  Abduction: 35 degrees (Not measured)  ER: 29 degrees  IR: 54 degrees        Manual Therapy: Joint oscillations grade 1 and 2 provided in conjunction with passive range of motion to increase " relaxation.    supervised modalities after being cleared for contradictions: IFC Electrical Stimulation:  Mary received IFC Electrical Stimulation for pain control applied to the R shoulder. Pt received stimulation for 10 minutes. Mary tolderated treatment well without any adverse effects.       cold pack for 10 minutes with estim.  Patient Education and Home Exercises     Written Home Exercises Provided: Patient instructed to cont prior HEP. Exercises were reviewed and Mary was able to demonstrate them prior to the end of the session.  Mary demonstrated good  understanding of the education provided. See EMR under Patient Instructions for exercises provided during therapy sessions    Assessment   Mary is a 52 y.o. female referred to outpatient Physical Therapy with a medical diagnosis of R rotator cuff repair. Patient presents with increased pain, decreased strength, and decreased range of motion that limits functional mobility and ADL's. 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 Patient limited in exercises per MD protocol. Pt required minimal cueing for progressing through wrist ROM Exercise without compensations. LPTA added pendulums to further encourage relaxation. Tx intensity not increased secondary to MD protocol and recent increase in repetitions. Pt displayed increased muscle guarding during passive range of motion secondary to c/o pain with no medication.  No adverse effects noted or reported.     Mary Is progressing well towards her goals.   Pt prognosis is Good.     Pt will continue to benefit from skilled outpatient physical therapy to address the deficits listed in the problem list box on initial evaluation, provide pt/family education and to maximize pt's level of independence in the home and community environment.     Pt's spiritual, cultural and educational needs considered and pt agreeable to plan of care and goals.      Anticipated barriers to physical therapy:  work tasks, compliance with Home exercise program     Goals:  Short Term Goals: 3 weeks   Pt will increase R shoulder flexion to 120 degrees, external rotation to C4, and internal rotation to L2 for independent dressing  Pt will increase R upper extremity to 3/5 to allow patient to perform activities of daily living independently  Pt will report decrease in pain to 5/10 to improve quality of life     Long Term Goals: 6 weeks   Pt will increase R shoulder flexion to 155 degrees, external rotation to C4, and internal rotation to L2 for independent dressing  Pt will increase R upper extremity to 4/5 to allow patient to perform activities of daily living independently  Pt will report decrease in pain to 3/10 to improve quality of life  Pt will place 5 pound object in overhead shelf without hike and with less than or equal to 4/10 pain to allow patient to return to prior level of function    Plan      Plan of care Certification: 7/26/2023 to 9/8/2023.     Outpatient Physical Therapy 2 times weekly for 6 weeks to include the following interventions: Electrical Stimulation IFC/Biphasic, Manual Therapy, Moist Heat/ Ice, Neuromuscular Re-ed, Patient Education, Self Care, Therapeutic Activities, Therapeutic Exercise, and Ultrasound.   GRZEGORZ Bella  8/3/2023

## 2023-08-07 ENCOUNTER — CLINICAL SUPPORT (OUTPATIENT)
Dept: REHABILITATION | Facility: HOSPITAL | Age: 53
End: 2023-08-07
Payer: COMMERCIAL

## 2023-08-07 DIAGNOSIS — R52 PAIN: Primary | ICD-10-CM

## 2023-08-07 PROCEDURE — 97014 ELECTRIC STIMULATION THERAPY: CPT

## 2023-08-07 PROCEDURE — 97140 MANUAL THERAPY 1/> REGIONS: CPT

## 2023-08-07 PROCEDURE — 97110 THERAPEUTIC EXERCISES: CPT

## 2023-08-07 NOTE — PROGRESS NOTES
"  [facility]   Physical Therapy Treatment Note    Name: Mary Johansen  Clinic Number: [id number]     Therapy Diagnosis:        Encounter Diagnosis   Name Primary?    S/P arthroscopy of right shoulder          Physician: Jg Driscoll MD    Visit Date: 7/28/2023     Physician Orders: PT Eval and Treat   Medical Diagnosis from Referral: s/p arthroscopy of right shoulder  Evaluation Date: 7/26/2023  Authorization Period Expiration: 7/23/2024  Plan of Care Expiration: 9/8/2023  Progress Note Due: 9/8/2023  Visit # / Visits authorized: 5/12      Precautions: Standard      Time In: 9:27   Time Out: 10:19  Total Billable Time:  52 minutes    PTA Visit #: 0/5   Subjective   Pt reports: "I keep having muscle spasms or something in this muscle." Patient pointing to bicep reporting that muscle hardens with random events of pain.     She was compliant with home exercise program.  Response to previous treatment: soreness  Functional change: too early in plan of care     Pain: 3/10  Location: right shoulder      Objective      Objective Measures updated at progress report unless specified.     Treatment   Mary received the treatments listed below:       therapeutic exercises to develop strength, endurance, ROM, flexibility, posture, and core stabilization including:     Ther-Ex  Reps   Pendulums  30 x each   Ball Rolls     Finger Ladder     Scap retractions 30 x 3"   Upper Trap Stretch 3 x 30" R side only    Levator Scap Stretch     Ball Squeeze 30 x   Wrist flexion/extension 30 x    Radial/ulnar deviation 30 x    Pronation/supination 30 x          Cane flexion stretch     Cane ER stretch     Sleeper stretch           Ther-Act     I,Y,T's     Prone Rows     Prone Abduction     Prone Extension                 Passive range of motion:  Flexion: 74 degrees  Abduction: 35 degrees (Not measured)  ER: 29 degrees  IR: 54 degrees        Manual Therapy: Joint oscillations grade 1 and 2 provided in " conjunction with passive range of motion to increase relaxation.    supervised modalities after being cleared for contradictions: IFC Electrical Stimulation:  Mary received IFC Electrical Stimulation for pain control applied to the R shoulder. Pt received stimulation for 10 minutes. Mary tolderated treatment well without any adverse effects.       cold pack for 10 minutes with estim.  Patient Education and Home Exercises     Written Home Exercises Provided: Patient instructed to cont prior HEP. Exercises were reviewed and Mary was able to demonstrate them prior to the end of the session.  Mary demonstrated good  understanding of the education provided. See EMR under Patient Instructions for exercises provided during therapy sessions    Assessment   Mary is a 52 y.o. female referred to outpatient Physical Therapy with a medical diagnosis of R rotator cuff repair. Patient presents with increased pain, decreased strength, and decreased range of motion that limits functional mobility and ADL's. 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 Patient limited in exercises per MD protocol. Patient with good carry-over with exercises only requiring occasional cuing to decrease speed of movement. Patient continues to report tightness in superior bicep. Patient without reports of increased pain during wrist and scapular therapeutic exercises. Passive range of motion performed with joint mobs and oscillations to decrease pain and muscle guarding. Patient unable to relax for stretch to be performed. Modalities used to decrease post treatment soreness and pain. No adverse effects noted or reported.     Mary Is progressing well towards her goals.   Pt prognosis is Good.     Pt will continue to benefit from skilled outpatient physical therapy to address the deficits listed in the problem list box on initial evaluation, provide pt/family education and to maximize pt's  level of independence in the home and community environment.     Pt's spiritual, cultural and educational needs considered and pt agreeable to plan of care and goals.     Anticipated barriers to physical therapy:  work tasks, compliance with Home exercise program     Goals:  Short Term Goals: 3 weeks   Pt will increase R shoulder flexion to 120 degrees, external rotation to C4, and internal rotation to L2 for independent dressing  Pt will increase R upper extremity to 3/5 to allow patient to perform activities of daily living independently  Pt will report decrease in pain to 5/10 to improve quality of life     Long Term Goals: 6 weeks   Pt will increase R shoulder flexion to 155 degrees, external rotation to C4, and internal rotation to L2 for independent dressing  Pt will increase R upper extremity to 4/5 to allow patient to perform activities of daily living independently  Pt will report decrease in pain to 3/10 to improve quality of life  Pt will place 5 pound object in overhead shelf without hike and with less than or equal to 4/10 pain to allow patient to return to prior level of function    Plan      Plan of care Certification: 7/26/2023 to 9/8/2023.     Outpatient Physical Therapy 2 times weekly for 6 weeks to include the following interventions: Electrical Stimulation IFC/Biphasic, Manual Therapy, Moist Heat/ Ice, Neuromuscular Re-ed, Patient Education, Self Care, Therapeutic Activities, Therapeutic Exercise, and Ultrasound.     Analy Camargo, PT, DPT   8/7/2023

## 2023-08-09 ENCOUNTER — CLINICAL SUPPORT (OUTPATIENT)
Dept: REHABILITATION | Facility: HOSPITAL | Age: 53
End: 2023-08-09
Payer: COMMERCIAL

## 2023-08-09 DIAGNOSIS — R52 PAIN: Primary | ICD-10-CM

## 2023-08-09 PROCEDURE — 97110 THERAPEUTIC EXERCISES: CPT

## 2023-08-09 PROCEDURE — 97014 ELECTRIC STIMULATION THERAPY: CPT

## 2023-08-09 PROCEDURE — 97140 MANUAL THERAPY 1/> REGIONS: CPT

## 2023-08-09 NOTE — PROGRESS NOTES
"  [facility]   Physical Therapy Treatment Note    Name: Mary Johansen  Clinic Number: [id number]     Therapy Diagnosis:        Encounter Diagnosis   Name Primary?    S/P arthroscopy of right shoulder          Physician: Jg Driscoll MD    Visit Date: 7/28/2023     Physician Orders: PT Eval and Treat   Medical Diagnosis from Referral: s/p arthroscopy of right shoulder  Evaluation Date: 7/26/2023  Authorization Period Expiration: 7/23/2024  Plan of Care Expiration: 9/8/2023  Progress Note Due: 9/8/2023  Visit # / Visits authorized: 6/12      Precautions: Standard      Time In: 9:40  Time Out: 10:25  Total Billable Time:  45 minutes    PTA Visit #: 0/5   Subjective   Pt reports: "I worked around my house a lot yesterday."     She was compliant with home exercise program.  Response to previous treatment: soreness  Functional change: too early in plan of care     Pain: 4/10  Location: right shoulder      Objective      Objective Measures updated at progress report unless specified.     Treatment   Mary received the treatments listed below:       therapeutic exercises to develop strength, endurance, ROM, flexibility, posture, and core stabilization including:     Ther-Ex  Reps   Pendulums  30 x each   Ball Rolls     Finger Ladder     Scap retractions 30 x 3"   Upper Trap Stretch 3 x 30" R side only    Levator Scap Stretch     Hand Gripper  30 x Green *   Wrist flexion/extension 30 x    Radial/ulnar deviation 30 x    Pronation/supination 30 x          Cane flexion stretch     Cane ER stretch     Sleeper stretch           Ther-Act     I,Y,T's     Prone Rows     Prone Abduction     Prone Extension                 Passive range of motion:  Flexion: 74 degrees  Abduction: 35 degrees (Not measured)  ER: 29 degrees  IR: 54 degrees        Manual Therapy: Joint oscillations grade 1 and 2 provided in conjunction with passive range of motion to decrease guarding.     supervised modalities " after being cleared for contradictions: IFC Electrical Stimulation:  Mary received IFC Electrical Stimulation for pain control applied to the R shoulder. Pt received stimulation for 10 minutes. Mary tolderated treatment well without any adverse effects.       cold pack for 10 minutes with estim.  Patient Education and Home Exercises     Written Home Exercises Provided: Patient instructed to cont prior HEP. Exercises were reviewed and Mary was able to demonstrate them prior to the end of the session.  Mary demonstrated good  understanding of the education provided. See EMR under Patient Instructions for exercises provided during therapy sessions    Assessment   Mary is a 52 y.o. female referred to outpatient Physical Therapy with a medical diagnosis of R rotator cuff repair. Patient presents with increased pain, decreased strength, and decreased range of motion that limits functional mobility and ADL's. 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 Patient limited in exercises per MD protocol. Patient continues to demonstrate  good carry-over with exercises only requiring occasional cuing to decrease speed of movement. PT provided maximal verbal and tactile cueing to decrease guarding during manual interventions and passive range of motion.  Patient continues to demonstrate moderate/ maximal guarding regardless of cueing. No adverse effects noted to treatment.     Mary Is progressing well towards her goals.   Pt prognosis is Good.     Pt will continue to benefit from skilled outpatient physical therapy to address the deficits listed in the problem list box on initial evaluation, provide pt/family education and to maximize pt's level of independence in the home and community environment.     Pt's spiritual, cultural and educational needs considered and pt agreeable to plan of care and goals.     Anticipated barriers to physical therapy:  work tasks,  compliance with Home exercise program     Goals:  Short Term Goals: 3 weeks   Pt will increase R shoulder flexion to 120 degrees, external rotation to C4, and internal rotation to L2 for independent dressing  Pt will increase R upper extremity to 3/5 to allow patient to perform activities of daily living independently  Pt will report decrease in pain to 5/10 to improve quality of life     Long Term Goals: 6 weeks   Pt will increase R shoulder flexion to 155 degrees, external rotation to C4, and internal rotation to L2 for independent dressing  Pt will increase R upper extremity to 4/5 to allow patient to perform activities of daily living independently  Pt will report decrease in pain to 3/10 to improve quality of life  Pt will place 5 pound object in overhead shelf without hike and with less than or equal to 4/10 pain to allow patient to return to prior level of function    Plan      Plan of care Certification: 7/26/2023 to 9/8/2023.     Outpatient Physical Therapy 2 times weekly for 6 weeks to include the following interventions: Electrical Stimulation IFC/Biphasic, Manual Therapy, Moist Heat/ Ice, Neuromuscular Re-ed, Patient Education, Self Care, Therapeutic Activities, Therapeutic Exercise, and Ultrasound.     Alfredo Christine, PT, DPT   8/9/2023

## 2023-08-15 ENCOUNTER — CLINICAL SUPPORT (OUTPATIENT)
Dept: REHABILITATION | Facility: HOSPITAL | Age: 53
End: 2023-08-15
Payer: COMMERCIAL

## 2023-08-15 DIAGNOSIS — R52 PAIN: Primary | ICD-10-CM

## 2023-08-15 PROCEDURE — 97140 MANUAL THERAPY 1/> REGIONS: CPT | Mod: CQ

## 2023-08-15 PROCEDURE — 97110 THERAPEUTIC EXERCISES: CPT | Mod: CQ

## 2023-08-15 PROCEDURE — 97014 ELECTRIC STIMULATION THERAPY: CPT | Mod: CQ

## 2023-08-15 NOTE — PROGRESS NOTES
"  [facility]   Physical Therapy Treatment Note    Name: Mary Johansen  Clinic Number: [id number]     Therapy Diagnosis:        Encounter Diagnosis   Name Primary?    S/P arthroscopy of right shoulder          Physician: Jg Driscoll MD    Visit Date: 7/28/2023     Physician Orders: PT Eval and Treat   Medical Diagnosis from Referral: s/p arthroscopy of right shoulder  Evaluation Date: 7/26/2023  Authorization Period Expiration: 7/23/2024  Plan of Care Expiration: 9/8/2023  Progress Note Due: 9/8/2023  Visit # / Visits authorized: 6/12      Precautions: Standard      Time In: 9:25  Time Out: 10:23  Total Billable Time:  58 minutes    PTA Visit #: 1/5   Subjective   Pt reports: "My bicep is always the main thing hurting ."     She was compliant with home exercise program.  Response to previous treatment: soreness  Functional change: too early in plan of care     Pain: 4/10  Location: right shoulder      Objective      Objective Measures updated at progress report unless specified.     Treatment   Mary received the treatments listed below:       therapeutic exercises to develop strength, endurance, ROM, flexibility, posture, and core stabilization including:     Ther-Ex  Reps   Pendulums  30 x each   Ball Rolls     Finger Ladder     Scap retractions 30 x 3"   Upper Trap Stretch 3 x 30" R side only    Levator Scap Stretch     Hand Gripper  30 x Green *   Wrist flexion/extension 30 x    Radial/ulnar deviation 30 x    Pronation/supination 30 x          Cane flexion stretch     Cane ER stretch     Sleeper stretch           Ther-Act     I,Y,T's     Prone Rows     Prone Abduction     Prone Extension                 Passive range of motion:  Flexion: 74 degrees  Abduction: 35 degrees (Not measured)  ER: 29 degrees  IR: 54 degrees        Manual Therapy: Joint oscillations grade 1 and 2 provided in conjunction with passive range of motion to decrease guarding.     supervised modalities " after being cleared for contradictions: IFC Electrical Stimulation:  Mary received IFC Electrical Stimulation for pain control applied to the R shoulder. Pt received stimulation for 10 minutes. Mary tolderated treatment well without any adverse effects.       cold pack for 10 minutes with estim.  Patient Education and Home Exercises     Written Home Exercises Provided: Patient instructed to cont prior HEP. Exercises were reviewed and Mary was able to demonstrate them prior to the end of the session.  Mary demonstrated good  understanding of the education provided. See EMR under Patient Instructions for exercises provided during therapy sessions    Assessment   Mary is a 52 y.o. female referred to outpatient Physical Therapy with a medical diagnosis of R rotator cuff repair. Patient presents with increased pain, decreased strength, and decreased range of motion that limits functional mobility and ADL's. 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 Patient limited in exercises per MD protocol. Pt continues to demonstrate improved carry-over with exercises only requiring occasional cuing to decrease speed of movement. Pt requires maximal cueing for relaxation with passive range of motion.  Pt continues to demonstrate moderate/ maximal guarding regardless of cueing. No adverse effects noted to tx.     Mary Is progressing well towards her goals.   Pt prognosis is Good.     Pt will continue to benefit from skilled outpatient physical therapy to address the deficits listed in the problem list box on initial evaluation, provide pt/family education and to maximize pt's level of independence in the home and community environment.     Pt's spiritual, cultural and educational needs considered and pt agreeable to plan of care and goals.     Anticipated barriers to physical therapy:  work tasks, compliance with Home exercise program     Goals:  Short Term Goals:  3 weeks   Pt will increase R shoulder flexion to 120 degrees, external rotation to C4, and internal rotation to L2 for independent dressing  Pt will increase R upper extremity to 3/5 to allow patient to perform activities of daily living independently  Pt will report decrease in pain to 5/10 to improve quality of life     Long Term Goals: 6 weeks   Pt will increase R shoulder flexion to 155 degrees, external rotation to C4, and internal rotation to L2 for independent dressing  Pt will increase R upper extremity to 4/5 to allow patient to perform activities of daily living independently  Pt will report decrease in pain to 3/10 to improve quality of life  Pt will place 5 pound object in overhead shelf without hike and with less than or equal to 4/10 pain to allow patient to return to prior level of function    Plan      Plan of care Certification: 7/26/2023 to 9/8/2023.     Outpatient Physical Therapy 2 times weekly for 6 weeks to include the following interventions: Electrical Stimulation IFC/Biphasic, Manual Therapy, Moist Heat/ Ice, Neuromuscular Re-ed, Patient Education, Self Care, Therapeutic Activities, Therapeutic Exercise, and Ultrasound.   GRZEGORZ Bella   8/15/2023

## 2023-08-17 ENCOUNTER — CLINICAL SUPPORT (OUTPATIENT)
Dept: REHABILITATION | Facility: HOSPITAL | Age: 53
End: 2023-08-17
Payer: COMMERCIAL

## 2023-08-17 DIAGNOSIS — R52 PAIN: Primary | ICD-10-CM

## 2023-08-17 PROCEDURE — 97140 MANUAL THERAPY 1/> REGIONS: CPT

## 2023-08-17 PROCEDURE — 97110 THERAPEUTIC EXERCISES: CPT

## 2023-08-17 NOTE — PROGRESS NOTES
"  [facility]   Physical Therapy Treatment Note    Name: Mary Johansen  Clinic Number: [id number]     Therapy Diagnosis:        Encounter Diagnosis   Name Primary?    S/P arthroscopy of right shoulder          Physician: Jg Driscoll MD    Visit Date: 7/28/2023     Physician Orders: PT Eval and Treat   Medical Diagnosis from Referral: s/p arthroscopy of right shoulder  Evaluation Date: 7/26/2023  Authorization Period Expiration: 7/23/2024  Plan of Care Expiration: 9/8/2023  Progress Note Due: 9/8/2023  Visit # / Visits authorized: 7/12      Precautions: Stpandard      Time In: 9:29  Time Out: 1024  Total Billable Time:  55 minutes    PTA Visit #: 1/5   Subjective   Pt reports: "My arm is feeling better."     She was compliant with home exercise program.  Response to previous treatment: soreness  Functional change: too early in plan of care     Pain: 4/10  Location: right shoulder      Objective      Objective Measures updated at progress report unless specified.     Treatment   Mary received the treatments listed below:       therapeutic exercises to develop strength, endurance, ROM, flexibility, posture, and core stabilization including:     Ther-Ex  Reps   Pendulums  30 x each   Ball Rolls     Finger Ladder     Scap retractions 30 x 3"   Upper Trap Stretch 3 x 30" R side only    Levator Scap Stretch     Hand Gripper  30 x Green *   Wrist flexion/extension 30 x    Radial/ulnar deviation 30 x    Pronation/supination 30 x          Cane flexion stretch     Cane ER stretch     Sleeper stretch           Ther-Act     I,Y,T's     Prone Rows     Prone Abduction     Prone Extension            Pulleys in to flexion  6 min   Passive range of motion:  Flexion: 91 degrees  Abduction: NT  ER: 25 degrees  IR: 54 degrees        Manual Therapy: manual shoulder Passive range of motion stretches x 20 minutes to decrease guarding and increase ROM.     supervised modalities after being cleared for " contradictions: IFC Electrical Stimulation:  Mary received IFC Electrical Stimulation for pain control applied to the R shoulder. Pt received stimulation for 10 minutes. Mary tolderated treatment well without any adverse effects.       cold pack for 10 minutes with estim.  Patient Education and Home Exercises     Written Home Exercises Provided: Patient instructed to cont prior HEP. Exercises were reviewed and Mary was able to demonstrate them prior to the end of the session.  Mary demonstrated good  understanding of the education provided. See EMR under Patient Instructions for exercises provided during therapy sessions    Assessment   Mary is a 52 y.o. female referred to outpatient Physical Therapy with a medical diagnosis of R rotator cuff repair. Patient presents with improving Passive range of motion today and less guarding with stretches.  Further PT will be very beneficial    Mary Is progressing well towards her goals.   Pt prognosis is Good.     Pt will continue to benefit from skilled outpatient physical therapy to address the deficits listed in the problem list box on initial evaluation, provide pt/family education and to maximize pt's level of independence in the home and community environment.     Pt's spiritual, cultural and educational needs considered and pt agreeable to plan of care and goals.     Anticipated barriers to physical therapy:  work tasks, compliance with Home exercise program     Goals:  Short Term Goals: 3 weeks   Pt will increase R shoulder flexion to 120 degrees, external rotation to C4, and internal rotation to L2 for independent dressing  Pt will increase R upper extremity to 3/5 to allow patient to perform activities of daily living independently  Pt will report decrease in pain to 5/10 to improve quality of life     Long Term Goals: 6 weeks   Pt will increase R shoulder flexion to 155 degrees, external rotation to C4, and internal rotation to L2 for  independent dressing  Pt will increase R upper extremity to 4/5 to allow patient to perform activities of daily living independently  Pt will report decrease in pain to 3/10 to improve quality of life  Pt will place 5 pound object in overhead shelf without hike and with less than or equal to 4/10 pain to allow patient to return to prior level of function    Plan      Plan of care Certification: 7/26/2023 to 9/8/2023.     Outpatient Physical Therapy 2 times weekly for 6 weeks to include the following interventions: Electrical Stimulation IFC/Biphasic, Manual Therapy, Moist Heat/ Ice, Neuromuscular Re-ed, Patient Education, Self Care, Therapeutic Activities, Therapeutic Exercise, and Ultrasound.   ERENDIRA ZHAO, PT, ATP  8/17/2023

## 2023-08-22 ENCOUNTER — CLINICAL SUPPORT (OUTPATIENT)
Dept: REHABILITATION | Facility: HOSPITAL | Age: 53
End: 2023-08-22
Payer: COMMERCIAL

## 2023-08-22 DIAGNOSIS — R52 PAIN: Primary | ICD-10-CM

## 2023-08-22 PROCEDURE — 97110 THERAPEUTIC EXERCISES: CPT | Mod: CQ

## 2023-08-22 PROCEDURE — 97014 ELECTRIC STIMULATION THERAPY: CPT | Mod: CQ

## 2023-08-22 NOTE — PROGRESS NOTES
"  [facility]   Physical Therapy Treatment Note    Name: Mary Johansen  Clinic Number: [id number]     Therapy Diagnosis:        Encounter Diagnosis   Name Primary?    S/P arthroscopy of right shoulder          Physician: Jg Driscoll MD    Visit Date: 7/28/2023     Physician Orders: PT Eval and Treat   Medical Diagnosis from Referral: s/p arthroscopy of right shoulder  Evaluation Date: 7/26/2023  Authorization Period Expiration: 7/23/2024  Plan of Care Expiration: 9/8/2023  Progress Note Due: 9/8/2023  Visit # / Visits authorized: 9/12      Precautions: Stpandard      Time In: 9:45  Time Out:10:30  Total Billable Time:  45 minutes    PTA Visit #: 1/5   Subjective   Pt reports: "I did a good bit around the house yesterday, so I'm a little sore."     She was compliant with home exercise program.  Response to previous treatment: soreness  Functional change: too early in plan of care     Pain: 4/10  Location: right shoulder      Objective      Objective Measures updated at progress report unless specified.     Treatment   Mary received the treatments listed below:       therapeutic exercises to develop strength, endurance, ROM, flexibility, posture, and core stabilization including:     Ther-Ex  Reps   Pendulums  30 x each   Ball Rolls     Finger Ladder     Scap retractions 30 x 3"   Upper Trap Stretch 3 x 30" R side only    Levator Scap Stretch     Hand Gripper  30 x Green *   Wrist flexion/extension 30 x    Radial/ulnar deviation 30 x    Pronation/supination 30 x          Cane flexion stretch     Cane ER stretch     Sleeper stretch           Ther-Act     I,Y,T's     Prone Rows     Prone Abduction     Prone Extension            Pulleys in to flexion  6 min   Passive range of motion:  Flexion: 91 degrees  Abduction: NT  ER: 25 degrees  IR: 54 degrees        Manual Therapy: manual shoulder Passive range of motion stretches x 20 minutes to decrease guarding and increase ROM. "     supervised modalities after being cleared for contradictions: IFC Electrical Stimulation:  Mary received IFC Electrical Stimulation for pain control applied to the R shoulder. Pt received stimulation for 10 minutes. Mary tolderated treatment well without any adverse effects.       cold pack for 10 minutes with estim.  Patient Education and Home Exercises     Written Home Exercises Provided: Patient instructed to cont prior HEP. Exercises were reviewed and Mary was able to demonstrate them prior to the end of the session.  Mary demonstrated good  understanding of the education provided. See EMR under Patient Instructions for exercises provided during therapy sessions    Assessment   Mary is a 52 y.o. female referred to outpatient Physical Therapy with a medical diagnosis of R rotator cuff repair.  Pt displayed increased carryover of previous tx session with decreased guarding noted throughout tx. Pt with improved range of motion secondary to decreased guarding. No adverse effects.    Mary Is progressing well towards her goals.   Pt prognosis is Good.     Pt will continue to benefit from skilled outpatient physical therapy to address the deficits listed in the problem list box on initial evaluation, provide pt/family education and to maximize pt's level of independence in the home and community environment.     Pt's spiritual, cultural and educational needs considered and pt agreeable to plan of care and goals.     Anticipated barriers to physical therapy:  work tasks, compliance with Home exercise program     Goals:  Short Term Goals: 3 weeks   Pt will increase R shoulder flexion to 120 degrees, external rotation to C4, and internal rotation to L2 for independent dressing  Pt will increase R upper extremity to 3/5 to allow patient to perform activities of daily living independently  Pt will report decrease in pain to 5/10 to improve quality of life     Long Term Goals: 6 weeks   Pt  will increase R shoulder flexion to 155 degrees, external rotation to C4, and internal rotation to L2 for independent dressing  Pt will increase R upper extremity to 4/5 to allow patient to perform activities of daily living independently  Pt will report decrease in pain to 3/10 to improve quality of life  Pt will place 5 pound object in overhead shelf without hike and with less than or equal to 4/10 pain to allow patient to return to prior level of function    Plan      Plan of care Certification: 7/26/2023 to 9/8/2023.     Outpatient Physical Therapy 2 times weekly for 6 weeks to include the following interventions: Electrical Stimulation IFC/Biphasic, Manual Therapy, Moist Heat/ Ice, Neuromuscular Re-ed, Patient Education, Self Care, Therapeutic Activities, Therapeutic Exercise, and Ultrasound.   GRZEGORZ Bella  8/22/2023

## 2023-08-24 ENCOUNTER — OFFICE VISIT (OUTPATIENT)
Dept: ORTHOPEDICS | Facility: CLINIC | Age: 53
End: 2023-08-24
Payer: COMMERCIAL

## 2023-08-24 DIAGNOSIS — Z98.890 S/P ARTHROSCOPY OF RIGHT SHOULDER: Primary | ICD-10-CM

## 2023-08-24 PROCEDURE — 1160F PR REVIEW ALL MEDS BY PRESCRIBER/CLIN PHARMACIST DOCUMENTED: ICD-10-PCS | Mod: ,,, | Performed by: ORTHOPAEDIC SURGERY

## 2023-08-24 PROCEDURE — 1159F PR MEDICATION LIST DOCUMENTED IN MEDICAL RECORD: ICD-10-PCS | Mod: ,,, | Performed by: ORTHOPAEDIC SURGERY

## 2023-08-24 PROCEDURE — 99213 OFFICE O/P EST LOW 20 MIN: CPT | Mod: PBBFAC | Performed by: ORTHOPAEDIC SURGERY

## 2023-08-24 PROCEDURE — 1160F RVW MEDS BY RX/DR IN RCRD: CPT | Mod: ,,, | Performed by: ORTHOPAEDIC SURGERY

## 2023-08-24 PROCEDURE — 1159F MED LIST DOCD IN RCRD: CPT | Mod: ,,, | Performed by: ORTHOPAEDIC SURGERY

## 2023-08-24 PROCEDURE — 99024 POSTOP FOLLOW-UP VISIT: CPT | Mod: ,,, | Performed by: ORTHOPAEDIC SURGERY

## 2023-08-24 PROCEDURE — 99024 PR POST-OP FOLLOW-UP VISIT: ICD-10-PCS | Mod: ,,, | Performed by: ORTHOPAEDIC SURGERY

## 2023-08-24 PROCEDURE — 4010F PR ACE/ARB THEARPY RXD/TAKEN: ICD-10-PCS | Mod: ,,, | Performed by: ORTHOPAEDIC SURGERY

## 2023-08-24 PROCEDURE — 4010F ACE/ARB THERAPY RXD/TAKEN: CPT | Mod: ,,, | Performed by: ORTHOPAEDIC SURGERY

## 2023-08-24 NOTE — PROGRESS NOTES
Repair, Rotator Cuff, Arthroscopic - Right, Arthroscopy, Shoulder, With Subacromial Space Decompression - Right, Arthroscopy,shoulder,with Biceps Tenodesis - Right, and Arthroscopy, Shoulder, With Distal Clavicle Excision - Right 7/19/2023      Pt is here today for Second post-operative followup of her shoulder arthroscopy.      she is doing well. She is still doing physical therapy at [facility]      We have reviewed her findings and discussed plan of care and future treatment options, including the physical therapy plan.                                                                                     PHYSICAL EXAMINATION:     Incision sites healed well. Sling is in appropriate position  No evidence of any erythema, infection or induration  Range of motion of the shoulder: 0-45 FF, 0-15 er   Biceps tone appears appropriate      IMAGING: no new imaging    No image results found.                                                                                   ASSESSMENT:                                                                                                                                               1. Status post shoulder arthroscopy, doing well.                                                                                                                               PLAN:                                                                                                                                                     1. Continue with PT  2. Emphasized scapular stabilization to improve overall function.  3. I have discussed return to activity in detail.  4. she will see us back in 4 weeks.                                      5. All questions were answered and she should contact us if she  has any questions or concerns in the interim.              There are no Patient Instructions on file for this visit.

## 2023-08-25 ENCOUNTER — CLINICAL SUPPORT (OUTPATIENT)
Dept: REHABILITATION | Facility: HOSPITAL | Age: 53
End: 2023-08-25
Payer: COMMERCIAL

## 2023-08-25 DIAGNOSIS — R52 PAIN: Primary | ICD-10-CM

## 2023-08-25 PROCEDURE — 97110 THERAPEUTIC EXERCISES: CPT | Mod: CQ

## 2023-08-25 PROCEDURE — 97014 ELECTRIC STIMULATION THERAPY: CPT | Mod: CQ

## 2023-08-25 NOTE — PROGRESS NOTES
"  [facility]   Physical Therapy Treatment Note    Name: Mary Johansen  Clinic Number: [id number]     Therapy Diagnosis:        Encounter Diagnosis   Name Primary?    S/P arthroscopy of right shoulder          Physician: Jg Driscoll MD    Visit Date: 7/28/2023     Physician Orders: PT Eval and Treat   Medical Diagnosis from Referral: s/p arthroscopy of right shoulder  Evaluation Date: 7/26/2023  Authorization Period Expiration: 7/23/2024  Plan of Care Expiration: 9/8/2023  Progress Note Due: 9/8/2023  Visit # / Visits authorized: 10/12      Precautions: Stpandard      Time In: 9:24  Time Out:10:22  Total Billable Time:  58 minutes    PTA Visit #: 2/5   Subjective   Pt reports: "I feel pretty good".     She was compliant with home exercise program.  Response to previous treatment: soreness  Functional change: too early in plan of care     Pain: 4/10  Location: right shoulder      Objective      Objective Measures updated at progress report unless specified.     Treatment   Mary received the treatments listed below:       therapeutic exercises to develop strength, endurance, ROM, flexibility, posture, and core stabilization including:     Ther-Ex  Reps   Pulleys 4 minutes (flexion)   Pendulums  30 x each   Ball Rolls 5 x 10" *   Finger Ladder     Scap retractions 30 x 3"   Shoulder Shrugs 2 x 10 *   Bent over rows 2 x 10 *   Upper Trap Stretch 3 x 30" R side only    Levator Scap Stretch     Hand Gripper  30 x Green    Wrist flexion/extension 30 x    Radial/ulnar deviation 30 x    Pronation/supination 30 x          Cane flexion stretch     Cane ER stretch     Sleeper stretch           Ther-Act     I,Y,T's     Prone Rows     Prone Abduction     Prone Extension               Passive range of motion:  Flexion: 91 degrees  Abduction: NT  ER: 25 degrees  IR: 54 degrees        Manual Therapy: manual shoulder Passive range of motion stretches x 20 minutes to decrease guarding and " increase ROM.     supervised modalities after being cleared for contradictions: IFC Electrical Stimulation:  Mary received IFC Electrical Stimulation for pain control applied to the R shoulder. Pt received stimulation for 10 minutes. Mary tolderated treatment well without any adverse effects.       cold pack for 10 minutes with estim.  Patient Education and Home Exercises     Written Home Exercises Provided: Patient instructed to cont prior HEP. Exercises were reviewed and Mary was able to demonstrate them prior to the end of the session.  Mary demonstrated good  understanding of the education provided. See EMR under Patient Instructions for exercises provided during therapy sessions    Assessment   Mary is a 52 y.o. female referred to outpatient Physical Therapy with a medical diagnosis of R rotator cuff repair. LPTA provided pt with proper setup on pulleys to decrease stiffness and improve right shoulder range of motion. Tx intensity according to pt tolerance. Pt progressed through added exercises with no c/o pain. No adverse effects.    Mary Is progressing well towards her goals.   Pt prognosis is Good.     Pt will continue to benefit from skilled outpatient physical therapy to address the deficits listed in the problem list box on initial evaluation, provide pt/family education and to maximize pt's level of independence in the home and community environment.     Pt's spiritual, cultural and educational needs considered and pt agreeable to plan of care and goals.     Anticipated barriers to physical therapy:  work tasks, compliance with Home exercise program     Goals:  Short Term Goals: 3 weeks   Pt will increase R shoulder flexion to 120 degrees, external rotation to C4, and internal rotation to L2 for independent dressing  Pt will increase R upper extremity to 3/5 to allow patient to perform activities of daily living independently  Pt will report decrease in pain to 5/10 to  improve quality of life     Long Term Goals: 6 weeks   Pt will increase R shoulder flexion to 155 degrees, external rotation to C4, and internal rotation to L2 for independent dressing  Pt will increase R upper extremity to 4/5 to allow patient to perform activities of daily living independently  Pt will report decrease in pain to 3/10 to improve quality of life  Pt will place 5 pound object in overhead shelf without hike and with less than or equal to 4/10 pain to allow patient to return to prior level of function    Plan      Plan of care Certification: 7/26/2023 to 9/8/2023.     Outpatient Physical Therapy 2 times weekly for 6 weeks to include the following interventions: Electrical Stimulation IFC/Biphasic, Manual Therapy, Moist Heat/ Ice, Neuromuscular Re-ed, Patient Education, Self Care, Therapeutic Activities, Therapeutic Exercise, and Ultrasound.   GRZEGORZ Bella  8/25/2023

## 2023-08-29 ENCOUNTER — CLINICAL SUPPORT (OUTPATIENT)
Dept: REHABILITATION | Facility: HOSPITAL | Age: 53
End: 2023-08-29
Payer: COMMERCIAL

## 2023-08-29 DIAGNOSIS — R52 PAIN: Primary | ICD-10-CM

## 2023-08-29 PROCEDURE — 97014 ELECTRIC STIMULATION THERAPY: CPT

## 2023-08-29 PROCEDURE — 97110 THERAPEUTIC EXERCISES: CPT

## 2023-08-29 NOTE — PROGRESS NOTES
"  [facility]   Physical Therapy Treatment Note    Name: Mary Johansen  Clinic Number: [id number]     Therapy Diagnosis:        Encounter Diagnosis   Name Primary?    S/P arthroscopy of right shoulder          Physician: Jg Driscoll MD    Visit Date: 7/28/2023     Physician Orders: PT Eval and Treat   Medical Diagnosis from Referral: s/p arthroscopy of right shoulder  Evaluation Date: 7/26/2023  Authorization Period Expiration: 7/23/2024  Plan of Care Expiration: 9/8/2023  Progress Note Due: 9/8/2023  Visit # / Visits authorized: 10/12      Precautions: Standard      Time In: 9:30  Time Out:10:25  Total Billable Time:  55 minutes    PTA Visit #: 0/5   Subjective   Pt reports: "I am doing good today."     She was compliant with home exercise program.  Response to previous treatment: soreness  Functional change: decreased use of R UE sling per MD orders     Pain: 0/10  Location: right shoulder      Objective      Objective Measures updated at progress report unless specified.     Treatment   Mary received the treatments listed below:       therapeutic exercises to develop strength, endurance, ROM, flexibility, posture, and core stabilization including:     Ther-Ex  Reps   Pulleys 4 minutes (flexion and ABD)    Pendulums  30 x each   Ball Rolls 10 x 10" * each    Finger Ladder     Scap retractions 30 x 3"   Shoulder Shrugs 3 x 10 *   Bent over rows 2 x 10 *   Upper Trap Stretch 3 x 30" R side only    Hand Gripper  30 x Green    Wrist flexion/extension 30 x    Radial/ulnar deviation 30 x    Pronation/supination 30 x          Cane flexion stretch  5 x 10"    Cane ER stretch  5 x 10"    Sleeper stretch           Ther-Act     I,Y,T's     Prone Rows     Prone Abduction     Prone Extension               Passive range of motion: NOT COMPLETED TODAY Flexion: 91 degrees  Abduction: NT  ER: 25 degrees  IR: 54 degrees     supervised modalities after being cleared for contradictions: IFC " Electrical Stimulation:  Mary received IFC Electrical Stimulation for pain control applied to the R shoulder. Pt received stimulation for 10 minutes. Mary tolderated treatment well without any adverse effects.       cold pack for 10 minutes with estim.  Patient Education and Home Exercises     Written Home Exercises Provided: Patient instructed to cont prior HEP. Exercises were reviewed and Mary was able to demonstrate them prior to the end of the session.  Mary demonstrated good  understanding of the education provided. See EMR under Patient Instructions for exercises provided during therapy sessions    Assessment   Mary is a 52 y.o. female referred to outpatient Physical Therapy with a medical diagnosis of R rotator cuff repair. PT provided patient with proper setup on pulleys to increased R shoulder flexion ROM. PT also initiated R shoulder ABD with pulleys to increase ROM. Patient required verbal and visual cues while completing ABD on pulleys to decrease R shoulder hiking compensations. Patient required minimal cueing during bent over rows to increase scapular stabilization. PT initiated supine wand flexion and ER stretches to increase ROM due to patient with increased guarding during passive range of motion. Patient  demonstrated increased active assisted range of motion compared to previous Passive range of motion measurements. Patient had no reports of pain only muscle fatigue in R UE post exercises.     Mary Is progressing well towards her goals.   Pt prognosis is Good.     Pt will continue to benefit from skilled outpatient physical therapy to address the deficits listed in the problem list box on initial evaluation, provide pt/family education and to maximize pt's level of independence in the home and community environment.     Pt's spiritual, cultural and educational needs considered and pt agreeable to plan of care and goals.     Anticipated barriers to physical therapy:   work tasks, compliance with Home exercise program     Goals:  Short Term Goals: 3 weeks   Pt will increase R shoulder flexion to 120 degrees, external rotation to C4, and internal rotation to L2 for independent dressing  Pt will increase R upper extremity to 3/5 to allow patient to perform activities of daily living independently  Pt will report decrease in pain to 5/10 to improve quality of life     Long Term Goals: 6 weeks   Pt will increase R shoulder flexion to 155 degrees, external rotation to C4, and internal rotation to L2 for independent dressing  Pt will increase R upper extremity to 4/5 to allow patient to perform activities of daily living independently  Pt will report decrease in pain to 3/10 to improve quality of life  Pt will place 5 pound object in overhead shelf without hike and with less than or equal to 4/10 pain to allow patient to return to prior level of function    Plan      Plan of care Certification: 7/26/2023 to 9/8/2023.     Outpatient Physical Therapy 2 times weekly for 6 weeks to include the following interventions: Electrical Stimulation IFC/Biphasic, Manual Therapy, Moist Heat/ Ice, Neuromuscular Re-ed, Patient Education, Self Care, Therapeutic Activities, Therapeutic Exercise, and Ultrasound.   Alfredo Christine, PT, DPT     8/29/2023

## 2023-08-31 ENCOUNTER — TELEPHONE (OUTPATIENT)
Dept: ORTHOPEDICS | Facility: CLINIC | Age: 53
End: 2023-08-31
Payer: COMMERCIAL

## 2023-08-31 ENCOUNTER — CLINICAL SUPPORT (OUTPATIENT)
Dept: REHABILITATION | Facility: HOSPITAL | Age: 53
End: 2023-08-31
Payer: COMMERCIAL

## 2023-08-31 DIAGNOSIS — R52 PAIN: Primary | ICD-10-CM

## 2023-08-31 PROCEDURE — 97140 MANUAL THERAPY 1/> REGIONS: CPT

## 2023-08-31 PROCEDURE — 97014 ELECTRIC STIMULATION THERAPY: CPT

## 2023-08-31 PROCEDURE — 97110 THERAPEUTIC EXERCISES: CPT

## 2023-08-31 RX ORDER — CYCLOBENZAPRINE HCL 10 MG
10 TABLET ORAL 3 TIMES DAILY PRN
Qty: 60 TABLET | Refills: 3 | Status: SHIPPED | OUTPATIENT
Start: 2023-08-31 | End: 2023-08-31

## 2023-08-31 RX ORDER — CYCLOBENZAPRINE HCL 10 MG
10 TABLET ORAL 3 TIMES DAILY PRN
Qty: 60 TABLET | Refills: 3 | Status: SHIPPED | OUTPATIENT
Start: 2023-08-31 | End: 2023-09-10

## 2023-08-31 NOTE — PLAN OF CARE
"[facility]  Physical Therapy Plan of Care Note     Name: Mary Johansen  Clinic Number: [id number]    Therapy Diagnosis:   Encounter Diagnosis   Name Primary?    Pain Yes     Physician: Jg Driscoll MD    Visit Date: 8/31/2023       Physician Orders: PT Eval and Treat   Medical Diagnosis from Referral: s/p arthroscopy of right shoulder  Evaluation Date: 7/26/2023  Authorization Period Expiration: 7/23/2024  Plan of Care Expiration: 10/13/2023  Progress Note Due: 10/13/2023  Visit # / Visits authorized: 12/24 (hard max of 29)    Precautions: Standard  Functional Level Prior to Evaluation:  active, independent     Subjective     Update: "I am hurting from these muscle spasms."     Objective      Update:   Flexion: 91 degrees  Abduction: NT  ER: 25 degrees  IR: 54 degrees    Assessment     Update: Continued range of motion and strengthening as permitted per MD protocol and patient tolerance    Previous Short Term Goals Status:  in progress  New Short Term Goals Status:   in progress  Long Term Goal Status: continue per initial plan of care.  Reasons for Recertification of Therapy:   Continued range of motion and strengthening as permitted per MD protocol and patient tolerance    GOALS  Short Term Goals: 3 weeks   Pt will increase R shoulder flexion to 120 degrees, external rotation to C4, and internal rotation to L2 for independent dressing  Pt will increase R upper extremity to 3/5 to allow patient to perform activities of daily living independently  Pt will report decrease in pain to 5/10 to improve quality of life     Long Term Goals: 6 weeks   Pt will increase R shoulder flexion to 155 degrees, external rotation to C4, and internal rotation to L2 for independent dressing  Pt will increase R upper extremity to 4/5 to allow patient to perform activities of daily living independently  Pt will report decrease in pain to 3/10 to improve quality of life  Pt will place 5 pound object in " overhead shelf without hike and with less than or equal to 4/10 pain to allow patient to return to prior level of function      Plan     Updated Certification Period: 8/31/2023 to 10/31/2023   Recommended Treatment Plan: 2 times per week for 6 weeks:  Electrical Stimulation IFC/Biphasic/Premod, Manual Therapy, Moist Heat/ Ice, Neuromuscular Re-ed, Patient Education, Self Care, Therapeutic Activities, Therapeutic Exercise, and Ultrasound  Other Recommendations: N/A    Analy Camargo, PT, DPT 8/31/2023

## 2023-08-31 NOTE — PROGRESS NOTES
"  [facility]   Physical Therapy Treatment Note    Name: Mary Johansen  Clinic Number: [id number]     Therapy Diagnosis:        Encounter Diagnosis   Name Primary?    S/P arthroscopy of right shoulder          Physician: Jg Driscoll MD    Visit Date: 7/28/2023     Physician Orders: PT Eval and Treat   Medical Diagnosis from Referral: s/p arthroscopy of right shoulder  Evaluation Date: 7/26/2023  Authorization Period Expiration: 7/23/2024  Plan of Care Expiration: 10/13/2023  Progress Note Due: 10/13/2023  Visit # / Visits authorized: 12/24 (hard max of 29)     Precautions: Standard      Time In: 9:40  Time Out:10:25  Total Billable Time:  55 minutes    PTA Visit #: 0/5   Subjective   Pt reports: "I am hurting from these muscle spasms."     She was compliant with home exercise program.  Response to previous treatment: soreness  Functional change: decreased use of R UE sling per MD orders     Pain: 0/10  Location: right shoulder      Objective      Objective Measures updated at progress report unless specified.     Treatment   Mary received the treatments listed below:       therapeutic exercises to develop strength, endurance, ROM, flexibility, posture, and core stabilization including:     Ther-Ex  Reps   Pulleys 4 minutes (flexion and ABD)    Pendulums  30 x each   Ball Rolls 10 x 10" * each    Finger Ladder     Scap retractions 30 x 3"   Shoulder Shrugs 3 x 10 (not performed)   Bent over rows 2 x 10 (not performed)   Upper Trap Stretch 3 x 30" R side only    Hand Gripper  30 x Green    Wrist flexion/extension 30 x 1# *   Radial/ulnar deviation 30 x 1# *   Pronation/supination 30 x 1# *         Cane flexion stretch  5 x 10" (not completed)   Cane ER stretch  5 x 10" (not completed)   Sleeper stretch           Ther-Act     I,Y,T's     Prone Rows     Prone Abduction     Prone Extension               Passive range of motion: COMPLETED TODAY Flexion: 91 degrees  Abduction: " NT  ER: 25 degrees  IR: 54 degrees     supervised modalities after being cleared for contradictions: Biphasic Electrical Stimulation:  Mary received biphasic Electrical Stimulation for pain control applied to the R shoulder. Pt received stimulation for 10 minutes. Mary tolderated treatment well without any adverse effects.       hot pack for 10 minutes with estim.  Patient Education and Home Exercises     Written Home Exercises Provided: Patient instructed to cont prior HEP. Exercises were reviewed and Mary was able to demonstrate them prior to the end of the session.  Mary demonstrated good  understanding of the education provided. See EMR under Patient Instructions for exercises provided during therapy sessions    Assessment   Mary is a 52 y.o. female referred to outpatient Physical Therapy with a medical diagnosis of R rotator cuff repair. PT provided patient with proper setup on pulleys to increased R shoulder ROM. Patient required verbal and visual cues while completing ROM on pulleys to decrease R shoulder hiking compensations. Patient with increased pain today due to R bicep muscle tightness and spasms so some exercises and stretches not performed. PT did add resistance with wrist exercises without pain reported. PT performed passive range of motion with scapular mobs with flexion. Patient continues to be limited with flexion and ER. PT applied biphasic estim and heat to decrease muscle tightness and improve pain. PT also educated patient on sleep positions and pillow usage to improve UE position. No adverse effects noted.     Mary Is progressing well towards her goals.   Pt prognosis is Good.     Pt will continue to benefit from skilled outpatient physical therapy to address the deficits listed in the problem list box on initial evaluation, provide pt/family education and to maximize pt's level of independence in the home and community environment.     Pt's spiritual, cultural  and educational needs considered and pt agreeable to plan of care and goals.     Anticipated barriers to physical therapy:  work tasks, compliance with Home exercise program     Goals:  Short Term Goals: 3 weeks   Pt will increase R shoulder flexion to 120 degrees, external rotation to C4, and internal rotation to L2 for independent dressing  Pt will increase R upper extremity to 3/5 to allow patient to perform activities of daily living independently  Pt will report decrease in pain to 5/10 to improve quality of life     Long Term Goals: 6 weeks   Pt will increase R shoulder flexion to 155 degrees, external rotation to C4, and internal rotation to L2 for independent dressing  Pt will increase R upper extremity to 4/5 to allow patient to perform activities of daily living independently  Pt will report decrease in pain to 3/10 to improve quality of life  Pt will place 5 pound object in overhead shelf without hike and with less than or equal to 4/10 pain to allow patient to return to prior level of function    Plan      Plan of care Certification: 7/26/2023 to 9/8/2023.     Outpatient Physical Therapy 2 times weekly for 6 weeks to include the following interventions: Electrical Stimulation IFC/Biphasic, Manual Therapy, Moist Heat/ Ice, Neuromuscular Re-ed, Patient Education, Self Care, Therapeutic Activities, Therapeutic Exercise, and Ultrasound.     Analy Camargo, PT, DPT     8/31/2023

## 2023-08-31 NOTE — TELEPHONE ENCOUNTER
Meds sent; patient notified    ----- Message from Bia Vera sent at 8/31/2023  9:19 AM CDT -----  Regarding: medication  Patient call to see if you could call her something in for muscle spasm to [facility] in [facility], call back number is [phone]

## 2023-09-06 ENCOUNTER — CLINICAL SUPPORT (OUTPATIENT)
Dept: REHABILITATION | Facility: HOSPITAL | Age: 53
End: 2023-09-06
Payer: COMMERCIAL

## 2023-09-06 DIAGNOSIS — R52 PAIN: Primary | ICD-10-CM

## 2023-09-06 PROCEDURE — 97014 ELECTRIC STIMULATION THERAPY: CPT | Mod: CQ

## 2023-09-06 PROCEDURE — 97110 THERAPEUTIC EXERCISES: CPT | Mod: CQ

## 2023-09-06 NOTE — PROGRESS NOTES
"  [facility]   Physical Therapy Treatment Note    Name: Mary Johansen  Clinic Number: [id number]     Therapy Diagnosis:        Encounter Diagnosis   Name Primary?    S/P arthroscopy of right shoulder          Physician: Jg Driscoll MD    Visit Date: 7/28/2023     Physician Orders: PT Eval and Treat   Medical Diagnosis from Referral: s/p arthroscopy of right shoulder  Evaluation Date: 7/26/2023  Authorization Period Expiration: 7/23/2024  Plan of Care Expiration: 10/13/2023  Progress Note Due: 10/13/2023  Visit # / Visits authorized: 13/24 (hard max of 29)     Precautions: Standard      Time In: 8:47  (office staff later check in time)  Time Out: 9:29  Total Billable Time:  42 minutes     PTA Visit #: 1/5  Subjective   Pt reports: "My Left arm is hurting so bad.  I think from overusing it.  I am going to try to work today.  I have started trying to use my right arm a little bit but not to lift anything heavy or do any pulling".     She was compliant with home exercise program.  Response to previous treatment: soreness  Functional change: decreased use of R UE sling per MD orders     Pain: 0/10  Location: right shoulder      Objective      Objective Measures updated at progress report unless specified.     Treatment   Mary received the treatments listed below:       therapeutic exercises to develop strength, endurance, ROM, flexibility, posture, and core stabilization including:     Ther-Ex  Reps   Pulleys 4 minutes (flexion and ABD)    Pendulums  30 x each   Ball Rolls 10 x 10" * each    Finger Ladder     Scap retractions 30 x 3"   Shoulder Shrugs 3 x 10 (not performed)   Bent over rows 2 x 10    Upper Trap Stretch 3 x 30" R side only    Hand Gripper  20 x Green *    Wrist flexion/extension 30 x 1# *   Radial/ulnar deviation 30 x 1# *   Pronation/supination 30 x 1# *         Cane flexion stretch  5 x 10" (not completed)   Cane ER stretch  5 x 10" (not completed)   Sleeper " stretch           Ther-Act     I,Y,T's     Prone Rows     Prone Abduction     Prone Extension               Passive range of motion: COMPLETED TODAY Flexion: 91 degrees  Abduction: NT  ER: 25 degrees  IR: 54 degrees     supervised modalities after being cleared for contradictions: Biphasic Electrical Stimulation:  Mary received biphasic Electrical Stimulation for pain control applied to the R shoulder. Pt received stimulation for 10 minutes. Mary tolderated treatment well without any adverse effects.       hot pack for 10 minutes with estim.  Patient Education and Home Exercises     Written Home Exercises Provided: Patient instructed to cont prior HEP. Exercises were reviewed and Mary was able to demonstrate them prior to the end of the session.  Mary demonstrated good  understanding of the education provided. See EMR under Patient Instructions for exercises provided during therapy sessions    Assessment   Mary is a 52 y.o. female referred to outpatient Physical Therapy with a medical diagnosis of R rotator cuff repair. PT provided patient with proper setup on pulleys to increased R shoulder ROM. Patient required verbal and visual cues while completing ROM on pulleys to decrease R shoulder hiking compensations. Patient requires min verbal and visual cues to decrease compensation and substitution by shoulder hiking during completion of Therapeutic exercises.  Patient initially demonstrates mod muscle guarding during Passive range of motion/manual therapy, but is able to relax after oscillations to GH joint.  Patient without reports of increased pain in Right shoulder at end of physical therapy treatment session.     Mary Is progressing well towards her goals.   Pt prognosis is Good.     Pt will continue to benefit from skilled outpatient physical therapy to address the deficits listed in the problem list box on initial evaluation, provide pt/family education and to maximize pt's level of  independence in the home and community environment.     Pt's spiritual, cultural and educational needs considered and pt agreeable to plan of care and goals.     Anticipated barriers to physical therapy:  work tasks, compliance with Home exercise program     Goals:  Short Term Goals: 3 weeks   Pt will increase R shoulder flexion to 120 degrees, external rotation to C4, and internal rotation to L2 for independent dressing  Pt will increase R upper extremity to 3/5 to allow patient to perform activities of daily living independently  Pt will report decrease in pain to 5/10 to improve quality of life     Long Term Goals: 6 weeks   Pt will increase R shoulder flexion to 155 degrees, external rotation to C4, and internal rotation to L2 for independent dressing  Pt will increase R upper extremity to 4/5 to allow patient to perform activities of daily living independently  Pt will report decrease in pain to 3/10 to improve quality of life  Pt will place 5 pound object in overhead shelf without hike and with less than or equal to 4/10 pain to allow patient to return to prior level of function    Plan      Plan of care Certification: 7/26/2023 to 9/8/2023.     Outpatient Physical Therapy 2 times weekly for 6 weeks to include the following interventions: Electrical Stimulation IFC/Biphasic, Manual Therapy, Moist Heat/ Ice, Neuromuscular Re-ed, Patient Education, Self Care, Therapeutic Activities, Therapeutic Exercise, and Ultrasound.     GRZEGORZ Breen     9/6/2023

## 2023-09-08 ENCOUNTER — CLINICAL SUPPORT (OUTPATIENT)
Dept: REHABILITATION | Facility: HOSPITAL | Age: 53
End: 2023-09-08
Payer: COMMERCIAL

## 2023-09-08 DIAGNOSIS — R52 PAIN: Primary | ICD-10-CM

## 2023-09-08 PROCEDURE — 97014 ELECTRIC STIMULATION THERAPY: CPT | Mod: CQ

## 2023-09-08 PROCEDURE — 97110 THERAPEUTIC EXERCISES: CPT | Mod: CQ

## 2023-09-08 NOTE — PROGRESS NOTES
"  [facility]   Physical Therapy Treatment Note    Name: Mary Johansen  Clinic Number: [id number]     Therapy Diagnosis:        Encounter Diagnosis   Name Primary?    S/P arthroscopy of right shoulder          Physician: Jg Driscoll MD    Visit Date: 7/28/2023     Physician Orders: PT Eval and Treat   Medical Diagnosis from Referral: s/p arthroscopy of right shoulder  Evaluation Date: 7/26/2023  Authorization Period Expiration: 7/23/2024  Plan of Care Expiration: 10/13/2023  Progress Note Due: 10/13/2023  Visit # / Visits authorized: 14/24 (hard max of 29)     Precautions: Standard      Time In: 8:13  Time Out:9:00  Total Billable Time:  45 total, 30 billable     PTA Visit #: 2/5  Subjective   Pt reports: "I'm hurting today.  I worked yesterday.  I didn't  anything heavy, but I used it more than I usually do."    She was compliant with home exercise program.  Response to previous treatment: soreness  Functional change: decreased use of R UE sling per MD orders     Pain: 5/10  Location: right shoulder      Objective      Objective Measures updated at progress report unless specified.     Treatment   Mary received the treatments listed below:       therapeutic exercises to develop strength, endurance, ROM, flexibility, posture, and core stabilization.  Added finger ladder. Increased reps of hand gripper and bent over rows.      Ther-Ex  Reps   Pulleys 4 minutes (flexion and ABD)    Pendulums  30 x each   Ball Rolls 10 x 10" * each    Finger Ladder 6 x 5" *    Scap retractions 30 x 3"   Shoulder Shrugs 3 x 10 (not performed)   Bent over rows 3 x 10    Upper Trap Stretch 3 x 30" R side only    Hand Gripper  30 x Green *    Wrist flexion/extension 30 x 1#    Radial/ulnar deviation 30 x 1#    Pronation/supination 30 x 1#          Cane flexion stretch  5 x 10" (not completed)   Cane ER stretch  5 x 10" (not completed)   Sleeper stretch           Ther-Act     I,Y,T's     Prone " Rows     Prone Abduction     Prone Extension               Passive range of motion: COMPLETED TODAY Flexion: 91 degrees  Abduction: NT  ER: 25 degrees  IR: 54 degrees     supervised modalities after being cleared for contradictions: Biphasic Electrical Stimulation:  Mary received biphasic Electrical Stimulation for pain control applied to the R shoulder. Pt received stimulation for 10 minutes. Mary tolderated treatment well without any adverse effects.       hot pack for 10 minutes with estim.  Patient Education and Home Exercises     Written Home Exercises Provided: Patient instructed to cont prior HEP. Exercises were reviewed and Mary was able to demonstrate them prior to the end of the session.  Mary demonstrated good  understanding of the education provided. See EMR under Patient Instructions for exercises provided during therapy sessions    Assessment   Mary is a 52 y.o. female referred to outpatient Physical Therapy with a medical diagnosis of R rotator cuff repair. PT provided patient with proper setup on pulleys to increased R shoulder ROM. Patient required verbal and visual cues while completing ROM on pulleys to decrease R shoulder hiking compensations. Patient continues to require min verbal and visual cues to decrease compensation and substitution by shoulder hiking during completion of Therapeutic exercises.  Patient initially demonstrates mod muscle guarding during Passive range of motion/manual therapy, but is able to relax after oscillations to GH joint.  Patient without reports of increased pain in Right shoulder at end of physical therapy treatment session.     Mary Is progressing well towards her goals.   Pt prognosis is Good.     Pt will continue to benefit from skilled outpatient physical therapy to address the deficits listed in the problem list box on initial evaluation, provide pt/family education and to maximize pt's level of independence in the home and  community environment.     Pt's spiritual, cultural and educational needs considered and pt agreeable to plan of care and goals.     Anticipated barriers to physical therapy:  work tasks, compliance with Home exercise program     Goals:  Short Term Goals: 3 weeks   Pt will increase R shoulder flexion to 120 degrees, external rotation to C4, and internal rotation to L2 for independent dressing  Pt will increase R upper extremity to 3/5 to allow patient to perform activities of daily living independently  Pt will report decrease in pain to 5/10 to improve quality of life     Long Term Goals: 6 weeks   Pt will increase R shoulder flexion to 155 degrees, external rotation to C4, and internal rotation to L2 for independent dressing  Pt will increase R upper extremity to 4/5 to allow patient to perform activities of daily living independently  Pt will report decrease in pain to 3/10 to improve quality of life  Pt will place 5 pound object in overhead shelf without hike and with less than or equal to 4/10 pain to allow patient to return to prior level of function    Plan      Plan of care Certification: 7/26/2023 to 9/8/2023.     Outpatient Physical Therapy 2 times weekly for 6 weeks to include the following interventions: Electrical Stimulation IFC/Biphasic, Manual Therapy, Moist Heat/ Ice, Neuromuscular Re-ed, Patient Education, Self Care, Therapeutic Activities, Therapeutic Exercise, and Ultrasound.     GRZEGORZ Breen     9/8/2023

## 2023-09-13 ENCOUNTER — CLINICAL SUPPORT (OUTPATIENT)
Dept: REHABILITATION | Facility: HOSPITAL | Age: 53
End: 2023-09-13
Payer: COMMERCIAL

## 2023-09-13 DIAGNOSIS — R52 PAIN: Primary | ICD-10-CM

## 2023-09-13 PROCEDURE — 97014 ELECTRIC STIMULATION THERAPY: CPT

## 2023-09-13 PROCEDURE — 97110 THERAPEUTIC EXERCISES: CPT

## 2023-09-13 NOTE — PROGRESS NOTES
"  [facility]   Physical Therapy Treatment Note    Name: Mary Johansen  Clinic Number: [id number]     Therapy Diagnosis:        Encounter Diagnosis   Name Primary?    S/P arthroscopy of right shoulder          Physician: Jg Driscoll MD    Visit Date: 7/28/2023     Physician Orders: PT Eval and Treat   Medical Diagnosis from Referral: s/p arthroscopy of right shoulder  Evaluation Date: 7/26/2023  Authorization Period Expiration: 7/23/2024  Plan of Care Expiration: 10/13/2023  Progress Note Due: 10/13/2023  Visit # / Visits authorized: 15/24 (hard max of 29)     Precautions: Standard      Time In: 9:40  Time Out:10:33  Total Billable Time:  53 minutes billable     PTA Visit #: 0/5  Subjective   Pt reports: "I'm hurting. I think I have overdone it recently. I still cannot sleep at night because of the pain down my arm."    She was compliant with home exercise program.  Response to previous treatment: soreness  Functional change: decreased use of R UE sling per MD orders     Pain: 5/10  Location: right shoulder      Objective      Objective Measures updated at progress report unless specified.     Treatment   Mary received the treatments listed below:       therapeutic exercises to develop strength, endurance, ROM, flexibility, posture, and core stabilization.  Added finger ladder. Increased reps of hand gripper and bent over rows.      Ther-Ex  Reps   Pulleys 4 minutes (flexion and ABD)    Pendulums  30 x each   Ball Rolls 10 x 10" * each    Finger Ladder 6 x 5" *    Scap retractions 30 x 3"   Bent over rows 3 x 10    Upper Trap Stretch 3 x 30" R side only    Hand Gripper  30 x Green *    Wrist flexion/extension 30 x 1#    Radial/ulnar deviation 30 x 1#    Pronation/supination 30 x 1#          Cane flexion stretch  5 x 10"    Cane ER stretch  5 x 10"    Sleeper stretch           Ther-Act     I,Y,T's     Prone Rows     Prone Abduction     Prone Extension               Passive " range of motion:NOT COMPLETED TODAY Flexion: 91 degrees  Abduction: NT  ER: 25 degrees  IR: 54 degrees     supervised modalities after being cleared for contradictions: Biphasic Electrical Stimulation:  Mary received biphasic Electrical Stimulation for pain control applied to the R shoulder. Pt received stimulation for 10 minutes. Mary tolderated treatment well without any adverse effects.       Ice pack for 10 minutes with estim.  Patient Education and Home Exercises     Written Home Exercises Provided: Patient instructed to cont prior HEP. Exercises were reviewed and Mary was able to demonstrate them prior to the end of the session.  Mary demonstrated good  understanding of the education provided. See EMR under Patient Instructions for exercises provided during therapy sessions    Assessment   Mary is a 52 y.o. female referred to outpatient Physical Therapy with a medical diagnosis of R rotator cuff repair. PT provided patient with proper setup on pulleys to increased R shoulder ROM. Patient required verbal and visual cues while completing ROM on pulleys to decrease R shoulder hiking compensations. PT provided with visual and occasional verbal cueing throughout session to decrease guarding and increase scapular stability with exercises. Patient had no reports of increased pain or adverse effects to treatment. She continued to report R shoulder pain after exercise that decreased post modalities.     Mary Is progressing well towards her goals.   Pt prognosis is Good.     Pt will continue to benefit from skilled outpatient physical therapy to address the deficits listed in the problem list box on initial evaluation, provide pt/family education and to maximize pt's level of independence in the home and community environment.     Pt's spiritual, cultural and educational needs considered and pt agreeable to plan of care and goals.     Anticipated barriers to physical therapy:  work tasks,  compliance with Home exercise program     Goals:  Short Term Goals: 3 weeks   Pt will increase R shoulder flexion to 120 degrees, external rotation to C4, and internal rotation to L2 for independent dressing  Pt will increase R upper extremity to 3/5 to allow patient to perform activities of daily living independently  Pt will report decrease in pain to 5/10 to improve quality of life     Long Term Goals: 6 weeks   Pt will increase R shoulder flexion to 155 degrees, external rotation to C4, and internal rotation to L2 for independent dressing  Pt will increase R upper extremity to 4/5 to allow patient to perform activities of daily living independently  Pt will report decrease in pain to 3/10 to improve quality of life  Pt will place 5 pound object in overhead shelf without hike and with less than or equal to 4/10 pain to allow patient to return to prior level of function    Plan      Plan of care Certification: 7/26/2023 to 9/8/2023.     Outpatient Physical Therapy 2 times weekly for 6 weeks to include the following interventions: Electrical Stimulation IFC/Biphasic, Manual Therapy, Moist Heat/ Ice, Neuromuscular Re-ed, Patient Education, Self Care, Therapeutic Activities, Therapeutic Exercise, and Ultrasound.     Alfredo Christine, PT, DPT     9/13/2023

## 2023-09-18 ENCOUNTER — CLINICAL SUPPORT (OUTPATIENT)
Dept: REHABILITATION | Facility: HOSPITAL | Age: 53
End: 2023-09-18
Payer: COMMERCIAL

## 2023-09-18 DIAGNOSIS — R52 PAIN: Primary | ICD-10-CM

## 2023-09-18 PROCEDURE — 97014 ELECTRIC STIMULATION THERAPY: CPT | Mod: CQ

## 2023-09-18 PROCEDURE — 97110 THERAPEUTIC EXERCISES: CPT | Mod: CQ

## 2023-09-18 NOTE — PROGRESS NOTES
"  [facility]   Physical Therapy Treatment Note    Name: Mary Garcia Eleno  Clinic Number: [id number]     Therapy Diagnosis:        Encounter Diagnosis   Name Primary?    S/P arthroscopy of right shoulder          Physician: Jg Driscoll MD    Visit Date: 7/28/2023     Physician Orders: PT Eval and Treat   Medical Diagnosis from Referral: s/p arthroscopy of right shoulder  Evaluation Date: 7/26/2023  Authorization Period Expiration: 7/23/2024  Plan of Care Expiration: 10/13/2023  Progress Note Due: 10/13/2023  Visit # / Visits authorized: 16/24 (hard max of 29)     Precautions: Standard      Time In: 9:30  Time Out:10:16  Total Billable Time: 45 minutes     PTA Visit #: 1/5  Subjective   Pt reports:  "Both my arms are hurting me so bad at times".  Patient states "I have a doctor's appointment when I leave here to see if they can do something for me".         She was compliant with home exercise program.  Response to previous treatment: soreness  Functional change: decreased use of R UE sling per MD orders     Pain: 6/10  Location: bilateral shoulders     Objective      Objective Measures updated at progress report unless specified.     Treatment   Mary received the treatments listed below:       therapeutic exercises to develop strength, endurance, ROM, flexibility, posture, and core stabilization.  Added Thera Band exercises      Ther-Ex  Reps   Pulleys 4 minutes (flexion and ABD)    Pendulums  30 x each   Ball Rolls 10 x 10" * each    Finger Ladder 6 x 5" *    Scap retractions 30 x 3"   Rows with Thera Band  2 x 10, Green *    Shoulder extension with Thera Band  2 x 10, Green *    Shoulder IR/ER with Thera Band  2 x 10, Green *    Bent over rows 3 x 10    Upper Trap Stretch 3 x 30" R side only    Hand Gripper  30 x Green    Wrist flexion/extension 30 x 1#    Radial/ulnar deviation 30 x 1#    Pronation/supination 30 x 1#          Cane flexion stretch  5 x 10"    Cane ER stretch  5 " "x 10"    Sleeper stretch                               Passive range of motion:NOT COMPLETED TODAY Flexion: 91 degrees  Abduction: NT  ER: 25 degrees  IR: 54 degrees         Therapeutic activities to improve static and dynamic functional performance.  See flow-sheet below.     Ther-Act     I,Y,T's     Prone Rows  2 x 10 *   Prone Abduction     Prone Extension  2 x 10 *                        Not completed today: supervised modalities after being cleared for contradictions: Biphasic Electrical Stimulation:  Mary received biphasic Electrical Stimulation for pain control applied to the R shoulder. Pt received stimulation for 10 minutes. Mary tolderated treatment well without any adverse effects.       Ice pack for 10 minutes with estim.  Patient Education and Home Exercises     Written Home Exercises Provided: Patient instructed to cont prior HEP. Exercises were reviewed and Mary was able to demonstrate them prior to the end of the session.  Mary demonstrated good  understanding of the education provided. See EMR under Patient Instructions for exercises provided during therapy sessions    Assessment   Mary is a 52 y.o. female referred to outpatient Physical Therapy with a medical diagnosis of R rotator cuff repair. PT provided patient with proper setup on pulleys to increased R shoulder ROM. Patient required verbal and visual cues while completing ROM on pulleys to decrease R shoulder hiking compensations. LPTA added Thera Band exercises and prone shoulder exercises as tolerated by patient with verbal and visual cues for proper alignment, speed of movement, count, and hold times.  Patient declined modalities application at end of physical therapy treatment session.  No adverse effects noted at end of physical therapy treatment session.       Mary Is progressing well towards her goals.   Pt prognosis is Good.     Pt will continue to benefit from skilled outpatient physical therapy to " address the deficits listed in the problem list box on initial evaluation, provide pt/family education and to maximize pt's level of independence in the home and community environment.     Pt's spiritual, cultural and educational needs considered and pt agreeable to plan of care and goals.     Anticipated barriers to physical therapy:  work tasks, compliance with Home exercise program     Goals:  Short Term Goals: 3 weeks   Pt will increase R shoulder flexion to 120 degrees, external rotation to C4, and internal rotation to L2 for independent dressing  Pt will increase R upper extremity to 3/5 to allow patient to perform activities of daily living independently  Pt will report decrease in pain to 5/10 to improve quality of life     Long Term Goals: 6 weeks   Pt will increase R shoulder flexion to 155 degrees, external rotation to C4, and internal rotation to L2 for independent dressing  Pt will increase R upper extremity to 4/5 to allow patient to perform activities of daily living independently  Pt will report decrease in pain to 3/10 to improve quality of life  Pt will place 5 pound object in overhead shelf without hike and with less than or equal to 4/10 pain to allow patient to return to prior level of function    Plan      Plan of care Certification: 7/26/2023 to 9/8/2023.     Outpatient Physical Therapy 2 times weekly for 6 weeks to include the following interventions: Electrical Stimulation IFC/Biphasic, Manual Therapy, Moist Heat/ Ice, Neuromuscular Re-ed, Patient Education, Self Care, Therapeutic Activities, Therapeutic Exercise, and Ultrasound.     Continue Plan of Care per PT order to progress patient toward rehab goals as tolerated by patient.   GRZEGORZ Breen      9/18/2023

## 2023-09-20 ENCOUNTER — CLINICAL SUPPORT (OUTPATIENT)
Dept: REHABILITATION | Facility: HOSPITAL | Age: 53
End: 2023-09-20
Payer: COMMERCIAL

## 2023-09-20 DIAGNOSIS — R52 PAIN: Primary | ICD-10-CM

## 2023-09-20 PROCEDURE — 97530 THERAPEUTIC ACTIVITIES: CPT

## 2023-09-20 PROCEDURE — 97014 ELECTRIC STIMULATION THERAPY: CPT

## 2023-09-20 PROCEDURE — 97110 THERAPEUTIC EXERCISES: CPT

## 2023-09-20 NOTE — PROGRESS NOTES
"  [facility]   Physical Therapy Treatment Note    Name: Mary Johansen  Clinic Number: [id number]     Therapy Diagnosis:        Encounter Diagnosis   Name Primary?    S/P arthroscopy of right shoulder          Physician: Jg Driscoll MD    Visit Date: 7/28/2023     Physician Orders: PT Eval and Treat   Medical Diagnosis from Referral: s/p arthroscopy of right shoulder  Evaluation Date: 7/26/2023  Authorization Period Expiration: 7/23/2024  Plan of Care Expiration: 10/13/2023  Progress Note Due: 10/13/2023  Visit # / Visits authorized: 17/24 (hard max of 29)     Precautions: Standard      Time In: 9:35 (later check in time due to office staff begin busy)   Time Out: 10:40  Total Billable Time:  65 minutes     PTA Visit #: 0/5  Subjective   Pt reports:  "I went to get some toradol the other day and that has helped some.I have been vacuuming the truck out and all kind of stuff this morning."     She was compliant with home exercise program.  Response to previous treatment: soreness  Functional change: decreased use of R UE sling per MD orders     Pain: 2/10  Location: bilateral shoulders     Objective      Objective Measures updated at progress report unless specified.     Treatment   Mary received the treatments listed below:       therapeutic exercises to develop strength, endurance, ROM, flexibility, posture, and core stabilization.  Added Thera Band exercises      Ther-Ex  Reps   Pulleys 4 minutes (flexion and ABD)    Pendulums  30 x each   Ball Rolls 10 x 10" * each    Finger Ladder 6 x 5" *    Scap retractions 30 x 3"   Rows with Thera Band  2 x 10, Green    Shoulder extension with Thera Band  2 x 10, Green    Shoulder IR/ER with Thera Band  2 x 10, Green    Bent over rows 3 x 10    Upper Trap Stretch 3 x 30" R side only    Hand Gripper  30 x Green    Wrist flexion/extension 30 x 1#    Radial/ulnar deviation 30 x 1#    Pronation/supination 30 x 1#          Cane flexion " "stretch  5 x 10"    Cane ER stretch  5 x 10"    Sleeper stretch                               Passive range of motion:NOT COMPLETED TODAY Flexion: 91 degrees  Abduction: NT  ER: 25 degrees  IR: 54 degrees         Therapeutic activities to improve static and dynamic functional performance.  See flow-sheet below.     Ther-Act     I,Y,T's     Prone Rows  2 x 10    Prone Abduction     Prone Extension  2 x 10                        Supervised modalities after being cleared for contradictions: Biphasic Electrical Stimulation:  Mary received biphasic Electrical Stimulation for pain control applied to the R shoulder. Pt received stimulation for 10 minutes. Mary tolderated treatment well without any adverse effects.       Ice pack for 10 minutes with estim.  Patient Education and Home Exercises     Written Home Exercises Provided: Patient instructed to cont prior HEP. Exercises were reviewed and Mary was able to demonstrate them prior to the end of the session.  Mary demonstrated good  understanding of the education provided. See EMR under Patient Instructions for exercises provided during therapy sessions    Assessment   Mary is a 52 y.o. female referred to outpatient Physical Therapy with a medical diagnosis of R rotator cuff repair. PT provided patient with proper setup on pulleys to increased R shoulder ROM. Patient required verbal and visual cues while completing ROM on pulleys to decrease R shoulder hiking compensations. PT provided patient with proper setup on pulleys to increase flexion and ABD ROM prior to exercises. Patient required verbal cues throughout treatment to decrease speed and increase motor control to increase effectiveness of exercises. Patient continues to require maximal cues for decreased guarding during Passive range of motion. NO adverse effects noted to treatment.       Mary Is progressing well towards her goals.   Pt prognosis is Good.     Pt will continue to benefit " from skilled outpatient physical therapy to address the deficits listed in the problem list box on initial evaluation, provide pt/family education and to maximize pt's level of independence in the home and community environment.     Pt's spiritual, cultural and educational needs considered and pt agreeable to plan of care and goals.     Anticipated barriers to physical therapy:  work tasks, compliance with Home exercise program     Goals:  Short Term Goals: 3 weeks   Pt will increase R shoulder flexion to 120 degrees, external rotation to C4, and internal rotation to L2 for independent dressing  Pt will increase R upper extremity to 3/5 to allow patient to perform activities of daily living independently  Pt will report decrease in pain to 5/10 to improve quality of life     Long Term Goals: 6 weeks   Pt will increase R shoulder flexion to 155 degrees, external rotation to C4, and internal rotation to L2 for independent dressing  Pt will increase R upper extremity to 4/5 to allow patient to perform activities of daily living independently  Pt will report decrease in pain to 3/10 to improve quality of life  Pt will place 5 pound object in overhead shelf without hike and with less than or equal to 4/10 pain to allow patient to return to prior level of function    Plan      Plan of care Certification: 7/26/2023 to 9/8/2023.     Outpatient Physical Therapy 2 times weekly for 6 weeks to include the following interventions: Electrical Stimulation IFC/Biphasic, Manual Therapy, Moist Heat/ Ice, Neuromuscular Re-ed, Patient Education, Self Care, Therapeutic Activities, Therapeutic Exercise, and Ultrasound.     Continue Plan of Care per PT order to progress patient toward rehab goals as tolerated by patient.   Alfredo Christine, PT, DPT       9/20/2023

## 2023-09-21 ENCOUNTER — OFFICE VISIT (OUTPATIENT)
Dept: ORTHOPEDICS | Facility: CLINIC | Age: 53
End: 2023-09-21
Payer: COMMERCIAL

## 2023-09-21 DIAGNOSIS — Z98.890 S/P ARTHROSCOPY OF RIGHT SHOULDER: Primary | ICD-10-CM

## 2023-09-21 PROCEDURE — 99213 OFFICE O/P EST LOW 20 MIN: CPT | Mod: PBBFAC | Performed by: ORTHOPAEDIC SURGERY

## 2023-09-21 PROCEDURE — 4010F ACE/ARB THERAPY RXD/TAKEN: CPT | Mod: ,,, | Performed by: ORTHOPAEDIC SURGERY

## 2023-09-21 PROCEDURE — 99024 PR POST-OP FOLLOW-UP VISIT: ICD-10-PCS | Mod: ,,, | Performed by: ORTHOPAEDIC SURGERY

## 2023-09-21 PROCEDURE — 1159F MED LIST DOCD IN RCRD: CPT | Mod: ,,, | Performed by: ORTHOPAEDIC SURGERY

## 2023-09-21 PROCEDURE — 1159F PR MEDICATION LIST DOCUMENTED IN MEDICAL RECORD: ICD-10-PCS | Mod: ,,, | Performed by: ORTHOPAEDIC SURGERY

## 2023-09-21 PROCEDURE — 99024 POSTOP FOLLOW-UP VISIT: CPT | Mod: ,,, | Performed by: ORTHOPAEDIC SURGERY

## 2023-09-21 PROCEDURE — 4010F PR ACE/ARB THEARPY RXD/TAKEN: ICD-10-PCS | Mod: ,,, | Performed by: ORTHOPAEDIC SURGERY

## 2023-09-21 RX ORDER — ZOLPIDEM TARTRATE 5 MG/1
5 TABLET ORAL NIGHTLY PRN
Qty: 25 TABLET | Refills: 0 | Status: SHIPPED | OUTPATIENT
Start: 2023-09-21 | End: 2024-03-21

## 2023-09-21 NOTE — PROGRESS NOTES
Repair, Rotator Cuff, Arthroscopic - Right, Arthroscopy, Shoulder, With Subacromial Space Decompression - Right, Arthroscopy,shoulder,with Biceps Tenodesis - Right, and Arthroscopy, Shoulder, With Distal Clavicle Excision - Right 7/19/2023      Pt is here today for Third post-operative followup of her shoulder arthroscopy.      she is doing well.  She is continuing physical therapy at [facility].   She states she is having some pain.   She states the pain she is having could be because she is over working her shoulders.   She states she is having trouble sleeping at night.     We have reviewed her findings and discussed plan of care and future treatment options, including the physical therapy plan.                                                                                     PHYSICAL EXAMINATION:     Incision sites healed well. Sling is in appropriate position  No evidence of any erythema, infection or induration  Range of motion of the shoulder: 0-140 passive FE, 0-45 er  Biceps tone appears appropriate      IMAGING: no new imaging    No image results found.                                                                                   ASSESSMENT:                                                                                                                                               1. Status post shoulder arthroscopy, doing well.                                                                                                                               PLAN:                                                                                                                                                     1. Continue with PT  2. Emphasized scapular stabilization to improve overall function.  3. I have discussed return to activity in detail.  4. she will see us back in 4 weeks.                                      5. All questions were answered and she should contact us if she  has any questions or  concerns in the interim.              There are no Patient Instructions on file for this visit.

## 2023-09-25 ENCOUNTER — CLINICAL SUPPORT (OUTPATIENT)
Dept: REHABILITATION | Facility: HOSPITAL | Age: 53
End: 2023-09-25
Payer: COMMERCIAL

## 2023-09-25 DIAGNOSIS — R52 PAIN: Primary | ICD-10-CM

## 2023-09-25 PROCEDURE — 97110 THERAPEUTIC EXERCISES: CPT | Mod: CQ

## 2023-09-25 PROCEDURE — 97014 ELECTRIC STIMULATION THERAPY: CPT | Mod: CQ

## 2023-09-25 NOTE — PROGRESS NOTES
"  [facility]   Physical Therapy Treatment Note    Name: Mary Johansen  Clinic Number: [id number]     Therapy Diagnosis:        Encounter Diagnosis   Name Primary?    S/P arthroscopy of right shoulder          Physician: Jg Driscoll MD    Visit Date: 7/28/2023     Physician Orders: PT Eval and Treat   Medical Diagnosis from Referral: s/p arthroscopy of right shoulder  Evaluation Date: 7/26/2023  Authorization Period Expiration: 7/23/2024  Plan of Care Expiration: 10/13/2023  Progress Note Due: 10/13/2023  Visit # / Visits authorized: 18/24 (hard max of 29)     Precautions: Standard      Time In: 9:40  Time Out: 10:30   Total Billable Time: 40 MINUTES     PTA Visit #: 1/5  Subjective   Pt reports:  "My doctor said he may put an injection in my shoulder if the pain doesn't get better".     She was compliant with home exercise program.  Response to previous treatment: soreness  Functional change: sling d/c     Pain: 2/10  Location: bilateral shoulders     Objective      Objective Measures updated at progress report unless specified.     Treatment   Mary received the treatments listed below:       therapeutic exercises to develop strength, endurance, ROM, flexibility, posture, and core stabilization.       Ther-Ex  Reps   Pulleys 4 minutes (flexion and ABD)    Pendulums  30 x each   Ball Rolls 10 x 10" * each    Finger Ladder 6 x 5"    Scap retractions 30 x 3"   Rows with Thera Band  2 x 10, Green    Shoulder extension with Thera Band  2 x 10, Green    Shoulder IR/ER with Thera Band  2 x 10, Green    Bent over rows 3 x 10    Upper Trap Stretch 3 x 30" R side only    Hand Gripper  30 x Green    Wrist flexion/extension 30 x 1#    Radial/ulnar deviation 30 x 1#    Pronation/supination 30 x 1#          Cane flexion stretch  5 x 10"    Cane ER stretch  5 x 10"    Sleeper stretch                               Passive range of motion:NOT COMPLETED TODAY Flexion: 91 degrees  Abduction: " "NT  ER: 25 degrees  IR: 54 degrees         Therapeutic activities to improve static and dynamic functional performance.  See flow-sheet below.     Ther-Act     I,Y,T's     Prone Rows  2 x 10    Prone Abduction     Prone Extension  2 x 10                        Supervised modalities after being cleared for contradictions: Biphasic Electrical Stimulation:  Mary received biphasic Electrical Stimulation for pain control applied to the R shoulder. Pt received stimulation for 10 minutes. Mary tolderated treatment well without any adverse effects.       Ice pack for 10 minutes with estim.  Patient Education and Home Exercises     Written Home Exercises Provided: Patient instructed to cont prior HEP. Exercises were reviewed and Mary was able to demonstrate them prior to the end of the session.  Mary demonstrated good  understanding of the education provided. See EMR under Patient Instructions for exercises provided during therapy sessions    Assessment   Mary is a 52 y.o. female referred to outpatient Physical Therapy with a medical diagnosis of R rotator cuff repair. LPTA  provided patient with proper setup on pulleys to increased R shoulder ROM. Patient required verbal and visual cues while completing ROM on pulleys to decrease R shoulder hiking compensations. Patient required verbal cues throughout treatment to decrease speed and increase motor control to increase effectiveness of exercises.   Patient able to decrease muscle guarding during Passive range of motion and manual stretching with verbal cues and GH joint oscillations.  Patient states "I know it needs to be stretched.  My doctor said it is tight" regarding Right shoulder.  No adverse effects noted.     Mary Is progressing well towards her goals.   Pt prognosis is Good.     Pt will continue to benefit from skilled outpatient physical therapy to address the deficits listed in the problem list box on initial evaluation, provide " pt/family education and to maximize pt's level of independence in the home and community environment.     Pt's spiritual, cultural and educational needs considered and pt agreeable to plan of care and goals.     Anticipated barriers to physical therapy:  work tasks, compliance with Home exercise program     Goals:  Short Term Goals: 3 weeks   Pt will increase R shoulder flexion to 120 degrees, external rotation to C4, and internal rotation to L2 for independent dressing  Pt will increase R upper extremity to 3/5 to allow patient to perform activities of daily living independently  Pt will report decrease in pain to 5/10 to improve quality of life     Long Term Goals: 6 weeks   Pt will increase R shoulder flexion to 155 degrees, external rotation to C4, and internal rotation to L2 for independent dressing  Pt will increase R upper extremity to 4/5 to allow patient to perform activities of daily living independently  Pt will report decrease in pain to 3/10 to improve quality of life  Pt will place 5 pound object in overhead shelf without hike and with less than or equal to 4/10 pain to allow patient to return to prior level of function    Plan      Plan of care Certification: 7/26/2023 to 9/8/2023.     Outpatient Physical Therapy 2 times weekly for 6 weeks to include the following interventions: Electrical Stimulation IFC/Biphasic, Manual Therapy, Moist Heat/ Ice, Neuromuscular Re-ed, Patient Education, Self Care, Therapeutic Activities, Therapeutic Exercise, and Ultrasound.     Continue Plan of Care per PT order to progress patient toward rehab goals as tolerated by patient.   GRZEGORZ Breen   9/25/2023

## 2023-09-27 ENCOUNTER — CLINICAL SUPPORT (OUTPATIENT)
Dept: REHABILITATION | Facility: HOSPITAL | Age: 53
End: 2023-09-27
Payer: COMMERCIAL

## 2023-09-27 DIAGNOSIS — R52 PAIN: Primary | ICD-10-CM

## 2023-09-27 PROCEDURE — 97014 ELECTRIC STIMULATION THERAPY: CPT

## 2023-09-27 PROCEDURE — 97530 THERAPEUTIC ACTIVITIES: CPT

## 2023-09-27 PROCEDURE — 97110 THERAPEUTIC EXERCISES: CPT

## 2023-09-27 NOTE — PROGRESS NOTES
"  [facility]   Physical Therapy Treatment Note    Name: Mary Johansen  Clinic Number: [id number]     Therapy Diagnosis:        Encounter Diagnosis   Name Primary?    S/P arthroscopy of right shoulder          Physician: Jg Driscoll MD    Visit Date: 7/28/2023     Physician Orders: PT Eval and Treat   Medical Diagnosis from Referral: s/p arthroscopy of right shoulder  Evaluation Date: 7/26/2023  Authorization Period Expiration: 7/23/2024  Plan of Care Expiration: 10/13/2023  Progress Note Due: 10/13/2023  Visit # / Visits authorized: 19/24 (hard max of 29)     Precautions: Standard      Time In: 9:40  Time Out: 10:21  Total Billable Time: 41 MINUTES     PTA Visit #: 0/5  Subjective   Pt reports:  "I'm going to call my doctor today and ask for that injection."     She was compliant with home exercise program.  Response to previous treatment: soreness  Functional change: sling d/c     Pain: 2/10  Location: bilateral shoulders     Objective      Objective Measures updated at progress report unless specified.     Treatment   Mary received the treatments listed below:       therapeutic exercises to develop strength, endurance, ROM, flexibility, posture, and core stabilization.       Ther-Ex  Reps   Pulleys 4 minutes (flexion and ABD)    Pendulums  30 x each   Ball Rolls 10 x 10" each    Finger Ladder 5 x 8"    Scap retractions 30 x 3"   Rows with Thera Band  2 x 10, Green    Shoulder extension with Thera Band  2 x 10, Green    Shoulder IR/ER with Thera Band  2 x 10, Green    Upper Trap Stretch 3 x 30" R side only          Cane flexion stretch  5 x 10" (not completed)   Cane ER stretch  5 x 10" (not completed)   Sleeper stretch               Passive range of motion: Flexion: 91 degrees  Abduction: NT  ER: 25 degrees  IR: 54 degrees         Therapeutic activities to improve static and dynamic functional performance.  See flow-sheet below.     Ther-Act     I,Y,T's     Prone Rows  2 x " 10    Prone Abduction     Prone Extension  2 x 10    Prone Flexion 10 x                  Supervised modalities after being cleared for contradictions: Biphasic Electrical Stimulation:  Mary received biphasic Electrical Stimulation for pain control applied to the R shoulder biceps and deltoid. Pt received stimulation for 10 minutes. Mary tolerated treatment well without any adverse effects.       Moist hot pack for 10 minutes with estim.    Patient Education and Home Exercises     Written Home Exercises Provided: Patient instructed to cont prior HEP. Exercises were reviewed and Mary was able to demonstrate them prior to the end of the session.  Mray demonstrated good  understanding of the education provided. See EMR under Patient Instructions for exercises provided during therapy sessions    Assessment   Mary is a 52 y.o. female referred to outpatient Physical Therapy with a medical diagnosis of R rotator cuff repair. PT provided patient with proper setup on pulleys to increased R shoulder ROM. Patient required verbal and visual cues throughout treatment to decrease R shoulder hiking compensations. PT did not add exercises due to patient with continued pain in resting at night and general discomfort. Patient required verbal cues throughout treatment to decrease speed and increase motor control to increase effectiveness of exercises. Patient with improved range of motion with active assisted exercises and decreased muscle guarding with passive range of motion. PT applied biphasic estim to biceps and deltoid to decrease tightness and muscle irritability. No adverse effects noted.     Mary Is progressing well towards her goals.   Pt prognosis is Good.     Pt will continue to benefit from skilled outpatient physical therapy to address the deficits listed in the problem list box on initial evaluation, provide pt/family education and to maximize pt's level of independence in the home and  community environment.     Pt's spiritual, cultural and educational needs considered and pt agreeable to plan of care and goals.     Anticipated barriers to physical therapy:  work tasks, compliance with Home exercise program     Goals:  Short Term Goals: 3 weeks   Pt will increase R shoulder flexion to 120 degrees, external rotation to C4, and internal rotation to L2 for independent dressing  Pt will increase R upper extremity to 3/5 to allow patient to perform activities of daily living independently  Pt will report decrease in pain to 5/10 to improve quality of life     Long Term Goals: 6 weeks   Pt will increase R shoulder flexion to 155 degrees, external rotation to C4, and internal rotation to L2 for independent dressing  Pt will increase R upper extremity to 4/5 to allow patient to perform activities of daily living independently  Pt will report decrease in pain to 3/10 to improve quality of life  Pt will place 5 pound object in overhead shelf without hike and with less than or equal to 4/10 pain to allow patient to return to prior level of function    Plan      Plan of care Certification: 7/26/2023 to 9/8/2023.     Outpatient Physical Therapy 2 times weekly for 6 weeks to include the following interventions: Electrical Stimulation IFC/Biphasic, Manual Therapy, Moist Heat/ Ice, Neuromuscular Re-ed, Patient Education, Self Care, Therapeutic Activities, Therapeutic Exercise, and Ultrasound.     Continue Plan of Care per PT order to progress patient toward rehab goals as tolerated by patient.     Analy Camargo, PT, DPT   9/27/2023

## 2023-10-02 ENCOUNTER — OFFICE VISIT (OUTPATIENT)
Dept: ORTHOPEDICS | Facility: CLINIC | Age: 53
End: 2023-10-02
Payer: COMMERCIAL

## 2023-10-02 ENCOUNTER — CLINICAL SUPPORT (OUTPATIENT)
Dept: REHABILITATION | Facility: HOSPITAL | Age: 53
End: 2023-10-02
Payer: COMMERCIAL

## 2023-10-02 DIAGNOSIS — R52 PAIN: Primary | ICD-10-CM

## 2023-10-02 DIAGNOSIS — Z98.890 S/P ARTHROSCOPY OF RIGHT SHOULDER: Primary | ICD-10-CM

## 2023-10-02 PROCEDURE — 4010F PR ACE/ARB THEARPY RXD/TAKEN: ICD-10-PCS | Mod: ,,, | Performed by: NURSE PRACTITIONER

## 2023-10-02 PROCEDURE — 1159F MED LIST DOCD IN RCRD: CPT | Mod: ,,, | Performed by: NURSE PRACTITIONER

## 2023-10-02 PROCEDURE — 97110 THERAPEUTIC EXERCISES: CPT | Mod: CQ

## 2023-10-02 PROCEDURE — 99999PBSHW PR PBB SHADOW TECHNICAL ONLY FILED TO HB: ICD-10-PCS | Mod: PBBFAC,,,

## 2023-10-02 PROCEDURE — 99999PBSHW PR PBB SHADOW TECHNICAL ONLY FILED TO HB: Mod: PBBFAC,,,

## 2023-10-02 PROCEDURE — 97530 THERAPEUTIC ACTIVITIES: CPT | Mod: CQ

## 2023-10-02 PROCEDURE — 20610 LARGE JOINT ASPIRATION/INJECTION: R SUBACROMIAL BURSA: ICD-10-PCS | Mod: S$PBB,RT,, | Performed by: NURSE PRACTITIONER

## 2023-10-02 PROCEDURE — 99213 OFFICE O/P EST LOW 20 MIN: CPT | Mod: PBBFAC,25 | Performed by: NURSE PRACTITIONER

## 2023-10-02 PROCEDURE — 20610 DRAIN/INJ JOINT/BURSA W/O US: CPT | Mod: PBBFAC | Performed by: NURSE PRACTITIONER

## 2023-10-02 PROCEDURE — 4010F ACE/ARB THERAPY RXD/TAKEN: CPT | Mod: ,,, | Performed by: NURSE PRACTITIONER

## 2023-10-02 PROCEDURE — 99024 POSTOP FOLLOW-UP VISIT: CPT | Mod: ,,, | Performed by: NURSE PRACTITIONER

## 2023-10-02 PROCEDURE — 99024 PR POST-OP FOLLOW-UP VISIT: ICD-10-PCS | Mod: ,,, | Performed by: NURSE PRACTITIONER

## 2023-10-02 PROCEDURE — 1159F PR MEDICATION LIST DOCUMENTED IN MEDICAL RECORD: ICD-10-PCS | Mod: ,,, | Performed by: NURSE PRACTITIONER

## 2023-10-02 RX ORDER — TRIAMCINOLONE ACETONIDE 40 MG/ML
80 INJECTION, SUSPENSION INTRA-ARTICULAR; INTRAMUSCULAR
Status: DISCONTINUED | OUTPATIENT
Start: 2023-10-02 | End: 2023-10-02 | Stop reason: HOSPADM

## 2023-10-02 RX ADMIN — TRIAMCINOLONE ACETONIDE 80 MG: 40 INJECTION, SUSPENSION INTRA-ARTICULAR; INTRAMUSCULAR at 01:10

## 2023-10-02 NOTE — PROGRESS NOTES
"  [facility]   Physical Therapy Treatment Note    Name: Mary Johansen  Clinic Number: [id number]     Therapy Diagnosis:        Encounter Diagnosis   Name Primary?    S/P arthroscopy of right shoulder          Physician: Jg Driscoll MD    Visit Date: 7/28/2023     Physician Orders: PT Eval and Treat   Medical Diagnosis from Referral: s/p arthroscopy of right shoulder  Evaluation Date: 7/26/2023  Authorization Period Expiration: 7/23/2024  Plan of Care Expiration: 10/13/2023  Progress Note Due: 10/13/2023  Visit # / Visits authorized: 20/24 (hard max of 29)     Precautions: Standard      Time In:  9:40  Time Out: 10:20  Total Billable Time: 40 minutes     PTA Visit #: 1/5  Subjective   Pt reports:  "I'm going to the doctor when I leave here".     She was compliant with home exercise program.  Response to previous treatment: soreness  Functional change: sling d/c     Pain: 2/10  Location: bilateral shoulders     Objective      Objective Measures updated at progress report unless specified.     Treatment   Mary received the treatments listed below:       therapeutic exercises to develop strength, endurance, ROM, flexibility, posture, and core stabilization.       Ther-Ex  Reps   Pulleys 4 minutes (flexion and ABD)    Pendulums  30 x each   Ball Rolls 10 x 10" each    Finger Ladder 5 x 8"    Scap retractions 30 x 3"   Rows with Thera Band  2 x 10, Green    Shoulder extension with Thera Band  2 x 10, Green    Shoulder IR/ER with Thera Band  2 x 10, Green    Upper Trap Stretch 3 x 30" R side only          Cane flexion stretch  5 x 10"    Cane ER stretch  5 x 10"    Sleeper stretch               Passive range of motion: Flexion: 91 degrees  Abduction: NT  ER: 25 degrees  IR: 54 degrees         Therapeutic activities to improve static and dynamic functional performance.  See flow-sheet below.     Ther-Act     I,Y,T's     Prone Rows  2 x 10    Prone Abduction     Prone Extension  2 x " "10    Prone Flexion 10 x                  Not completed:  Supervised modalities after being cleared for contradictions: Biphasic Electrical Stimulation:  Mary received biphasic Electrical Stimulation for pain control applied to the R shoulder biceps and deltoid. Pt received stimulation for 10 minutes. Mary tolerated treatment well without any adverse effects.       Moist hot pack for 10 minutes with estim.    Patient Education and Home Exercises     Written Home Exercises Provided: Patient instructed to cont prior HEP. Exercises were reviewed and Mary was able to demonstrate them prior to the end of the session.  Mary demonstrated good  understanding of the education provided. See EMR under Patient Instructions for exercises provided during therapy sessions    Assessment   Mary is a 52 y.o. female referred to outpatient Physical Therapy with a medical diagnosis of R rotator cuff repair. PT provided patient with proper setup on pulleys to increased R shoulder ROM. Patient required verbal and visual cues throughout treatment to decrease R shoulder hiking compensations.  LPTA providing mod verbal and visual cues to complete 50% of Therapeutic exercises with proper alignment, speed of movement, count and hold times.  Patient continues to demonstrate weakness in Right shoulder with mod effort greater than 60% all planes of motion.  Patient declined modalities and states "I'm going to the doctor.  I hope they give me a shot".  No adverse effects noted or reported.       Mary Is progressing well towards her goals.   Pt prognosis is Good.     Pt will continue to benefit from skilled outpatient physical therapy to address the deficits listed in the problem list box on initial evaluation, provide pt/family education and to maximize pt's level of independence in the home and community environment.     Pt's spiritual, cultural and educational needs considered and pt agreeable to plan of care and " goals.     Anticipated barriers to physical therapy:  work tasks, compliance with Home exercise program     Goals:  Short Term Goals: 3 weeks   Pt will increase R shoulder flexion to 120 degrees, external rotation to C4, and internal rotation to L2 for independent dressing  Pt will increase R upper extremity to 3/5 to allow patient to perform activities of daily living independently  Pt will report decrease in pain to 5/10 to improve quality of life     Long Term Goals: 6 weeks   Pt will increase R shoulder flexion to 155 degrees, external rotation to C4, and internal rotation to L2 for independent dressing  Pt will increase R upper extremity to 4/5 to allow patient to perform activities of daily living independently  Pt will report decrease in pain to 3/10 to improve quality of life  Pt will place 5 pound object in overhead shelf without hike and with less than or equal to 4/10 pain to allow patient to return to prior level of function    Plan      Plan of care Certification: 7/26/2023 to 9/8/2023.     Outpatient Physical Therapy 2 times weekly for 6 weeks to include the following interventions: Electrical Stimulation IFC/Biphasic, Manual Therapy, Moist Heat/ Ice, Neuromuscular Re-ed, Patient Education, Self Care, Therapeutic Activities, Therapeutic Exercise, and Ultrasound.     Continue Plan of Care per PT order to progress patient toward rehab goals as tolerated by patient.     GRZEGORZ Breen   10/2/2023

## 2023-10-02 NOTE — PROGRESS NOTES
52 y.o. Female returns to clinic for a follow up visit regarding     ICD-10-CM ICD-9-CM   1. S/P arthroscopy of right shoulder  Z98.890 V45.89        Patient is still having some tightness in her shoulder and upper arm. Dr. Driscoll had mentioned trying an injection if she was no better.   She is here today for an injection.  Right shoulder arthroscopy.  She was seen by Dr. Driscoll last week.  She had therapy session this morning.       Past Medical History:   Diagnosis Date    Hypertension      Past Surgical History:   Procedure Laterality Date    ARTHROSCOPIC REPAIR OF ROTATOR CUFF OF SHOULDER Right 7/19/2023    Procedure: REPAIR, ROTATOR CUFF, ARTHROSCOPIC;  Surgeon: Jg Driscoll MD;  Location: [facility];  Service: Orthopedics;  Laterality: Right;    ARTHROSCOPY OF SHOULDER WITH DECOMPRESSION OF SUBACROMIAL SPACE Right 7/19/2023    Procedure: ARTHROSCOPY, SHOULDER, WITH SUBACROMIAL SPACE DECOMPRESSION;  Surgeon: Jg Driscoll MD;  Location: [facility];  Service: Orthopedics;  Laterality: Right;    ARTHROSCOPY OF SHOULDER WITH REMOVAL OF DISTAL CLAVICLE Right 7/19/2023    Procedure: ARTHROSCOPY, SHOULDER, WITH DISTAL CLAVICLE EXCISION;  Surgeon: Jg Driscoll MD;  Location: [facility];  Service: Orthopedics;  Laterality: Right;  POSSIBLE REGENETEN PATCH AUGMENTATION    ARTHROSCOPY,SHOULDER,WITH BICEPS TENODESIS Right 7/19/2023    Procedure: ARTHROSCOPY,SHOULDER,WITH BICEPS TENODESIS;  Surgeon: Jg Driscoll MD;  Location: [facility];  Service: Orthopedics;  Laterality: Right;    HYSTERECTOMY  2005    TONSILLECTOMY           PHYSICAL EXAMINATION:    General    Constitutional: She is oriented to person, place, and time. She appears well-developed and well-nourished. No distress.   HENT:   Head: Normocephalic and atraumatic.   Nose: Nose normal.   Eyes: Pupils are equal, round, and reactive to light.   Neck: Neck supple.   Cardiovascular:  Normal rate and intact distal pulses.             Pulmonary/Chest: Effort normal. No respiratory distress.   Abdominal: Soft. She exhibits no distension. There is no abdominal tenderness.   Neurological: She is alert and oriented to person, place, and time. She has normal reflexes. No cranial nerve deficit. She exhibits normal muscle tone.   Psychiatric: She has a normal mood and affect. Her behavior is normal. Judgment normal.         Right Shoulder Exam     Inspection/Observation   Scars: absent  Deformity: absent  Scapular Winging: absent  Atrophy: absent    Tenderness   The patient is tender to palpation of the acromioclavicular joint and biceps tendon.    Range of Motion   Forward Flexion:  normal   Forward Elevation: normal  Adduction: normal  External Rotation 0 degrees:  normal   Internal rotation 0 degrees:  normal   Internal rotation 90 degrees:  normal     Tests & Signs   Drop arm: negative  Lag Sign 0 degrees: negative  Lag Sign 90 degrees: negative  Active Compression Test (Prospect's Sign): positive  Yergason's Test: positive  Speed's Test: positive  Jerk Test: postive    Muscle Strength   Right Upper Extremity   Supraspinatus: 4/5   Subscapularis: 4/5       IMAGING:  No results found.     ASSESSMENT:      ICD-10-CM ICD-9-CM   1. S/P arthroscopy of right shoulder  Z98.890 V45.89       PLAN:     -Findings and treatment options were discussed with the patient  -All questions answered    Right shoulder subacromial injection today.  Keep scheduled follow-up appointment with Dr. Driscoll.    There are no Patient Instructions on file for this visit.      No orders of the defined types were placed in this encounter.        Large Joint Aspiration/Injection: R subacromial bursa    Date/Time: 10/2/2023 1:40 PM    Performed by: Lashonda Hanley FNP  Authorized by: Lashonda Hanley FNP    Consent Done?:  Yes (Verbal)  Indications:  Pain  Site marked: the procedure site was marked    Local anesthetic:  Bupivacaine 0.25% without epinephrine (4 cc's of 0.25%  bupivicaine)    Details:  Needle Size:  22 G  Approach:  Posterior  Location:  Shoulder  Site:  R subacromial bursa  Medications:  80 mg triamcinolone acetonide 40 mg/mL  Patient tolerance:  Patient tolerated the procedure well with no immediate complications

## 2023-10-04 ENCOUNTER — CLINICAL SUPPORT (OUTPATIENT)
Dept: REHABILITATION | Facility: HOSPITAL | Age: 53
End: 2023-10-04
Payer: COMMERCIAL

## 2023-10-04 DIAGNOSIS — R52 PAIN: Primary | ICD-10-CM

## 2023-10-04 PROCEDURE — 97014 ELECTRIC STIMULATION THERAPY: CPT

## 2023-10-04 PROCEDURE — 97110 THERAPEUTIC EXERCISES: CPT

## 2023-10-04 PROCEDURE — 97530 THERAPEUTIC ACTIVITIES: CPT

## 2023-10-04 NOTE — PROGRESS NOTES
"  [facility]   Physical Therapy Treatment Note    Name: Mary Johansen  Clinic Number: [id number]     Therapy Diagnosis:        Encounter Diagnosis   Name Primary?    S/P arthroscopy of right shoulder          Physician: Jg Driscoll MD     Physician Orders: PT Eval and Treat   Medical Diagnosis from Referral: s/p arthroscopy of right shoulder  Evaluation Date: 7/26/2023  Authorization Period Expiration: 7/23/2024  Plan of Care Expiration: 10/13/2023  Progress Note Due: 10/13/2023  Visit # / Visits authorized: 21/24 (hard max of 29)     Precautions: Standard      Time In: 9:40   Time Out: 10:53  Total Billable Time:  73 minutes     PTA Visit #: 0/5  Subjective   Pt reports:  "I got that injection Monday so I am hoping it will start helping."     She was compliant with home exercise program.  Response to previous treatment: soreness  Functional change: sling d/c     Pain: 3/10  Location: bilateral shoulders     Objective      Objective Measures updated at progress report unless specified.     Treatment   Mary received the treatments listed below:       therapeutic exercises to develop strength, endurance, ROM, flexibility, posture, and core stabilization.       Ther-Ex  Reps   Pulleys 4 minutes (flexion and ABD)    Pendulums  30 x each   Ball Rolls 10 x 10" each    Finger Ladder 5 x 8"    Scap retractions 30 x 3"   Rows with Thera Band  2 x 10, Green    Shoulder extension with Thera Band  2 x 10, Green    Shoulder IR/ER with Thera Band  2 x 10, Green    Upper Trap Stretch 3 x 30" R side only          Cane flexion stretch  5 x 10"    Cane ER stretch  5 x 10"    Sleeper stretch  5 x 10"              Passive ROM  Completed but not measured          Therapeutic activities to improve static and dynamic functional performance.  See flow-sheet below.     Ther-Act     I,Y,T's  10 x each    Prone Rows  2 x 10    Prone Abduction     Prone Extension  2 x 10    Prone Flexion 2 x 10          "         Direct contact modalities: after being cleared for contraindications: PT completed ESTIM/ultrasound combo to R shoulder over pectoralis and bicep near insertions to decrease tissue tightness and pain that is restricting ROM.     Patient Education and Home Exercises     Written Home Exercises Provided: Patient instructed to cont prior HEP. Exercises were reviewed and Mary was able to demonstrate them prior to the end of the session.  Mary demonstrated good  understanding of the education provided. See EMR under Patient Instructions for exercises provided during therapy sessions    Assessment   Mary is a 52 y.o. female referred to outpatient Physical Therapy with a medical diagnosis of R rotator cuff repair. PT provided patient with proper setup on pulleys to increased R shoulder ROM. Patient required cues throughout treatment session for decreased compensations especially shoulder hiking. PT initiated sleeper stretch and I,Y,T exercises to increase R should ROM. Patient demonstrated increased difficulty with active range of motion shoulder ABD against gravity compared to flexion and scaption. Patient reported continued pain in anterior R shoulder near bicep and pectoralis insertion that limits her tolerance to passive range of motion. PT noted significant tissue tightness in the same area. PT noted improved tissue extensibility and patient had increased Passive range of motion following modalities. No adverse effects noted to treatment.     Mary Is progressing well towards her goals.   Pt prognosis is Good.     Pt will continue to benefit from skilled outpatient physical therapy to address the deficits listed in the problem list box on initial evaluation, provide pt/family education and to maximize pt's level of independence in the home and community environment.     Pt's spiritual, cultural and educational needs considered and pt agreeable to plan of care and goals.     Anticipated  barriers to physical therapy:  work tasks, compliance with Home exercise program     Goals:  Short Term Goals: 3 weeks   Pt will increase R shoulder flexion to 120 degrees, external rotation to C4, and internal rotation to L2 for independent dressing  Pt will increase R upper extremity to 3/5 to allow patient to perform activities of daily living independently  Pt will report decrease in pain to 5/10 to improve quality of life     Long Term Goals: 6 weeks   Pt will increase R shoulder flexion to 155 degrees, external rotation to C4, and internal rotation to L2 for independent dressing  Pt will increase R upper extremity to 4/5 to allow patient to perform activities of daily living independently  Pt will report decrease in pain to 3/10 to improve quality of life  Pt will place 5 pound object in overhead shelf without hike and with less than or equal to 4/10 pain to allow patient to return to prior level of function    Plan      Plan of care Certification: 7/26/2023 to 9/8/2023.     Outpatient Physical Therapy 2 times weekly for 6 weeks to include the following interventions: Electrical Stimulation IFC/Biphasic, Manual Therapy, Moist Heat/ Ice, Neuromuscular Re-ed, Patient Education, Self Care, Therapeutic Activities, Therapeutic Exercise, and Ultrasound.     Continue Plan of Care per PT order to progress patient toward rehab goals as tolerated by patient.     Alfredo Christine, PT, DPT     10/4/2023

## 2023-10-09 ENCOUNTER — CLINICAL SUPPORT (OUTPATIENT)
Dept: REHABILITATION | Facility: HOSPITAL | Age: 53
End: 2023-10-09
Payer: COMMERCIAL

## 2023-10-09 DIAGNOSIS — R52 PAIN: Primary | ICD-10-CM

## 2023-10-09 PROCEDURE — 97530 THERAPEUTIC ACTIVITIES: CPT

## 2023-10-09 PROCEDURE — 97035 APP MDLTY 1+ULTRASOUND EA 15: CPT

## 2023-10-09 PROCEDURE — 97110 THERAPEUTIC EXERCISES: CPT

## 2023-10-09 NOTE — PROGRESS NOTES
"  [facility]   Physical Therapy Treatment Note    Name: Mary Johansen  Clinic Number: [id number]     Therapy Diagnosis:        Encounter Diagnosis   Name Primary?    S/P arthroscopy of right shoulder          Physician: Jg Driscoll MD     Physician Orders: PT Eval and Treat   Medical Diagnosis from Referral: s/p arthroscopy of right shoulder  Evaluation Date: 7/26/2023  Authorization Period Expiration: 7/23/2024  Plan of Care Expiration: 10/13/2023  Progress Note Due: 10/13/2023  Visit # / Visits authorized: 22/24 (hard max of 29)     Precautions: Standard      Time In: 9:50   Time Out: 10:50  Total Billable Time:  60 minutes     PTA Visit #: 0/5  Subjective   Pt reports:  "Whatever Cuca did with that machine helped me so much the other day."     She was compliant with home exercise program.  Response to previous treatment: soreness  Functional change: sling d/c     Pain: 3/10  Location: bilateral shoulders     Objective      Objective Measures updated at progress report unless specified.     Treatment   Mary received the treatments listed below:       therapeutic exercises to develop strength, endurance, ROM, flexibility, posture, and core stabilization.       Ther-Ex  Reps   Pulleys 4 minutes (flexion and ABD)    Pendulums  30 x each   Ball Rolls 10 x 10" each    Finger Ladder 5 x 8"    Scap retractions 30 x 3"   Rows with Thera Band  2 x 10, Green    Shoulder extension with Thera Band  2 x 10, Green    Shoulder IR/ER with Thera Band  2 x 10, Green    Upper Trap Stretch 3 x 30" R side only          Cane flexion stretch  5 x 10"    Cane ER stretch  5 x 10"    Sleeper stretch  5 x 10"              Passive ROM  Completed but not measured          Therapeutic activities to improve static and dynamic functional performance.  See flow-sheet below.     Ther-Act     I,Y,T's 10 x each    Prone Rows  2 x 10    Prone Abduction     Prone Extension  2 x 10    Prone Flexion 2 x 10      "             Direct contact modalities: after being cleared for contraindications: PT completed ESTIM/ultrasound combo to R bicep near insertions to decrease tissue tightness and pain that is restricting ROM.     Patient Education and Home Exercises     Written Home Exercises Provided: Patient instructed to cont prior HEP. Exercises were reviewed and Mary was able to demonstrate them prior to the end of the session.  Mary demonstrated good  understanding of the education provided. See EMR under Patient Instructions for exercises provided during therapy sessions    Assessment   Mary is a 52 y.o. female referred to outpatient Physical Therapy with a medical diagnosis of R rotator cuff repair. PT provided patient with proper setup on pulleys to increased R shoulder ROM. Patient required cues throughout treatment session for decreased compensations especially scapular compensations with added active range of motion. Patient did not report increase in pain with treatment. Patient with improved biceps tightness so combination used to decrease tissue extensibility. No adverse effects noted to treatment.     Mary Is progressing well towards her goals.   Pt prognosis is Good.     Pt will continue to benefit from skilled outpatient physical therapy to address the deficits listed in the problem list box on initial evaluation, provide pt/family education and to maximize pt's level of independence in the home and community environment.     Pt's spiritual, cultural and educational needs considered and pt agreeable to plan of care and goals.     Anticipated barriers to physical therapy:  work tasks, compliance with Home exercise program     Goals:  Short Term Goals: 3 weeks   Pt will increase R shoulder flexion to 120 degrees, external rotation to C4, and internal rotation to L2 for independent dressing  Pt will increase R upper extremity to 3/5 to allow patient to perform activities of daily living  independently  Pt will report decrease in pain to 5/10 to improve quality of life     Long Term Goals: 6 weeks   Pt will increase R shoulder flexion to 155 degrees, external rotation to C4, and internal rotation to L2 for independent dressing  Pt will increase R upper extremity to 4/5 to allow patient to perform activities of daily living independently  Pt will report decrease in pain to 3/10 to improve quality of life  Pt will place 5 pound object in overhead shelf without hike and with less than or equal to 4/10 pain to allow patient to return to prior level of function    Plan      Plan of care Certification: 7/26/2023 to 9/8/2023.     Outpatient Physical Therapy 2 times weekly for 6 weeks to include the following interventions: Electrical Stimulation IFC/Biphasic, Manual Therapy, Moist Heat/ Ice, Neuromuscular Re-ed, Patient Education, Self Care, Therapeutic Activities, Therapeutic Exercise, and Ultrasound.     Continue Plan of Care per PT order to progress patient toward rehab goals as tolerated by patient.     Analy Camargo, PT, DPT     10/9/2023

## 2023-10-17 ENCOUNTER — OFFICE VISIT (OUTPATIENT)
Dept: ORTHOPEDICS | Facility: CLINIC | Age: 53
End: 2023-10-17
Payer: COMMERCIAL

## 2023-10-17 DIAGNOSIS — Z98.890 S/P ARTHROSCOPY OF RIGHT SHOULDER: Primary | ICD-10-CM

## 2023-10-17 PROCEDURE — 4010F ACE/ARB THERAPY RXD/TAKEN: CPT | Mod: ,,, | Performed by: ORTHOPAEDIC SURGERY

## 2023-10-17 PROCEDURE — 1160F RVW MEDS BY RX/DR IN RCRD: CPT | Mod: ,,, | Performed by: ORTHOPAEDIC SURGERY

## 2023-10-17 PROCEDURE — 99024 POSTOP FOLLOW-UP VISIT: CPT | Mod: ,,, | Performed by: ORTHOPAEDIC SURGERY

## 2023-10-17 PROCEDURE — 99213 OFFICE O/P EST LOW 20 MIN: CPT | Mod: PBBFAC | Performed by: ORTHOPAEDIC SURGERY

## 2023-10-17 PROCEDURE — 4010F PR ACE/ARB THEARPY RXD/TAKEN: ICD-10-PCS | Mod: ,,, | Performed by: ORTHOPAEDIC SURGERY

## 2023-10-17 PROCEDURE — 1159F MED LIST DOCD IN RCRD: CPT | Mod: ,,, | Performed by: ORTHOPAEDIC SURGERY

## 2023-10-17 PROCEDURE — 1159F PR MEDICATION LIST DOCUMENTED IN MEDICAL RECORD: ICD-10-PCS | Mod: ,,, | Performed by: ORTHOPAEDIC SURGERY

## 2023-10-17 PROCEDURE — 1160F PR REVIEW ALL MEDS BY PRESCRIBER/CLIN PHARMACIST DOCUMENTED: ICD-10-PCS | Mod: ,,, | Performed by: ORTHOPAEDIC SURGERY

## 2023-10-17 PROCEDURE — 99024 PR POST-OP FOLLOW-UP VISIT: ICD-10-PCS | Mod: ,,, | Performed by: ORTHOPAEDIC SURGERY

## 2023-10-17 NOTE — PROGRESS NOTES
Repair, Rotator Cuff, Arthroscopic - Right, Arthroscopy, Shoulder, With Subacromial Space Decompression - Right, Arthroscopy,shoulder,with Biceps Tenodesis - Right, and Arthroscopy, Shoulder, With Distal Clavicle Excision - Right 7/19/2023      Pt is here today for Third post-operative followup of her shoulder arthroscopy.      she is doing well.  She is now 12 weeks post-op.   Patient had an injection 10/2. She states that the injection has helped and her shoulder is feeling better.   She is still continuing PT at [facility].     We have reviewed her findings and discussed plan of care and future treatment options, including the physical therapy plan.                                                                                     PHYSICAL EXAMINATION:     Incision sites healed well. Sling is in appropriate position  No evidence of any erythema, infection or induration  Range of motion of the shoulder: 0-110 FF, 0-45 er  Biceps tone appears appropriate      IMAGING: no new imaging    No image results found.                                                                                   ASSESSMENT:                                                                                                                                               1. Status post shoulder arthroscopy, doing well.                                                                                                                               PLAN:                                                                                                                                                     1. Continue with PT  2. Emphasized scapular stabilization to improve overall function.  3. I have discussed return to activity in detail.  4. she will see us back in 4 weeks.                                      5. All questions were answered and she should contact us if she  has any questions or concerns in the interim.              There are no  Patient Instructions on file for this visit.

## 2023-10-18 ENCOUNTER — CLINICAL SUPPORT (OUTPATIENT)
Dept: REHABILITATION | Facility: HOSPITAL | Age: 53
End: 2023-10-18
Payer: COMMERCIAL

## 2023-10-18 ENCOUNTER — TELEPHONE (OUTPATIENT)
Dept: ORTHOPEDICS | Facility: CLINIC | Age: 53
End: 2023-10-18
Payer: COMMERCIAL

## 2023-10-18 DIAGNOSIS — R52 PAIN: Primary | ICD-10-CM

## 2023-10-18 PROCEDURE — 97530 THERAPEUTIC ACTIVITIES: CPT | Mod: CQ

## 2023-10-18 PROCEDURE — 97110 THERAPEUTIC EXERCISES: CPT | Mod: CQ

## 2023-10-18 NOTE — PROGRESS NOTES
"  [facility]   Physical Therapy Treatment Note    Name: Mary Garcia Eleno  Clinic Number: [id number]     Therapy Diagnosis:        Encounter Diagnosis   Name Primary?    S/P arthroscopy of right shoulder          Physician: Jg Driscoll MD     Physician Orders: PT Eval and Treat   Medical Diagnosis from Referral: s/p arthroscopy of right shoulder  Evaluation Date: 7/26/2023  Authorization Period Expiration: 7/23/2024  Plan of Care Expiration: 10/13/2023  Progress Note Due: 10/13/2023  Visit # / Visits authorized: 23/24 (hard max of 29)     Precautions: Standard      Time In: 10:20  Time Out: 11:00  Total Billable Time:  40 minutes total  (30 minutes billable due to over lap in schedule)    PTA Visit #: 1/5  Subjective     Pt reports:  "I went to my doctor yesterday and he lifted my arm up and he heard my shoulder popping.  He said if it's not better in a few weeks he would order an MRI".  Patient continues to report ongoing tightness and pain in Right shoulder and UE.       She was compliant with home exercise program.  Response to previous treatment: soreness  Functional change: sling d/c     Pain: 3/10  Location: bilateral shoulders     Objective      Objective Measures updated at progress report unless specified.     Treatment   Mary received the treatments listed below:       therapeutic exercises to develop strength, endurance, ROM, flexibility, posture, and core stabilization.       Ther-Ex  Reps   Pulleys 4 minutes (flexion and ABD)    Pendulums  30 x each   Ball Rolls 10 x 10" each    Finger Ladder 5 x 8"    Scap retractions 30 x 3"   Rows with Thera Band  2 x 10, Green    Shoulder extension with Thera Band  2 x 10, Green    Shoulder IR/ER with Thera Band  2 x 10, Green    Upper Trap Stretch 3 x 30" R side only          Cane flexion stretch  5 x 10"    Cane ER stretch  5 x 10"    Sleeper stretch  5 x 10"              Passive ROM  Completed but not measured  "         Therapeutic activities to improve static and dynamic functional performance.  See flow-sheet below.     Ther-Act     I,Y,T's 10 x each    Prone Rows  2 x 10    Prone Abduction     Prone Extension  2 x 10    Prone Flexion 2 x 10                  Patient declined modalities:  Direct contact modalities: after being cleared for contraindications: PT completed ESTIM/ultrasound combo to R bicep near insertions to decrease tissue tightness and pain that is restricting ROM.     Patient Education and Home Exercises     Written Home Exercises Provided: Patient instructed to cont prior HEP. Exercises were reviewed and Mary was able to demonstrate them prior to the end of the session.  Mary demonstrated good  understanding of the education provided. See EMR under Patient Instructions for exercises provided during therapy sessions    Assessment   Mary is a 52 y.o. female referred to outpatient Physical Therapy with a medical diagnosis of R rotator cuff repair. LPTA provided patient with proper setup on pulleys to increased R shoulder ROM. Patient required cues throughout treatment session for decreased compensations especially scapular compensation.  Noted continued popping in Right UE so with-held active range of motion.  LPTA informed supervising physical therapist of patient's report following visit with surgeon.  Analy Camargo DPT suggested to patient to contact Dr. Driscoll to see if MRI can be moved up due to limited physical therapy visit and pain and popping in Right shoulder restricting progress in physical therapy.  Patient declined modalities at end of physical therapy treatment session.     Mary Is progressing well towards her goals.   Pt prognosis is Good.     Pt will continue to benefit from skilled outpatient physical therapy to address the deficits listed in the problem list box on initial evaluation, provide pt/family education and to maximize pt's level of independence in the home and  community environment.     Pt's spiritual, cultural and educational needs considered and pt agreeable to plan of care and goals.     Anticipated barriers to physical therapy:  work tasks, compliance with Home exercise program     Goals:  Short Term Goals: 3 weeks   Pt will increase R shoulder flexion to 120 degrees, external rotation to C4, and internal rotation to L2 for independent dressing  Pt will increase R upper extremity to 3/5 to allow patient to perform activities of daily living independently  Pt will report decrease in pain to 5/10 to improve quality of life     Long Term Goals: 6 weeks   Pt will increase R shoulder flexion to 155 degrees, external rotation to C4, and internal rotation to L2 for independent dressing  Pt will increase R upper extremity to 4/5 to allow patient to perform activities of daily living independently  Pt will report decrease in pain to 3/10 to improve quality of life  Pt will place 5 pound object in overhead shelf without hike and with less than or equal to 4/10 pain to allow patient to return to prior level of function    Plan      Plan of care Certification: 7/26/2023 to 9/8/2023.     Outpatient Physical Therapy 2 times weekly for 6 weeks to include the following interventions: Electrical Stimulation IFC/Biphasic, Manual Therapy, Moist Heat/ Ice, Neuromuscular Re-ed, Patient Education, Self Care, Therapeutic Activities, Therapeutic Exercise, and Ultrasound.     Continue Plan of Care per PT order to progress patient toward rehab goals as tolerated by patient.  GRZEGORZ Breen     10/18/2023

## 2023-10-18 NOTE — TELEPHONE ENCOUNTER
Called patient to let her know Dr. Driscoll recommends 4 more weeks of PT before the MRI    ----- Message from Chelsy Khalil sent at 10/18/2023 12:26 PM CDT -----  Therapy wanted her to call about having another MRI. Call back # [phone] or [phone]

## 2023-10-19 NOTE — PLAN OF CARE
"[facility]  Physical Therapy Plan of Care Note     Name: Mary Johansen  Clinic Number: [id number]    Therapy Diagnosis:   Encounter Diagnosis   Name Primary?    Pain Yes     Physician: Jg Driscoll MD    Visit Date: 10/9/2023    Physician Orders: PT Eval and Treat   Medical Diagnosis from Referral: s/p arthroscopy of right shoulder  Evaluation Date: 7/26/2023  Authorization Period Expiration: 7/23/2024  Plan of Care Expiration: 10/13/2023  Progress Note Due: 10/13/2023  Visit # / Visits authorized: 22/24 (hard max of 29)    Precautions: Standard  Functional Level Prior to Evaluation:  independent     Subjective     Update: "Whatever Cuca did with that machine helped me so much the other day."     Objective      Update: All Passive range of motion is painful and guarded:  Flexion: 91 degrees  ER: 25 degrees  IR: 54 degrees    Assessment     Update: Mary is a 52 y.o. female referred to outpatient Physical Therapy with a medical diagnosis of R rotator cuff repair. PT provided patient with proper setup on pulleys to increased R shoulder ROM. Patient required cues throughout treatment session for decreased compensations especially scapular compensations with added active range of motion. Patient did not report increase in pain with treatment. Patient with improved biceps tightness so combination used to decrease tissue extensibility. No adverse effects noted to treatment.     Previous Short Term Goals Status:   continue STG   New Short Term Goals Status:   continue STG continued pain   Long Term Goal Status: continue per initial plan of care.  Reasons for Recertification of Therapy:   continued range of motion, decrease in pain, and strengthening     GOALS  Short Term Goals: 3 weeks   Pt will increase R shoulder flexion to 120 degrees, external rotation to C4, and internal rotation to L2 for independent dressing  Pt will increase R upper extremity to 3/5 to allow patient to " perform activities of daily living independently  Pt will report decrease in pain to 5/10 to improve quality of life     Long Term Goals: 6 weeks   Pt will increase R shoulder flexion to 155 degrees, external rotation to C4, and internal rotation to L2 for independent dressing  Pt will increase R upper extremity to 4/5 to allow patient to perform activities of daily living independently  Pt will report decrease in pain to 3/10 to improve quality of life  Pt will place 5 pound object in overhead shelf without hike and with less than or equal to 4/10 pain to allow patient to return to prior level of function      Plan     Updated Certification Period: 10/9/2023 to 12/1/2023   Recommended Treatment Plan: 1 times per week for 7 weeks:  Electrical Stimulation IFC/Biphasic, Manual Therapy, Moist Heat/ Ice, Neuromuscular Re-ed, Patient Education, Self Care, Therapeutic Activities, Therapeutic Exercise, and Ultrasound  Other Recommendations: N/A    Analy Camargo, PT, DPT 10/19/2023

## 2023-10-25 ENCOUNTER — DOCUMENTATION ONLY (OUTPATIENT)
Dept: REHABILITATION | Facility: HOSPITAL | Age: 53
End: 2023-10-25
Payer: COMMERCIAL

## 2023-10-25 DIAGNOSIS — Z98.890 S/P ARTHROSCOPY OF RIGHT SHOULDER: Primary | ICD-10-CM

## 2023-10-27 ENCOUNTER — TELEPHONE (OUTPATIENT)
Dept: ORTHOPEDICS | Facility: CLINIC | Age: 53
End: 2023-10-27
Payer: COMMERCIAL

## 2023-10-30 DIAGNOSIS — Z98.890 S/P ARTHROSCOPY OF RIGHT SHOULDER: Primary | ICD-10-CM

## 2023-11-06 ENCOUNTER — TELEPHONE (OUTPATIENT)
Dept: ORTHOPEDICS | Facility: CLINIC | Age: 53
End: 2023-11-06
Payer: COMMERCIAL

## 2023-11-06 DIAGNOSIS — Z12.11 SCREENING FOR COLON CANCER: Primary | ICD-10-CM

## 2023-11-06 DIAGNOSIS — F40.240 CLAUSTROPHOBIA: Primary | ICD-10-CM

## 2023-11-06 RX ORDER — DIAZEPAM 10 MG/1
TABLET ORAL
Qty: 1 TABLET | Refills: 0 | Status: SHIPPED | OUTPATIENT
Start: 2023-11-06

## 2023-11-06 RX ORDER — POLYETHYLENE GLYCOL 3350, SODIUM SULFATE ANHYDROUS, SODIUM BICARBONATE, SODIUM CHLORIDE, POTASSIUM CHLORIDE 236; 22.74; 6.74; 5.86; 2.97 G/4L; G/4L; G/4L; G/4L; G/4L
4 POWDER, FOR SOLUTION ORAL ONCE
Qty: 4000 ML | Refills: 0 | Status: SHIPPED | OUTPATIENT
Start: 2023-11-06 | End: 2023-11-06

## 2023-11-06 NOTE — TELEPHONE ENCOUNTER
----- Message from Chelsy Khalil sent at 11/6/2023  8:20 AM CST -----  Needing something to help her have her MRI. MRI is today @ 2 and its an hour drive.

## 2023-11-08 ENCOUNTER — TELEPHONE (OUTPATIENT)
Dept: ORTHOPEDICS | Facility: CLINIC | Age: 53
End: 2023-11-08
Payer: COMMERCIAL

## 2023-11-08 NOTE — TELEPHONE ENCOUNTER
----- Message from Rosy Bautista sent at 11/8/2023  3:06 PM CST -----  Pt needs MRI results. Call [phone]

## 2023-11-10 ENCOUNTER — TELEPHONE (OUTPATIENT)
Dept: ORTHOPEDICS | Facility: CLINIC | Age: 53
End: 2023-11-10
Payer: COMMERCIAL

## 2023-11-10 NOTE — TELEPHONE ENCOUNTER
CALLING TO LET THE PATIENT KNOW WE HAVE HER MRI REPORT AND DR. LANDAVERDE WILL TAKE A LOOK AT IT AFTER SURGERY AND WE WILL GET BACK TO HER ON MONDAY    ----- Message from Rosy Bautista sent at 11/10/2023 10:26 AM [facility] -----  Pt is still wanting MRI results. Please call even if you cant give results per pt. [phone] or [phone].

## 2023-11-14 ENCOUNTER — OFFICE VISIT (OUTPATIENT)
Dept: ORTHOPEDICS | Facility: CLINIC | Age: 53
End: 2023-11-14
Payer: COMMERCIAL

## 2023-11-14 DIAGNOSIS — Z98.890 S/P ARTHROSCOPY OF SHOULDER: Primary | ICD-10-CM

## 2023-11-14 PROCEDURE — 4010F ACE/ARB THERAPY RXD/TAKEN: CPT | Mod: ,,, | Performed by: ORTHOPAEDIC SURGERY

## 2023-11-14 PROCEDURE — 99999PBSHW PR PBB SHADOW TECHNICAL ONLY FILED TO HB: ICD-10-PCS | Mod: PBBFAC,,,

## 2023-11-14 PROCEDURE — 4010F PR ACE/ARB THEARPY RXD/TAKEN: ICD-10-PCS | Mod: ,,, | Performed by: ORTHOPAEDIC SURGERY

## 2023-11-14 PROCEDURE — 99999PBSHW PR PBB SHADOW TECHNICAL ONLY FILED TO HB: Mod: PBBFAC,,,

## 2023-11-14 PROCEDURE — 99213 PR OFFICE/OUTPT VISIT, EST, LEVL III, 20-29 MIN: ICD-10-PCS | Mod: S$PBB,25,, | Performed by: ORTHOPAEDIC SURGERY

## 2023-11-14 PROCEDURE — 99213 OFFICE O/P EST LOW 20 MIN: CPT | Mod: S$PBB,25,, | Performed by: ORTHOPAEDIC SURGERY

## 2023-11-14 PROCEDURE — 99212 OFFICE O/P EST SF 10 MIN: CPT | Mod: PBBFAC | Performed by: ORTHOPAEDIC SURGERY

## 2023-11-14 PROCEDURE — 20610 DRAIN/INJ JOINT/BURSA W/O US: CPT | Mod: PBBFAC | Performed by: ORTHOPAEDIC SURGERY

## 2023-11-14 PROCEDURE — 20610 LARGE JOINT ASPIRATION/INJECTION: R SUBACROMIAL BURSA: ICD-10-PCS | Mod: S$PBB,RT,, | Performed by: ORTHOPAEDIC SURGERY

## 2023-11-14 RX ADMIN — TRIAMCINOLONE ACETONIDE 80 MG: 400 INJECTION, SUSPENSION INTRA-ARTICULAR; INTRAMUSCULAR at 11:11

## 2023-11-14 NOTE — PROCEDURES
Large Joint Aspiration/Injection: R subacromial bursa    Date/Time: 11/14/2023 11:15 AM    Performed by: Jg Driscoll MD  Authorized by: Jg Driscoll MD    Consent Done?:  Yes (Verbal)  Indications:  Pain  Site marked: the procedure site was marked    Local anesthetic:  Bupivacaine 0.25% without epinephrine (4 cc's of 0.25% bupivicaine)    Details:  Needle Size:  22 G  Approach:  Posterior  Location:  Shoulder  Site:  R subacromial bursa  Medications:  80 mg triamcinolone acetonide 40 mg/mL  Patient tolerance:  Patient tolerated the procedure well with no immediate complications

## 2023-11-14 NOTE — PROGRESS NOTES
Mary Johansen returns to clinic for a follow up visit regarding     ICD-10-CM ICD-9-CM   1. S/P arthroscopy of shoulder  Z98.890 V45.89       Pt is here for MRI f/u and treatment moving forward.      PAST MEDICAL HISTORY:   Past Medical History:   Diagnosis Date    Hypertension      PAST SURGICAL HISTORY:   Past Surgical History:   Procedure Laterality Date    ARTHROSCOPIC REPAIR OF ROTATOR CUFF OF SHOULDER Right 7/19/2023    Procedure: REPAIR, ROTATOR CUFF, ARTHROSCOPIC;  Surgeon: Jg Driscoll MD;  Location: [facility] OR;  Service: Orthopedics;  Laterality: Right;    ARTHROSCOPY OF SHOULDER WITH DECOMPRESSION OF SUBACROMIAL SPACE Right 7/19/2023    Procedure: ARTHROSCOPY, SHOULDER, WITH SUBACROMIAL SPACE DECOMPRESSION;  Surgeon: Jg Driscoll MD;  Location: [facility] OR;  Service: Orthopedics;  Laterality: Right;    ARTHROSCOPY OF SHOULDER WITH REMOVAL OF DISTAL CLAVICLE Right 7/19/2023    Procedure: ARTHROSCOPY, SHOULDER, WITH DISTAL CLAVICLE EXCISION;  Surgeon: Jg Driscoll MD;  Location: [facility] OR;  Service: Orthopedics;  Laterality: Right;  POSSIBLE REGENETEN PATCH AUGMENTATION    ARTHROSCOPY,SHOULDER,WITH BICEPS TENODESIS Right 7/19/2023    Procedure: ARTHROSCOPY,SHOULDER,WITH BICEPS TENODESIS;  Surgeon: Jg Driscoll MD;  Location: [facility] OR;  Service: Orthopedics;  Laterality: Right;    HYSTERECTOMY  2005    TONSILLECTOMY           PHYSICAL EXAMINATION:  General    Nursing note and vitals reviewed.  Constitutional: She is oriented to person, place, and time. She appears well-developed and well-nourished. No distress.   HENT:   Head: Normocephalic and atraumatic.   Eyes: Pupils are equal, round, and reactive to light.   Neck: Neck supple.   Cardiovascular:  Normal rate and intact distal pulses.            Pulmonary/Chest: Breath sounds normal. No respiratory distress. She exhibits no tenderness.   Abdominal: Soft. Bowel sounds are normal.   Neurological: She is alert and  oriented to person, place, and time. She has normal reflexes. She displays normal reflexes. No cranial nerve deficit. She exhibits normal muscle tone.   Psychiatric: She has a normal mood and affect. Her behavior is normal. Judgment and thought content normal.         Right Shoulder Exam     Inspection/Observation   Swelling: absent  Bruising: absent  Scapular Dyskinesia: positive    Tenderness   The patient is tender to palpation of the greater tuberosity, acromion and acromioclavicular joint.    Range of Motion   Active abduction:  abnormal   Passive abduction:  abnormal   Forward Flexion:  abnormal   Forward Elevation: abnormal  External Rotation 90 degrees: normal    Tests & Signs   Apprehension: negative  Cross arm: positive  Drop arm: negative  Guerrero test: positive  Impingement: positive  Rotator Cuff Painful Arc/Range: mild  Lag Sign 90 degrees: negative  Lift Off Sign: positive  Belly Press: positive  Active Compression Test (Blythe's Sign): positive  Jerk Test: negative    Other   Sensation: normal    Comments:  Pain with dynamic labral shear test.  There is pain and weakness with Whipple testing.     Left Shoulder Exam   Left shoulder exam is normal.       Muscle Strength   Right Upper Extremity   Shoulder External Rotation: 4/5   Supraspinatus: 4/5   Subscapularis: 4/5   Biceps: 4/5     Reflexes     Right Side   Biceps:  2+  Right Asencio's Sign:  absent    Vascular Exam     Right Pulses      Radial:                    2+      Capillary Refill  Right Hand: normal capillary refill            IMAGING:  No results found.   The MRI from [facility] demonstrates appropriate placement of our anchors and intact articular surface of the rotator cuff.  There is some swelling and edema seen adjacent to the bursal sided attachment of the rotator cuff this may be indicative of incomplete healing but no full-thickness retracted tear seen.    ASSESSMENT:      ICD-10-CM ICD-9-CM   1. S/P arthroscopy of shoulder   Z98.890 V45.89       PLAN:     -Findings and treatment options were discussed with the patient  -All questions answered  Natural history and expected course discussed. Questions answered.  Educational material distributed.  Believe she is suffering from significant bursitis I see no evidence of full-thickness retracted tear of the supraspinatus tendon and infraspinatus.  She did have a complex large rotator cuff tear.  This may take ample time to heal.  I am going to treated with an additional steroid injection today to see if this helps alleviate pain symptoms see her back in 4 weeks continue physical therapy at this time I do think she does need additional physical therapy visits as she had a very large rotator cuff tear      There are no Patient Instructions on file for this visit.    Orders Placed This Encounter   Procedures    Large Joint Aspiration/Injection: R subacromial bursa    Ambulatory referral/consult to Physical/Occupational Therapy       Procedures

## 2023-11-15 ENCOUNTER — HOSPITAL ENCOUNTER (OUTPATIENT)
Dept: RADIOLOGY | Facility: HOSPITAL | Age: 53
Discharge: HOME | End: 2023-11-15
Attending: FAMILY MEDICINE
Payer: COMMERCIAL

## 2023-11-15 VITALS — BODY MASS INDEX: 32.48 KG/M2 | WEIGHT: 232 LBS | HEIGHT: 71 IN

## 2023-11-15 DIAGNOSIS — Z12.31 OTHER SCREENING MAMMOGRAM: ICD-10-CM

## 2023-11-15 PROCEDURE — 77067 SCR MAMMO BI INCL CAD: CPT | Mod: TC

## 2023-11-15 RX ORDER — TRIAMCINOLONE ACETONIDE 40 MG/ML
80 INJECTION, SUSPENSION INTRA-ARTICULAR; INTRAMUSCULAR
Status: DISCONTINUED | OUTPATIENT
Start: 2023-11-14 | End: 2023-11-15 | Stop reason: HOSPADM

## 2023-11-22 ENCOUNTER — CLINICAL SUPPORT (OUTPATIENT)
Dept: REHABILITATION | Facility: HOSPITAL | Age: 53
End: 2023-11-22
Payer: COMMERCIAL

## 2023-11-22 DIAGNOSIS — R52 PAIN: Primary | ICD-10-CM

## 2023-11-22 PROCEDURE — 97110 THERAPEUTIC EXERCISES: CPT

## 2023-11-22 PROCEDURE — 97530 THERAPEUTIC ACTIVITIES: CPT

## 2023-11-22 NOTE — PLAN OF CARE
[facility]  Physical Therapy Plan of Care Note     Name: Mary Johansen  Clinic Number: [id number]    Therapy Diagnosis:   Encounter Diagnosis   Name Primary?    Pain Yes     Physician: Jg Driscoll MD    Visit Date: 11/22/2023    Medical Diagnosis from Referral: s/p arthroscopy of right shoulder  Evaluation Date: 7/26/2023  Authorization Period Expiration: 7/23/2024  Plan of Care Expiration: 12/29/2023  Progress Note Due: 12/29/2023  Visit # / Visits authorized: 24/29 (hard max of 29)     Precautions: Standard  Functional Level Prior to Evaluation:  Independent     Subjective     Update: Patient reports increased functional use of RUE with less pain. She reports improved sleep and no longer using prescription pain medication.     Objective      Update: Patient's R UE active range of motion: flexion:145 degrees, ER:to C6 functional reach, IR to illiac crest functional reach    Assessment     Update: Patient's progress with PT interventions has been slow due to continued levels of high pain guarding. Patient can benefit form continued skilled rehab services to continue progressing toward rehab goals to promote return to PLOF.     Previous Short Term Goals Status:     Pt will increase R shoulder flexion to 120 degrees, external rotation to C4, and internal rotation to L2 for independent dressing-Goal in progress, Flexion 145 degrees, External Rotation to C6, and internal rotation to illiac crest   Pt will increase R upper extremity to 3/5 to allow patient to perform activities of daily living independently-Met within available ROM   Pt will report decrease in pain to 5/10 to improve quality of life-MET   Long Term Goal Status: continue per initial plan of care.  Reasons for Recertification of Therapy:   Continue increasing strength and ROM to progress toward rehab goals and PLOF.     GOALS  Pt will increase R shoulder flexion to 155 degrees, external rotation to C4, and internal  rotation to L2 for independent dressing-Goal in progress, Flexion 145 degrees, External Rotation to C6, and internal rotation to illiac crest   Pt will increase R upper extremity to 4/5 to allow patient to perform activities of daily living independently-Goal in progress   Pt will report decrease in pain to 3/10 to improve quality of life-MET   Pt will place 5 pound object in overhead shelf without hike and with less than or equal to 4/10 pain to allow patient to return to prior level of function  -Goal in progress   Plan     Updated Certification Period: 11/22/2023 to 12/29/2023   Recommended Treatment Plan: 1 times per week for 5 weeks:  Electrical Stimulation unattended, Manual Therapy, Moist Heat/ Ice, Patient Education, Therapeutic Activities, Therapeutic Exercise, and Ultrasound    Alfredo Christine, PT, DPT

## 2023-11-22 NOTE — PROGRESS NOTES
"  [facility]   Physical Therapy Treatment Note    Name: Mary Garcia Eleno  Clinic Number: [id number]     Therapy Diagnosis:        Encounter Diagnosis   Name Primary?    S/P arthroscopy of right shoulder          Physician: Jg Driscoll MD     Physician Orders: PT Eval and Treat   Medical Diagnosis from Referral: s/p arthroscopy of right shoulder  Evaluation Date: 7/26/2023  Authorization Period Expiration: 7/23/2024  Plan of Care Expiration: 12/29/2023  Progress Note Due: 12/29/2023  Visit # / Visits authorized: 24/29 (hard max of 29)     Precautions: Standard      Time In: 8:45 (later check in time)   Time Out: 9:40  Total Billable Time:  55 minutes total     PTA Visit #: 0/5  Subjective     Pt reports: "The doctor says it isn't healing like he would have expected. He said he can see spots on the MRI that aren't healed. He injected it last week and wants me to come back in four weeks."       She was compliant with home exercise program.  Response to previous treatment: soreness  Functional change: sling d/c     Pain: 0/10  Location: bilateral shoulders     Objective      Objective Measures updated at progress report unless specified.     Treatment   Mary received the treatments listed below:       therapeutic exercises to develop strength, endurance, ROM, flexibility, posture, and core stabilization.       Ther-Ex  Reps   Pulleys 4 minutes (flexion and ABD)    Ball Rolls 5 x 10" each flexion on wall and ABD on table    Finger Ladder 5 x 10" each (flexion and ABD)   Rows with Thera Band  2 x 10, Green    Shoulder extension with Thera Band  2 x 10, Green    Shoulder IR/ER with Thera Band  2 x 10, Green    Upper Trap Stretch 3 x 30" R side only          Cane flexion stretch  5 x 10"    Cane ER stretch  5 x 10"    Sleeper stretch  5 x 10"              Passive ROM  Completed   Flexion:  170 degrees   ER:90   IR:64 degrees      Therapeutic activities to improve static and dynamic " functional performance.  See flow-sheet below.     Ther-Act     I,Y,T's 2 x 10 each    Prone Rows 2 x 10    Prone Abduction     Prone Extension 2 x 10    Prone Flexion 2 x 10                  Patient Education and Home Exercises     Written Home Exercises Provided: Patient instructed to cont prior HEP. Exercises were reviewed and Mary was able to demonstrate them prior to the end of the session.  Mary demonstrated good  understanding of the education provided. See EMR under Patient Instructions for exercises provided during therapy sessions    Assessment   Mary is a 52 y.o. female referred to outpatient Physical Therapy with a medical diagnosis of R rotator cuff repair. PT provided patient with proper setup on pulleys to increase R shoulder flexion and abduction ROM prior to exercises. PT cued patient throughout session to decrease R shoulder hiking compensation and increase R scapular stabilization during warm-up and exercises. Patient demonstrated increased R shoulder active range of motion and Passive range of motion with less guarding during Passive range of motion. PT completed patient reassessment due to lapse in PT treatment due waiting for MRI. Patient is progressing with R UE ROM and strength and she reports increased functional use of R UE. Patient can benefit from continued skilled PT services to continue progressing toward rehab goals as ordered by MD. PT completed patient re certification for 1 time per week for 5 weeks due to limited visits left covered by insurance for this calendar year.     Mary Is progressing well towards her goals.   Pt prognosis is Good.     Pt will continue to benefit from skilled outpatient physical therapy to address the deficits listed in the problem list box on initial evaluation, provide pt/family education and to maximize pt's level of independence in the home and community environment.     Pt's spiritual, cultural and educational needs considered and  pt agreeable to plan of care and goals.     Anticipated barriers to physical therapy:  work tasks, compliance with Home exercise program     Goals:  Short Term Goals: 3 weeks   Pt will increase R shoulder flexion to 120 degrees, external rotation to C4, and internal rotation to L2 for independent dressing-Goal in progress, Flexion 145 degrees, External Rotation to C6, and internal rotation to illiac crest   Pt will increase R upper extremity to 3/5 to allow patient to perform activities of daily living independently-Met within available ROM   Pt will report decrease in pain to 5/10 to improve quality of life-MET      Long Term Goals: 6 weeks   Pt will increase R shoulder flexion to 155 degrees, external rotation to C4, and internal rotation to L2 for independent dressing-Goal in progress, Flexion 145 degrees, External Rotation to C6, and internal rotation to illiac crest   Pt will increase R upper extremity to 4/5 to allow patient to perform activities of daily living independently-Goal in progress   Pt will report decrease in pain to 3/10 to improve quality of life-MET   Pt will place 5 pound object in overhead shelf without hike and with less than or equal to 4/10 pain to allow patient to return to prior level of function  -Goal in progress   Plan   Plan of care Certification: 11/22/2023 to 12/29/2023.     Outpatient Physical Therapy 1 times weekly for 5 weeks to include the following interventions: Electrical Stimulation IFC/Biphasic, Manual Therapy, Moist Heat/ Ice, Neuromuscular Re-ed, Patient Education, Self Care, Therapeutic Activities, Therapeutic Exercise, and Ultrasound.     Recert 1 x week for 5 weeks to cotinue progressing toward rehab goals.   Continue Plan of Care per PT order to progress patient toward rehab goals as tolerated by patient.  Alfredo Christine, PT, DPT     11/22/2023

## 2023-11-29 ENCOUNTER — CLINICAL SUPPORT (OUTPATIENT)
Dept: REHABILITATION | Facility: HOSPITAL | Age: 53
End: 2023-11-29
Payer: COMMERCIAL

## 2023-11-29 DIAGNOSIS — R52 PAIN: Primary | ICD-10-CM

## 2023-11-29 PROCEDURE — 97110 THERAPEUTIC EXERCISES: CPT

## 2023-11-29 NOTE — PROGRESS NOTES
"  [facility]   Physical Therapy Treatment Note    Name: Mary Johansen  Clinic Number: [id number]     Therapy Diagnosis:        Encounter Diagnosis   Name Primary?    S/P arthroscopy of right shoulder          Physician: Jg Driscoll MD     Physician Orders: PT Eval and Treat   Medical Diagnosis from Referral: s/p arthroscopy of right shoulder  Evaluation Date: 7/26/2023  Authorization Period Expiration: 7/23/2024  Plan of Care Expiration: 12/29/2023  Progress Note Due: 12/29/2023  Visit # / Visits authorized: 25/29 (hard max of 29)     Precautions: Standard      Time In: 8:48  Time Out: 9:10  Total Billable Time: 22 minutes total     PTA Visit #: 0/5  Subjective     Pt reports: "Cuca said it is bursitis is what the doctor said."       She was compliant with home exercise program.  Response to previous treatment: soreness  Functional change: sling d/c     Pain: 0/10  Location: bilateral shoulders     Objective      Objective Measures updated at progress report unless specified.     Treatment   Mary received the treatments listed below:       therapeutic exercises to develop strength, endurance, ROM, flexibility, posture, and core stabilization.       Ther-Ex  Reps   Pulleys 4 minutes (flexion and ABD)    Ball Rolls 5 x 10" each flexion on wall and ABD on table    Finger Ladder 5 x 10" each (flexion and ABD)   Rows with Thera Band  2 x 10, Green (not completed)   Shoulder extension with Thera Band  2 x 10, Green (not completed)   Shoulder IR/ER with Thera Band  2 x 10, Green (not completed)   Upper Trap Stretch 3 x 30" R side only (not completed)         Cane flexion stretch  5 x 10" (not completed)   Cane ER stretch  5 x 10" (not completed)   Sleeper stretch  5 x 10" (not completed)             Passive ROM  Not Completed   Flexion:  170 degrees   ER:90   IR:64 degrees      Therapeutic activities to improve static and dynamic functional performance.  See flow-sheet below. "     Ther-Act     I,Y,T's 2 x 10 each (attempted but stopped)   Prone Rows 2 x 10 (not completed)   Prone Abduction     Prone Extension 2 x 10 (not completed)   Prone Flexion 2 x 10 (not completed)                  Patient Education and Home Exercises     Written Home Exercises Provided: Patient instructed to cont prior HEP. Exercises were reviewed and Mary was able to demonstrate them prior to the end of the session.  Mary demonstrated good  understanding of the education provided. See EMR under Patient Instructions for exercises provided during therapy sessions    Assessment   Mary is a 52 y.o. female referred to outpatient Physical Therapy with a medical diagnosis of R rotator cuff repair. PT provided patient with proper setup on pulleys to increase R shoulder flexion and abduction ROM prior to exercises. PT cued patient throughout session to decrease R shoulder hiking compensation and increase R scapular stabilization during warm-up and exercises. This PT has not seen patient since last MRI. PT stopped treatment following attempted I,Y, T's due to patient regressing with mobility and having zero rotator cuff activation. Patient reports same pain that she has always had but significantly reduced motion. Patient advised to get second opinion due to treatment.     Mary Is progressing well towards her goals.   Pt prognosis is Good.     Pt will continue to benefit from skilled outpatient physical therapy to address the deficits listed in the problem list box on initial evaluation, provide pt/family education and to maximize pt's level of independence in the home and community environment.     Pt's spiritual, cultural and educational needs considered and pt agreeable to plan of care and goals.     Anticipated barriers to physical therapy:  work tasks, compliance with Home exercise program     Goals:  Short Term Goals: 3 weeks   Pt will increase R shoulder flexion to 120 degrees, external rotation to  C4, and internal rotation to L2 for independent dressing-Goal in progress, Flexion 145 degrees, External Rotation to C6, and internal rotation to illiac crest   Pt will increase R upper extremity to 3/5 to allow patient to perform activities of daily living independently-Met within available ROM   Pt will report decrease in pain to 5/10 to improve quality of life-MET      Long Term Goals: 6 weeks   Pt will increase R shoulder flexion to 155 degrees, external rotation to C4, and internal rotation to L2 for independent dressing-Goal in progress, Flexion 145 degrees, External Rotation to C6, and internal rotation to illiac crest   Pt will increase R upper extremity to 4/5 to allow patient to perform activities of daily living independently-Goal in progress   Pt will report decrease in pain to 3/10 to improve quality of life-MET   Pt will place 5 pound object in overhead shelf without hike and with less than or equal to 4/10 pain to allow patient to return to prior level of function  -Goal in progress   Plan   Plan of care Certification: 11/29/2023 to 12/29/2023.     Outpatient Physical Therapy 1 times weekly for 5 weeks to include the following interventions: Electrical Stimulation IFC/Biphasic, Manual Therapy, Moist Heat/ Ice, Neuromuscular Re-ed, Patient Education, Self Care, Therapeutic Activities, Therapeutic Exercise, and Ultrasound.     Recert 1 x week for 5 weeks to cotinue progressing toward rehab goals.   Continue Plan of Care per PT order to progress patient toward rehab goals as tolerated by patient.    Analy Camargo, PT, DPT     11/29/2023

## 2025-01-06 ENCOUNTER — HOSPITAL ENCOUNTER (OUTPATIENT)
Dept: RADIOLOGY | Facility: HOSPITAL | Age: 55
Discharge: HOME | End: 2025-01-06
Attending: FAMILY MEDICINE
Payer: COMMERCIAL

## 2025-01-06 DIAGNOSIS — R60.0 LOCALIZED EDEMA: ICD-10-CM

## 2025-01-06 PROCEDURE — 93971 EXTREMITY STUDY: CPT | Mod: TC,RT

## 2025-02-04 ENCOUNTER — HOSPITAL ENCOUNTER (OUTPATIENT)
Dept: RADIOLOGY | Facility: HOSPITAL | Age: 55
Discharge: HOME | End: 2025-02-04
Attending: FAMILY MEDICINE
Payer: COMMERCIAL

## 2025-02-04 VITALS — BODY MASS INDEX: 33.04 KG/M2 | WEIGHT: 236 LBS | HEIGHT: 71 IN

## 2025-02-04 DIAGNOSIS — Z12.31 OTHER SCREENING MAMMOGRAM: ICD-10-CM

## 2025-02-04 PROCEDURE — 77063 BREAST TOMOSYNTHESIS BI: CPT | Mod: TC

## 2025-03-25 DIAGNOSIS — M17.11 UNILATERAL PRIMARY OSTEOARTHRITIS, RIGHT KNEE: Primary | ICD-10-CM

## 2025-04-02 ENCOUNTER — CLINICAL SUPPORT (OUTPATIENT)
Dept: REHABILITATION | Facility: HOSPITAL | Age: 55
End: 2025-04-02
Payer: COMMERCIAL

## 2025-04-02 DIAGNOSIS — R53.1 WEAKNESS: ICD-10-CM

## 2025-04-02 DIAGNOSIS — R26.2 DIFFICULTY WALKING: ICD-10-CM

## 2025-04-02 DIAGNOSIS — M17.11 UNILATERAL PRIMARY OSTEOARTHRITIS, RIGHT KNEE: ICD-10-CM

## 2025-04-02 DIAGNOSIS — M25.561 CHRONIC PAIN OF RIGHT KNEE: Primary | ICD-10-CM

## 2025-04-02 DIAGNOSIS — G89.29 CHRONIC PAIN OF RIGHT KNEE: Primary | ICD-10-CM

## 2025-04-02 PROBLEM — R52 PAIN: Status: RESOLVED | Noted: 2023-07-27 | Resolved: 2025-04-02

## 2025-04-02 PROCEDURE — 97110 THERAPEUTIC EXERCISES: CPT

## 2025-04-02 PROCEDURE — 97161 PT EVAL LOW COMPLEX 20 MIN: CPT

## 2025-04-02 NOTE — PATIENT INSTRUCTIONS
Access Code: [email or web address]  URL: [email or web address]  Date: 04/02/2025  Prepared by: Hossein Rahman    Exercises  - Supine Quadricep Sets  - 3 sets - 10 reps - 3 sec hold  - Small Range Straight Leg Raise  - 2 sets - 10 reps  - Supine Short Arc Quad  - 3 sets - 10 reps - 3 sec hold  - Seated Hamstring Stretch  - 3 reps - 30 sec hold  - Gastroc Stretch on Wall  - 3 reps - 30 sec hold

## 2025-04-02 NOTE — PROGRESS NOTES
Outpatient Rehab    Physical Therapy Evaluation    Patient Name: Mary Johanesn  MRN: [id number]  YOB: 1970  Encounter Date: 4/2/2025    Therapy Diagnosis:   Encounter Diagnoses   Name Primary?    Unilateral primary osteoarthritis, right knee     Chronic pain of right knee Yes    Weakness     Difficulty walking      Physician: Jg Driscoll MD    Physician Orders: Eval and Treat  Medical Diagnosis: Unilateral primary osteoarthritis, right knee    Visit # / Visits Authorized:  1 / 1  Insurance Authorization Period: 4/2/2025 to 3/25/2026  Date of Evaluation: 4/2/2025  Plan of Care Certification: 4/2/2025 to 05/16/2025     Time In: 0934   Time Out: 1012  Total Time: 38   Total Billable Time: 38    Intake Outcome Measure for FOTO Survey    Therapist reviewed FOTO scores for Mary Johansen on 4/2/2025.   FOTO report - see Media section or FOTO account episode details.     Intake Score: 68%         Subjective   History of Present Illness  Mary is a 54 y.o. female who reports to physical therapy with a chief concern of pain in R knee.         Diagnostic tests related to this condition: MRI studies.   MRI Studies Details: Medial meniscus: Irregularity along the free edge of the medial meniscus mainly at the level of posterior horn suspicious for tearing.  Lateral meniscus: Complex tear involving the anterior horn of the lateral meniscus.  Anterior cruciate ligament: The anterior cruciate ligament is grossly intact.  Posterior cruciate ligament: The posterior cruciate ligament is grossly intact.  Medial collateral ligament: There is nonspecific edema about the MCL. It would be difficult to exclude grade 1 injury in the setting of recent trauma.  Lateral collateral ligament: The lateral collateral ligament is grossly intact.  Popliteus tendon: The popliteus tendon is grossly intact.  Extensor mechanism: The patellar tendon and the visualized portion of the quadriceps tendon are grossly intact.  " Osseous structures and cartilage: Moderate tricompartmental cartilage loss. There is some mild-to-moderate subchondral cystic change/edema involving the lateral patellar facet greater than the posterior aspect of the medial femoral condyle. Tricompartmental marginal osteophyte formation.  Joint fluid: Moderate knee joint effusion. Scattered nonspecific soft tissue edema.  There is no Baker's cyst.    History of Present Condition/Illness: Pt reports "several months" history of R knee pain. Pt reports that she has a damaged meniscus and some bone spurs in the R knee. She says that she is about ready for "them to do whatever it is they need to do to it" regarding surgery. Pt reports that Dr. Driscoll wanted her to do PT prior to getting scheduled for PT. Pt reports having injection in the knee in February that did not help with the pain. She says that when she is up on it it hurts and swells. She also says that her knee does feel like it is going to buckle or give way when she walks. She says that Dr. Driscoll wants her to return to his office in May for follow up on knee pain.     Pain     Patient reports a current pain level of 5/10. Pain at best is reported as 2/10. Pain at worst is reported as 8/10.   Location: R knee  Clinical Progression (since onset): Stable  Pain-Relieving Factors: Elevation, Medications - over-the-counter  Pain-Aggravating Factors: Walking, Driving, Standing         Living Arrangements  Living Situation  Housing: Home independently  Living Arrangements: Spouse/significant other    Home Setup  Home Access: Stairs with rails  Entrance Stairs - Number of Steps: 4  Number of Levels in Home: One level        Employment  Patient reports: Does the patient's condition impact their ability to work?  Employment Status: Employed full-time          Past Medical History/Physical Systems Review:   Mary Johansen  has a past medical history of Hypertension.    Mary Johansen  has a past surgical history " that includes Hysterectomy (2005); Tonsillectomy; Arthroscopic repair of rotator cuff of shoulder (Right, 7/19/2023); Arthroscopy of shoulder with decompression of subacromial space (Right, 7/19/2023); arthroscopy,shoulder,with biceps tenodesis (Right, 7/19/2023); and Arthroscopy of shoulder with removal of distal clavicle (Right, 7/19/2023).    Mary has a current medication list which includes the following prescription(s): cefuroxime, diazepam, hydrocodone-acetaminophen, lisinopril-hydrochlorothiazide, naproxen, ondansetron, trazodone, and zolpidem.    Review of patient's allergies indicates:   Allergen Reactions    Sulfa (sulfonamide antibiotics) Rash    Pcn [penicillins] Rash        Objective       Knee Swelling  Location of Measurement Right  (cm) Left  (cm)   20 cm Above Joint Line       10 cm Vastus Medialis Oblique       At Joint Line 45.5 42   15 cm Below Joint Line                 Knee Range of Motion   Right Knee   Active (deg) Passive (deg) Pain   Flexion 82       Extension -10           Left Knee   Active (deg) Passive (deg) Pain   Flexion 134       Extension 2                          Knee Strength   Right Strength Right Pain Left Strength Left  Pain   Flexion (S2) 4 Yes 5     Prone Flexion           Extension (L3) 4 Yes 5                   Gait Analysis  Gait Analysis Details  Antalgic gait noted with increased R knee flexion during stance on the RLE and decreased R knee extension during swing on the RLE. Decreased RLE stance time.          Treatment:       Time Entry(in minutes):  PT Evaluation (Low) Time Entry: 26  Therapeutic Exercise Time Entry: 12    Assessment & Plan   Assessment  Mary presents with a condition of Low complexity.   Presentation of Symptoms: Stable  Will Comorbidities Impact Care: Yes  Chronicity of condition, work environment    Functional Limitations: Activity tolerance, Ambulating on uneven surfaces, Pain with ADLs/IADLs, Painful locomotion/ambulation, Sitting  tolerance, Standing tolerance, Gait limitations, Completing work/school activities, Decreased ambulation distance/endurance  Impairments: Impaired physical strength, Weight-bearing intolerance, Activity intolerance  Personal Factors Affecting Prognosis: Pain    Prognosis: Good  Assessment Details: Pt with pain in R knee likely due to degenerative change in bone and other soft tissue in the knee. She has decreased ROM, strength, and functional ability.     Plan  From a physical therapy perspective, the patient would benefit from: Skilled Rehab Services    Planned therapy interventions include: Therapeutic exercise, Therapeutic activities, Neuromuscular re-education, Manual therapy, and Gait training.    Planned modalities to include: Cryotherapy (cold pack), Electrical stimulation - passive/unattended, Thermotherapy (hot pack), and Ultrasound.        Visit Frequency: 2 times Per Week for 6 Weeks.       This plan was discussed with Patient.              Patient's spiritual, cultural, and educational needs considered and patient agreeable to plan of care and goals.           Goals:   Active       Long term goals       Pt will be independent with progressive HEP       Start:  04/02/25    Expected End:  05/16/25            Pt will increase FOTO score to greater than or equal to 74 in order to demonstrate an increase in functional ability       Start:  04/02/25    Expected End:  05/16/25            Pt will increase R knee flexion AROM to 120 deg in order to make LE dressing more efficient       Start:  04/02/25    Expected End:  05/16/25            Pt will increase strength in the R knee to grossly 5/5 in order to make work tasks more efficient       Start:  04/02/25    Expected End:  05/16/25            Pt will be able to squat to retrieve object from the floor       Start:  04/02/25    Expected End:  05/16/25               Short term goals       Pt will increase R knee flexion ext AROM to 0 in order to make gait pattern  more efficient       Start:  04/02/25    Expected End:  04/25/25            Pt will increase R knee flexion AROM to 100 deg in order to make gait pattern more efficient       Start:  04/02/25    Expected End:  04/25/25            Pt will increase strength in the R knee to grossly 4+/5 in order to make transitional movements like sit to stand more efficient       Start:  04/02/25    Expected End:  04/25/25                Hossein Rahman, PT, DPT  4/2/2025

## 2025-04-03 ENCOUNTER — CLINICAL SUPPORT (OUTPATIENT)
Dept: REHABILITATION | Facility: HOSPITAL | Age: 55
End: 2025-04-03
Payer: COMMERCIAL

## 2025-04-03 DIAGNOSIS — M25.561 CHRONIC PAIN OF RIGHT KNEE: ICD-10-CM

## 2025-04-03 DIAGNOSIS — G89.29 CHRONIC PAIN OF RIGHT KNEE: ICD-10-CM

## 2025-04-03 DIAGNOSIS — R53.1 WEAKNESS: ICD-10-CM

## 2025-04-03 DIAGNOSIS — M17.11 UNILATERAL PRIMARY OSTEOARTHRITIS, RIGHT KNEE: Primary | ICD-10-CM

## 2025-04-03 PROCEDURE — 97110 THERAPEUTIC EXERCISES: CPT | Mod: CQ

## 2025-04-03 PROCEDURE — 97112 NEUROMUSCULAR REEDUCATION: CPT | Mod: CQ

## 2025-04-03 PROCEDURE — 97530 THERAPEUTIC ACTIVITIES: CPT | Mod: CQ

## 2025-04-03 NOTE — PROGRESS NOTES
"  Outpatient Rehab    Physical Therapy Visit    Patient Name: Mary Johansen  MRN: [id number]  YOB: 1970  Encounter Date: 4/3/2025    Therapy Diagnosis:   Encounter Diagnoses   Name Primary?    Unilateral primary osteoarthritis, right knee Yes    Chronic pain of right knee     Weakness      Physician: Jg Driscoll MD    Physician Orders: Eval and Treat  Medical Diagnosis: Unilateral primary osteoarthritis, right knee    Visit # / Visits Authorized:  2 / 1  Insurance Authorization Period: 4/2/2025 to 3/25/2026  Date of Evaluation: 4/2/2025  Plan of Care Certification: 4/2/2025 to 05/16/2025      PT/PTA:     Number of PTA visits since last PT visit: 1/5  Time In: 1100   Time Out: 1155  Total Time: 55   Total Billable Time: 50    FOTO:  Intake Score:  %  Survey Score 1:  %  Survey Score 2:  %      Received Plan of Care per Hossein Rahman PT        Subjective   pain in knee as noted; no pain meds taken.  Pain reported as 6/10. R knee    Objective            Treatment:  Therapeutic Exercise  TE 1: nustep x 6 min  TE 2: wedge B calf stretch x 2 min  TE 3: Hamstring stretch in standing on steps, 3x20 s/h  TE 4: standing RF stretch w/ towel, 3x20 s/h  TE 5: seated towel slides for flexion  x 20 reps  Balance/Neuromuscular Re-Education  NMR 1: seated quad sets x 20 reps w/ foot propped on top of steps  NMR 2: seated SLR x 20 reps w/ foot propped on top of steps, using stretch rite strap for assist  NMR 3: long arc quads x 20 reps w/ stretch rite strap  Therapeutic Activity  TA 1: sit to stand, 2x10 w/ blue foam pad in seat  TA 2: 4" step ups, fwd and lateral, x 10 reps each  TA 3: addressed proper gait w/ focus on TKE w/ heel strike and knee flex w/ toe off; pt able to correct and carry over  Modalities  Cryotherapy (Minutes\Location): 5 min to R knee in long sit    Time Entry(in minutes):  Hot/Cold Pack Time Entry: 5  Neuromuscular Re-Education Time Entry: 15  Therapeutic Activity Time Entry: " "15  Therapeutic Exercise Time Entry: 20    Assessment & Plan   Assessment: pt limited in extension as observed; noticeable edema in R knee compared to L knee but "better today" per patient; pt reported 3/10 pain post treatment  Evaluation/Treatment Tolerance: Patient tolerated treatment well    Patient will continue to benefit from skilled outpatient physical therapy to address the deficits listed in the problem list box on initial evaluation, provide pt/family education and to maximize pt's level of independence in the home and community environment.         Plan: Continue per POC and progress as pt able    Goals:   Active       Long term goals       Pt will be independent with progressive HEP       Start:  04/02/25    Expected End:  05/16/25            Pt will increase FOTO score to greater than or equal to 74 in order to demonstrate an increase in functional ability       Start:  04/02/25    Expected End:  05/16/25            Pt will increase R knee flexion AROM to 120 deg in order to make LE dressing more efficient       Start:  04/02/25    Expected End:  05/16/25            Pt will increase strength in the R knee to grossly 5/5 in order to make work tasks more efficient       Start:  04/02/25    Expected End:  05/16/25            Pt will be able to squat to retrieve object from the floor       Start:  04/02/25    Expected End:  05/16/25               Short term goals       Pt will increase R knee flexion ext AROM to 0 in order to make gait pattern more efficient       Start:  04/02/25    Expected End:  04/25/25            Pt will increase R knee flexion AROM to 100 deg in order to make gait pattern more efficient       Start:  04/02/25    Expected End:  04/25/25            Pt will increase strength in the R knee to grossly 4+/5 in order to make transitional movements like sit to stand more efficient       Start:  04/02/25    Expected End:  04/25/25                Hillary Bautista, PTA    "

## 2025-04-08 ENCOUNTER — CLINICAL SUPPORT (OUTPATIENT)
Dept: REHABILITATION | Facility: HOSPITAL | Age: 55
End: 2025-04-08
Payer: COMMERCIAL

## 2025-04-08 DIAGNOSIS — R53.1 WEAKNESS: ICD-10-CM

## 2025-04-08 DIAGNOSIS — M25.561 CHRONIC PAIN OF RIGHT KNEE: Primary | ICD-10-CM

## 2025-04-08 DIAGNOSIS — G89.29 CHRONIC PAIN OF RIGHT KNEE: Primary | ICD-10-CM

## 2025-04-08 PROCEDURE — 97110 THERAPEUTIC EXERCISES: CPT | Mod: CQ

## 2025-04-08 PROCEDURE — 97112 NEUROMUSCULAR REEDUCATION: CPT | Mod: CQ

## 2025-04-08 NOTE — PROGRESS NOTES
"  Outpatient Rehab    Physical Therapy Visit    Patient Name: Mary Johansen  MRN: [id number]  YOB: 1970  Encounter Date: 4/8/2025    Therapy Diagnosis:   Encounter Diagnoses   Name Primary?    Chronic pain of right knee Yes    Weakness      Physician: Jg Driscoll MD    Physician Orders: Eval and Treat  Medical Diagnosis: Unilateral primary osteoarthritis, right knee    Visit # / Visits Authorized:  3 / 12  Insurance Authorization Period: 4/2/2025 to 3/25/2026  Date of Evaluation: 4/2/2025  Plan of Care Certification: 4/2/2025 to 05/16/2025      PT/PTA:     Number of PTA visits since last PT visit: 2/5  Time In: 1111 (pt running late for appt)   Time Out: 1150  Total Time: 39   Total Billable Time: 34    FOTO:  Intake Score:  %  Survey Score 1:  %  Survey Score 2:  %         Subjective   pain as noted; saw rheumatologist yesterday; took ibuprofen today.  Pain reported as 5/10. R knee    Objective            Treatment:  Therapeutic Exercise  TE 1: nustep x 5 min  TE 2: wedge B calf stretch x 2 min  TE 3: Hamstring stretch in standing on steps, 3x20 s/h  TE 4: standing RF stretch w/ towel, 3x20 s/h  TE 5: seated towel slides for flexion  x 20 reps  Balance/Neuromuscular Re-Education  NMR 1: seated quad sets x 20 reps w/ foot propped on top of steps  NMR 2: seated SLR x 20 reps w/ foot propped on top of steps, using stretch rite strap for assist  NMR 3: long arc quads x 20 reps w/ stretch rite strap  Therapeutic Activity  TA 1: sit to stand, 2x10 w/ blue foam pad in seat  TA 2: 4" step ups, fwd and lateral, x 10 reps each  TA 3: addressed proper gait w/ focus on TKE w/ heel strike and knee flex w/ toe off; pt able to correct and carry over  Modalities  Cryotherapy (Minutes\Location): 5 min to R knee in long sit    Time Entry(in minutes):  Hot/Cold Pack Time Entry: 5  Neuromuscular Re-Education Time Entry: 13  Therapeutic Activity Time Entry: 6  Therapeutic Exercise Time Entry: 15    Assessment & " Plan   Assessment: d/w pt that she may benefit from cane use to offload R knee w/ prolonged ambulation  Evaluation/Treatment Tolerance: Patient tolerated treatment well    Patient will continue to benefit from skilled outpatient physical therapy to address the deficits listed in the problem list box on initial evaluation, provide pt/family education and to maximize pt's level of independence in the home and community environment.         Plan: Continue per POC and progress as pt able    Goals:   Active       Long term goals       Pt will be independent with progressive HEP       Start:  04/02/25    Expected End:  05/16/25            Pt will increase FOTO score to greater than or equal to 74 in order to demonstrate an increase in functional ability       Start:  04/02/25    Expected End:  05/16/25            Pt will increase R knee flexion AROM to 120 deg in order to make LE dressing more efficient       Start:  04/02/25    Expected End:  05/16/25            Pt will increase strength in the R knee to grossly 5/5 in order to make work tasks more efficient       Start:  04/02/25    Expected End:  05/16/25            Pt will be able to squat to retrieve object from the floor       Start:  04/02/25    Expected End:  05/16/25               Short term goals       Pt will increase R knee flexion ext AROM to 0 in order to make gait pattern more efficient       Start:  04/02/25    Expected End:  04/25/25            Pt will increase R knee flexion AROM to 100 deg in order to make gait pattern more efficient       Start:  04/02/25    Expected End:  04/25/25            Pt will increase strength in the R knee to grossly 4+/5 in order to make transitional movements like sit to stand more efficient       Start:  04/02/25    Expected End:  04/25/25                Hillary Bautista, PTA

## 2025-04-10 ENCOUNTER — CLINICAL SUPPORT (OUTPATIENT)
Dept: REHABILITATION | Facility: HOSPITAL | Age: 55
End: 2025-04-10
Payer: COMMERCIAL

## 2025-04-10 DIAGNOSIS — M25.561 CHRONIC PAIN OF RIGHT KNEE: Primary | ICD-10-CM

## 2025-04-10 DIAGNOSIS — G89.29 CHRONIC PAIN OF RIGHT KNEE: Primary | ICD-10-CM

## 2025-04-10 DIAGNOSIS — R53.1 WEAKNESS: ICD-10-CM

## 2025-04-10 PROCEDURE — 97010 HOT OR COLD PACKS THERAPY: CPT

## 2025-04-10 PROCEDURE — 97112 NEUROMUSCULAR REEDUCATION: CPT

## 2025-04-10 PROCEDURE — 97110 THERAPEUTIC EXERCISES: CPT

## 2025-04-10 PROCEDURE — 97530 THERAPEUTIC ACTIVITIES: CPT

## 2025-04-10 NOTE — PROGRESS NOTES
Outpatient Rehab    Physical Therapy Visit    Patient Name: Mary Johansen  MRN: [id number]  YOB: 1970  Encounter Date: 4/10/2025    Therapy Diagnosis: No diagnosis found.  Physician: Jg Driscoll MD    Physician Orders: Eval and Treat  Medical Diagnosis: Unilateral primary osteoarthritis, right knee    Visit # / Visits Authorized:  4 / 12  Insurance Authorization Period: 4/2/2025 to 3/25/2026  Date of Evaluation: 4/2/2025  Plan of Care Certification: 4/2/2025 to 05/16/2025     PT/PTA: PT   Number of PTA visits since last PT visit:0  Time In: 1240   Time Out: 1325  Total Time: 45   Total Billable Time: 45    FOTO:  Intake Score:  %  Survey Score 1:  %  Survey Score 2:  %         Subjective   Pt reports that the prednisone she has been taking since about Monday of this week seems to be helping her knee to feel better..         Objective            Treatment:  Therapeutic Exercise  TE 1: nustep x 6 min  TE 2: wedge B calf stretch x 2 min  TE 3: Hamstring stretch in standing on steps, 3 x 30 s/h  TE 4: heel raises: 20  TE 5: standing hip abd and ext, each LE: 15 each way  Balance/Neuromuscular Re-Education  NMR 1: SAQ: 2 x 10, 3 sec hold  NMR 2: SLR, RLE: 2 x 10  Therapeutic Activity  TA 1: mini squats: 2 x 10  TA 2: 4-inch forward and lateral step ups: 2 x 10, each way  Modalities  Cryotherapy (Minutes\Location): 8 min to R knee in supine    Time Entry(in minutes):  Hot/Cold Pack Time Entry: 8  Neuromuscular Re-Education Time Entry: 8  Therapeutic Activity Time Entry: 12  Therapeutic Exercise Time Entry: 17    Assessment & Plan   Assessment: Pt with reports of less knee pain today is able to tolerate continuation of exercises/activities from previous treatment session with no increase in knee pain. Ended with ice to the R knee for pain and soreness control. Skin is intact post modalities. Will continue to progress as able and as tolerated in coming visits.       Patient will continue to benefit  from skilled outpatient physical therapy to address the deficits listed in the problem list box on initial evaluation, provide pt/family education and to maximize pt's level of independence in the home and community environment.     Patient's spiritual, cultural, and educational needs considered and patient agreeable to plan of care and goals.           Plan: Continue per POC and progress as pt able    Goals:   Active       Long term goals       Pt will be independent with progressive HEP       Start:  04/02/25    Expected End:  05/16/25            Pt will increase FOTO score to greater than or equal to 74 in order to demonstrate an increase in functional ability       Start:  04/02/25    Expected End:  05/16/25            Pt will increase R knee flexion AROM to 120 deg in order to make LE dressing more efficient       Start:  04/02/25    Expected End:  05/16/25            Pt will increase strength in the R knee to grossly 5/5 in order to make work tasks more efficient       Start:  04/02/25    Expected End:  05/16/25            Pt will be able to squat to retrieve object from the floor       Start:  04/02/25    Expected End:  05/16/25               Short term goals       Pt will increase R knee flexion ext AROM to 0 in order to make gait pattern more efficient       Start:  04/02/25    Expected End:  04/25/25            Pt will increase R knee flexion AROM to 100 deg in order to make gait pattern more efficient       Start:  04/02/25    Expected End:  04/25/25            Pt will increase strength in the R knee to grossly 4+/5 in order to make transitional movements like sit to stand more efficient       Start:  04/02/25    Expected End:  04/25/25                Hossein Rahman, PT, DPT  4/10/2025

## 2025-04-16 ENCOUNTER — CLINICAL SUPPORT (OUTPATIENT)
Dept: REHABILITATION | Facility: HOSPITAL | Age: 55
End: 2025-04-16
Payer: COMMERCIAL

## 2025-04-16 DIAGNOSIS — R53.1 WEAKNESS: ICD-10-CM

## 2025-04-16 DIAGNOSIS — M25.561 CHRONIC PAIN OF RIGHT KNEE: Primary | ICD-10-CM

## 2025-04-16 DIAGNOSIS — G89.29 CHRONIC PAIN OF RIGHT KNEE: Primary | ICD-10-CM

## 2025-04-16 PROCEDURE — 97530 THERAPEUTIC ACTIVITIES: CPT | Mod: CQ

## 2025-04-16 PROCEDURE — 97110 THERAPEUTIC EXERCISES: CPT | Mod: CQ

## 2025-04-16 NOTE — PROGRESS NOTES
Outpatient Rehab    Physical Therapy Visit    Patient Name: Mary Johansen  MRN: [id number]  YOB: 1970  Encounter Date: 4/16/2025    Therapy Diagnosis:   Encounter Diagnoses   Name Primary?    Chronic pain of right knee Yes    Weakness      Physician: Jg Driscoll MD    Physician Orders: Eval and Treat  Medical Diagnosis: Unilateral primary osteoarthritis, right knee    Visit # / Visits Authorized:  5 / 12  Insurance Authorization Period: 4/2/2025 to 3/25/2026  Date of Evaluation: 4/2/2025  Plan of Care Certification: 4/2/2025 to 05/16/2025     PT/PTA:     Number of PTA visits since last PT visit: 1/5  Time In: 1541 (11 min late for appt today)   Time Out: 1615  Total Time: 34   Total Billable Time: 34    FOTO:  Intake Score:  %  Survey Score 1:  %  Survey Score 2:  %         Subjective   over did it while out of town, walking; soreness.  Pain reported as 3/10. R knee    Objective       Knee Range of Motion   Right Knee   Active (deg) Passive (deg) Pain   Flexion 110       Extension 0           0 degrees quad lag w/ SLR            Treatment:  Therapeutic Exercise  TE 1: nustep x 5 min  TE 2: wedge B calf stretch x 2 min  TE 3: Hamstring stretch in standing on steps w/ alternating knee flex stretching, 3 x 30 s/h  TE 4: SLRs x 20 reps  TE 5: .  Therapeutic Activity  TA 1: BLE ex x 15 reps each: marching, mini squats, heel/toe raises, hip abd, hip ext  TA 2: 6-inch forward and lateral step ups: 2 x 10, each way  TA 3: sit to stand w/ no BUE support x 20 reps    Time Entry(in minutes):  Therapeutic Activity Time Entry: 20  Therapeutic Exercise Time Entry: 14    Assessment & Plan   Assessment: pt participated well w/ treatment today; improved ROM and slowly improving strength  Evaluation/Treatment Tolerance: Patient tolerated treatment well    Patient will continue to benefit from skilled outpatient physical therapy to address the deficits listed in the problem list box on initial evaluation,  provide pt/family education and to maximize pt's level of independence in the home and community environment.         Plan: Continue per POC and progress as pt able    Goals:   Active       Long term goals       Pt will be independent with progressive HEP       Start:  04/02/25    Expected End:  05/16/25            Pt will increase FOTO score to greater than or equal to 74 in order to demonstrate an increase in functional ability       Start:  04/02/25    Expected End:  05/16/25            Pt will increase R knee flexion AROM to 120 deg in order to make LE dressing more efficient       Start:  04/02/25    Expected End:  05/16/25            Pt will increase strength in the R knee to grossly 5/5 in order to make work tasks more efficient       Start:  04/02/25    Expected End:  05/16/25            Pt will be able to squat to retrieve object from the floor       Start:  04/02/25    Expected End:  05/16/25               Short term goals       Pt will increase R knee flexion ext AROM to 0 in order to make gait pattern more efficient       Start:  04/02/25    Expected End:  04/25/25            Pt will increase R knee flexion AROM to 100 deg in order to make gait pattern more efficient       Start:  04/02/25    Expected End:  04/25/25            Pt will increase strength in the R knee to grossly 4+/5 in order to make transitional movements like sit to stand more efficient       Start:  04/02/25    Expected End:  04/25/25                Hillary Bautista, PTA

## 2025-04-17 ENCOUNTER — CLINICAL SUPPORT (OUTPATIENT)
Dept: REHABILITATION | Facility: HOSPITAL | Age: 55
End: 2025-04-17
Payer: COMMERCIAL

## 2025-04-17 DIAGNOSIS — R26.2 DIFFICULTY WALKING: ICD-10-CM

## 2025-04-17 DIAGNOSIS — M17.11 UNILATERAL PRIMARY OSTEOARTHRITIS, RIGHT KNEE: Primary | ICD-10-CM

## 2025-04-17 DIAGNOSIS — G89.29 CHRONIC PAIN OF RIGHT KNEE: ICD-10-CM

## 2025-04-17 DIAGNOSIS — R53.1 WEAKNESS: ICD-10-CM

## 2025-04-17 DIAGNOSIS — M25.561 CHRONIC PAIN OF RIGHT KNEE: ICD-10-CM

## 2025-04-17 PROCEDURE — 97110 THERAPEUTIC EXERCISES: CPT

## 2025-04-17 PROCEDURE — 97010 HOT OR COLD PACKS THERAPY: CPT

## 2025-04-17 PROCEDURE — 97112 NEUROMUSCULAR REEDUCATION: CPT

## 2025-04-17 PROCEDURE — 97530 THERAPEUTIC ACTIVITIES: CPT

## 2025-04-17 NOTE — PROGRESS NOTES
Outpatient Rehab    Physical Therapy Visit    Patient Name: Mary Johansen  MRN: [id number]  YOB: 1970  Encounter Date: 4/17/2025    Therapy Diagnosis:   Encounter Diagnoses   Name Primary?    Unilateral primary osteoarthritis, right knee Yes    Chronic pain of right knee     Weakness     Difficulty walking      Physician: Jg Driscoll MD    Physician Orders: Eval and Treat  Medical Diagnosis: Unilateral primary osteoarthritis, right knee    Visit # / Visits Authorized:  6 / 12  Insurance Authorization Period: 4/2/2025 to 3/25/2026  Date of Evaluation: 4/2/2025  Plan of Care Certification: 4/2/2025 to 05/16/2025  FOTO due: next visit     PT/PTA: PT   Number of PTA visits since last PT visit:0  Time In: 1015   Time Out: 1108  Total Time: 53   Total Billable Time: 53    FOTO:  Intake Score:  %  Survey Score 1:  %  Survey Score 2:  %         Subjective   Pt reports continued pain, swelling, and warmth in the R knee that gets worse with increased activity and prolonged sitting. She says that she is to see MD again on May 1st..         Objective            Treatment:  Therapeutic Exercise  TE 2: wedge B calf stretch x 2 min  TE 3: Hamstring stretch in standing on steps: 3 x 30 sec hold  TE 4: heel raises: 20  TE 5: standing hip abd and ext: 20, each LE  Balance/Neuromuscular Re-Education  NMR 1: quad sets: 20 x 3 sec hold  NMR 2: SLR, RLE: 2 x 10  NMR 3: SAQ: 2 x 10, 3 sec hold  Therapeutic Activity  TA 1: mini squats: 2 x 10  TA 2: 4-inch forward step ups: 10  TA 3: 4-inch lateral step ups: 10  Modalities  Cryotherapy (Minutes\Location): 8 min to R knee in longsitting with black foam roll under knees    Time Entry(in minutes):  Hot/Cold Pack Time Entry: 8  Neuromuscular Re-Education Time Entry: 12  Therapeutic Activity Time Entry: 12  Therapeutic Exercise Time Entry: 21    Assessment & Plan   Assessment: Pt able to tolerate continuation of select exercises/activities from previous treatment  session with no reports of increased R knee pain. Due to pt's report of continued pain in the R knee and reports of needing to ride to [place] to go grocery shopping kept treatment session lighter than usual so as to avoid excessively flaring up pain. Ended with ice to the R knee for pain/soreness control. Will continue to progress as able and as tolerated in coming visits.       Patient will continue to benefit from skilled outpatient physical therapy to address the deficits listed in the problem list box on initial evaluation, provide pt/family education and to maximize pt's level of independence in the home and community environment.     Patient's spiritual, cultural, and educational needs considered and patient agreeable to plan of care and goals.           Plan: Continue per POC and progress as pt able    Goals:   Active       Long term goals       Pt will be independent with progressive HEP       Start:  04/02/25    Expected End:  05/16/25            Pt will increase FOTO score to greater than or equal to 74 in order to demonstrate an increase in functional ability       Start:  04/02/25    Expected End:  05/16/25            Pt will increase R knee flexion AROM to 120 deg in order to make LE dressing more efficient       Start:  04/02/25    Expected End:  05/16/25            Pt will increase strength in the R knee to grossly 5/5 in order to make work tasks more efficient       Start:  04/02/25    Expected End:  05/16/25            Pt will be able to squat to retrieve object from the floor       Start:  04/02/25    Expected End:  05/16/25               Short term goals       Pt will increase R knee flexion ext AROM to 0 in order to make gait pattern more efficient       Start:  04/02/25    Expected End:  04/25/25            Pt will increase R knee flexion AROM to 100 deg in order to make gait pattern more efficient       Start:  04/02/25    Expected End:  04/25/25            Pt will increase strength in the R  knee to grossly 4+/5 in order to make transitional movements like sit to stand more efficient       Start:  04/02/25    Expected End:  04/25/25                Hossein Rahman PT, DPT  4/17/2025

## 2025-04-22 ENCOUNTER — CLINICAL SUPPORT (OUTPATIENT)
Dept: REHABILITATION | Facility: HOSPITAL | Age: 55
End: 2025-04-22
Payer: COMMERCIAL

## 2025-04-22 DIAGNOSIS — G89.29 CHRONIC PAIN OF RIGHT KNEE: Primary | ICD-10-CM

## 2025-04-22 DIAGNOSIS — R53.1 WEAKNESS: ICD-10-CM

## 2025-04-22 DIAGNOSIS — R52 PAIN: ICD-10-CM

## 2025-04-22 DIAGNOSIS — M17.11 UNILATERAL PRIMARY OSTEOARTHRITIS, RIGHT KNEE: ICD-10-CM

## 2025-04-22 DIAGNOSIS — M25.561 CHRONIC PAIN OF RIGHT KNEE: Primary | ICD-10-CM

## 2025-04-22 PROCEDURE — 97112 NEUROMUSCULAR REEDUCATION: CPT | Mod: CQ

## 2025-04-22 PROCEDURE — 97110 THERAPEUTIC EXERCISES: CPT | Mod: CQ

## 2025-04-22 PROCEDURE — 97010 HOT OR COLD PACKS THERAPY: CPT | Mod: CQ

## 2025-04-22 PROCEDURE — 97014 ELECTRIC STIMULATION THERAPY: CPT | Mod: CQ

## 2025-04-22 NOTE — PROGRESS NOTES
Outpatient Rehab    Physical Therapy Visit    Patient Name: Mary Johansen  MRN: [id number]  YOB: 1970  Encounter Date: 4/22/2025    Therapy Diagnosis:   Encounter Diagnoses   Name Primary?    Chronic pain of right knee Yes    Weakness     Pain     Unilateral primary osteoarthritis, right knee      Physician: Jg Driscoll MD    Physician Orders: Eval and Treat  Medical Diagnosis: Unilateral primary osteoarthritis, right knee    Visit # / Visits Authorized:  7 / 12  Insurance Authorization Period: 4/2/2025 to 3/25/2026  Date of Evaluation: 4/2/2025  Plan of Care Certification: 4/2/2025 to 05/16/2025  FOTO due: next visit     PT/PTA:     Number of PTA visits since last PT visit: 1/5  Time In: 1015   Time Out: 1110  Total Time: 55   Total Billable Time: 55    FOTO:  Intake Score:  %  Survey Score 1:  %  Survey Score 2:  %         Subjective   over did it over easter weekend; pain as noted; no pain meds taken.  Pain reported as 6/10. R knee    Objective            Treatment:  Therapeutic Exercise  TE 1: nustep x 8 min  TE 2: wedge B calf stretch x 2 min  TE 3: Hamstring stretch in standing on steps: 4 x 30 sec hold  TE 4: seated towel slides for knee flex x 20 reps  TE 5: .  Balance/Neuromuscular Re-Education  NMR 1: estim quad sets x 5 min (without estim x 20 reps)  NMR 2: w/ estim SLR x 5 min  NMR 3: w/ estim SAQ x 5 min  NMR 4: seated LAQs x 20 reps  Therapeutic Activity  TA 1: mini squats: 2 x 10 (not today)  TA 2: 4-inch forward step ups: 10 (not today)  TA 3: 4-inch lateral step ups: 10 (not today)  Modalities  Cryotherapy (Minutes\Location): 10 min to R knee w/ IFC  Electrical Stimulation (Parameters): IFC to R knee for pain management    Time Entry(in minutes):  E-Stim (Unattended) Time Entry: 10  Hot/Cold Pack Time Entry: 10  Neuromuscular Re-Education Time Entry: 20  Therapeutic Exercise Time Entry: 25    Assessment & Plan   Assessment: Pt reported pain much better after session:  2/10  Evaluation/Treatment Tolerance: Patient limited by pain    Patient will continue to benefit from skilled outpatient physical therapy to address the deficits listed in the problem list box on initial evaluation, provide pt/family education and to maximize pt's level of independence in the home and community environment.          Plan: Continue per POC and progress as pt able    Goals:   Active       Long term goals       Pt will be independent with progressive HEP       Start:  04/02/25    Expected End:  05/16/25            Pt will increase FOTO score to greater than or equal to 74 in order to demonstrate an increase in functional ability       Start:  04/02/25    Expected End:  05/16/25            Pt will increase R knee flexion AROM to 120 deg in order to make LE dressing more efficient       Start:  04/02/25    Expected End:  05/16/25            Pt will increase strength in the R knee to grossly 5/5 in order to make work tasks more efficient       Start:  04/02/25    Expected End:  05/16/25            Pt will be able to squat to retrieve object from the floor       Start:  04/02/25    Expected End:  05/16/25               Short term goals       Pt will increase R knee flexion ext AROM to 0 in order to make gait pattern more efficient       Start:  04/02/25    Expected End:  04/25/25            Pt will increase R knee flexion AROM to 100 deg in order to make gait pattern more efficient       Start:  04/02/25    Expected End:  04/25/25            Pt will increase strength in the R knee to grossly 4+/5 in order to make transitional movements like sit to stand more efficient       Start:  04/02/25    Expected End:  04/25/25                Hillary Bautista, PTA

## 2025-04-24 ENCOUNTER — CLINICAL SUPPORT (OUTPATIENT)
Dept: REHABILITATION | Facility: HOSPITAL | Age: 55
End: 2025-04-24
Payer: COMMERCIAL

## 2025-04-24 DIAGNOSIS — M17.11 UNILATERAL PRIMARY OSTEOARTHRITIS, RIGHT KNEE: ICD-10-CM

## 2025-04-24 DIAGNOSIS — M25.561 CHRONIC PAIN OF RIGHT KNEE: Primary | ICD-10-CM

## 2025-04-24 DIAGNOSIS — G89.29 CHRONIC PAIN OF RIGHT KNEE: Primary | ICD-10-CM

## 2025-04-24 DIAGNOSIS — R53.1 WEAKNESS: ICD-10-CM

## 2025-04-24 DIAGNOSIS — R26.2 DIFFICULTY WALKING: ICD-10-CM

## 2025-04-24 DIAGNOSIS — R52 PAIN: ICD-10-CM

## 2025-04-24 PROCEDURE — 97112 NEUROMUSCULAR REEDUCATION: CPT | Mod: CQ

## 2025-04-24 PROCEDURE — 97010 HOT OR COLD PACKS THERAPY: CPT | Mod: CQ

## 2025-04-24 PROCEDURE — 97530 THERAPEUTIC ACTIVITIES: CPT | Mod: CQ

## 2025-04-24 PROCEDURE — 97110 THERAPEUTIC EXERCISES: CPT | Mod: CQ

## 2025-04-24 NOTE — PROGRESS NOTES
"  Outpatient Rehab    Physical Therapy Visit    Patient Name: Mary Johansen  MRN: [id number]  YOB: 1970  Encounter Date: 4/24/2025    Therapy Diagnosis:   No diagnosis found.    Physician: Jg Driscoll MD    Physician Orders: Eval and Treat  Medical Diagnosis: Unilateral primary osteoarthritis, right knee    Visit # / Visits Authorized:  8 / 12  Insurance Authorization Period: 4/2/2025 to 3/25/2026  Date of Evaluation: 4/2/2025  Plan of Care Certification: 4/2/2025 to 05/16/2025  FOTO due: next visit     PT/PTA: PTA   Number of PTA visits since last PT visit:1  Time In: 1020   Time Out: 1115  Total Time: 55   Total Billable Time: 45    FOTO:  Intake Score:  %  Survey Score 1:  %  Survey Score 2:  %         Subjective   "I had an allergic reaction to something, and had to get a Steriod shot yeterday so I'm sure that's going to help my knee too"..  Pain reported as 2/10. R knee    Objective            Treatment:  Therapeutic Exercise  TE 1: nustep x 8 min  TE 2: wedge B calf stretch x 2 min  TE 3: Hamstring stretch seated in chair, 3 x 20"  TE 4: seated towel slides for knee flex x 20 reps  Balance/Neuromuscular Re-Education  NMR 1: estim quad sets x 5 min (without estim x 20 reps)  NMR 2: w/ estim SLR x 5 min  NMR 3: w/ estim SAQ x 5 min  NMR 4: seated LAQs x 20 reps  Therapeutic Activity  TA 1: mini squats: 2 x 10  TA 2: 4-inch forward step ups: 2 x 10  TA 3: 4-inch lateral step ups: 2 x 10 *  TA 4: Heel raises 2 x 10  Modalities  Cryotherapy (Minutes\Location): 10 min to R knee w LE elevated greater than heart level at end of PT treatment session    Time Entry(in minutes):  Hot/Cold Pack Time Entry: 10  Neuromuscular Re-Education Time Entry: 15  Therapeutic Activity Time Entry: 15  Therapeutic Exercise Time Entry: 15    Assessment & Plan   Assessment: Patient able to progress through completion of PT treatment session including standing Therapeutic activities without reports of increased pain. " " Patient reports "This is what gets me" when beginning Quad sets with EStim. Patient able to complete SLR's with cues to decrease Quad lag.   Patient denies reports of increased pain in Right knee at end of PT treamtent.       Patient will continue to benefit from skilled outpatient physical therapy to address the deficits listed in the problem list box on initial evaluation, provide pt/family education and to maximize pt's level of independence in the home and community environment.    Education  Education was done with Patient. The patient's learning style includes Demonstration and Listening. The patient Demonstrates understanding and Verbalizes understanding.         Plan of Care; Home exercise program; Delayed onset muscle soreness        Plan: Continue per POC and progress as pt able    Goals:   Active       Long term goals       Pt will be independent with progressive HEP       Start:  04/02/25    Expected End:  05/16/25            Pt will increase FOTO score to greater than or equal to 74 in order to demonstrate an increase in functional ability       Start:  04/02/25    Expected End:  05/16/25            Pt will increase R knee flexion AROM to 120 deg in order to make LE dressing more efficient       Start:  04/02/25    Expected End:  05/16/25            Pt will increase strength in the R knee to grossly 5/5 in order to make work tasks more efficient       Start:  04/02/25    Expected End:  05/16/25            Pt will be able to squat to retrieve object from the floor       Start:  04/02/25    Expected End:  05/16/25               Short term goals       Pt will increase R knee flexion ext AROM to 0 in order to make gait pattern more efficient       Start:  04/02/25    Expected End:  04/25/25            Pt will increase R knee flexion AROM to 100 deg in order to make gait pattern more efficient       Start:  04/02/25    Expected End:  04/25/25            Pt will increase strength in the R knee to grossly " 4+/5 in order to make transitional movements like sit to stand more efficient       Start:  04/02/25    Expected End:  04/25/25                Norma Cope, PTA 4/25/2025

## 2025-05-09 ENCOUNTER — DOCUMENTATION ONLY (OUTPATIENT)
Dept: REHABILITATION | Facility: HOSPITAL | Age: 55
End: 2025-05-09
Payer: COMMERCIAL

## 2025-05-09 NOTE — PROGRESS NOTES
[facility]  Physical Therapy Discharge Note    Patient Name: Mary Johansen  MRN: [id number]  YOB: 1970    Therapy Diagnosis:   No diagnosis found.     Physician: Jg Driscoll MD     Physician Orders: Eval and Treat  Medical Diagnosis: Unilateral primary osteoarthritis, right knee     Date of Last visit: 4/24/2025  Total Visits Received: 8    ASSESSMENT      Pt is being discharged from PT at this time per MD request to hold on PT for the time being. Pt told us that MD told her to discontinue therapy due to her L ankle swelling. He wanted her off her foot. She also states that she is scheduled for knee surgery in June.      Discharge reason: Other:  physician DC    Discharge FOTO Score: NA    Goals: unable to assess goal statuses at this time    PLAN   This patient is discharged from Physical Therapy      Hossein Rahman PT, DPT    5/9/2025

## 2025-05-27 NOTE — TELEPHONE ENCOUNTER
DONE; ORDER FAXED FOR OPEN MRI     ----- Message from Rosy Bautista sent at 10/26/2023  2:31 PM CDT -----  Regarding: MRI appt  Pt has MRI on 11/02. Told MRI center on [address] that she needs an open MRI done. Call [phone].     Awake/Alert/Cooperative

## 2025-06-10 ENCOUNTER — TELEPHONE (OUTPATIENT)
Dept: CARDIOLOGY | Facility: HOSPITAL | Age: 55
End: 2025-06-10
Payer: COMMERCIAL

## 2025-06-10 NOTE — TELEPHONE ENCOUNTER
Called to update patient with upcoming Joint Class, tomorrow, 6/11/25, at 12pm, in the [facility] Se[facility]; no answer, left voicemail.

## 2025-06-19 NOTE — PRE ADMISSION SCREENING
Talked with belle 06/17 pt does not have any labs/ clearance.    Messaged belle 06/18, pt has seen dr aguilar for labs, clearance , I have none of it on file.

## 2025-06-23 ENCOUNTER — ANESTHESIA EVENT (OUTPATIENT)
Dept: SURGERY | Facility: HOSPITAL | Age: 55
End: 2025-06-23
Payer: COMMERCIAL

## 2025-06-23 ENCOUNTER — HOSPITAL ENCOUNTER (OUTPATIENT)
Facility: HOSPITAL | Age: 55
Discharge: HOME | End: 2025-06-24
Attending: ORTHOPAEDIC SURGERY | Admitting: ORTHOPAEDIC SURGERY
Payer: COMMERCIAL

## 2025-06-23 ENCOUNTER — ANESTHESIA (OUTPATIENT)
Dept: SURGERY | Facility: HOSPITAL | Age: 55
End: 2025-06-23
Payer: COMMERCIAL

## 2025-06-23 DIAGNOSIS — M17.11 UNILATERAL PRIMARY OSTEOARTHRITIS, RIGHT KNEE: Primary | ICD-10-CM

## 2025-06-23 PROBLEM — I10 PRIMARY HYPERTENSION: Status: ACTIVE | Noted: 2025-06-23

## 2025-06-23 PROBLEM — N39.42 URINARY INCONTINENCE WITHOUT SENSORY AWARENESS: Status: ACTIVE | Noted: 2025-06-23

## 2025-06-23 PROBLEM — Z96.651 STATUS POST TOTAL RIGHT KNEE REPLACEMENT: Status: ACTIVE | Noted: 2025-06-23

## 2025-06-23 LAB
ALBUMIN SERPL BCP-MCNC: 3.3 G/DL (ref 3.5–5)
ALBUMIN/GLOB SERPL: 0.9 {RATIO}
ALP SERPL-CCNC: 61 U/L (ref 40–150)
ALT SERPL W P-5'-P-CCNC: 15 U/L
ANION GAP SERPL CALCULATED.3IONS-SCNC: 14 MMOL/L (ref 7–16)
AST SERPL W P-5'-P-CCNC: 20 U/L (ref 11–45)
BASOPHILS # BLD AUTO: 0.01 K/UL (ref 0–0.2)
BASOPHILS NFR BLD AUTO: 0.1 % (ref 0–1)
BILIRUB SERPL-MCNC: 0.2 MG/DL
BUN SERPL-MCNC: 14 MG/DL (ref 10–20)
BUN/CREAT SERPL: 14 (ref 6–20)
CALCIUM SERPL-MCNC: 8.2 MG/DL (ref 8.4–10.2)
CHLORIDE SERPL-SCNC: 107 MMOL/L (ref 98–107)
CO2 SERPL-SCNC: 23 MMOL/L (ref 22–29)
CREAT SERPL-MCNC: 1.02 MG/DL (ref 0.55–1.02)
DIFFERENTIAL METHOD BLD: ABNORMAL
EGFR (NO RACE VARIABLE) (RUSH/TITUS): 65 ML/MIN/1.73M2
EOSINOPHIL # BLD AUTO: 0 K/UL (ref 0–0.5)
EOSINOPHIL NFR BLD AUTO: 0 % (ref 1–4)
ERYTHROCYTE [DISTWIDTH] IN BLOOD BY AUTOMATED COUNT: 14 % (ref 11.5–14.5)
GLOBULIN SER-MCNC: 3.7 G/DL (ref 2–4)
GLUCOSE SERPL-MCNC: 195 MG/DL (ref 74–100)
HCT VFR BLD AUTO: 36.7 % (ref 38–47)
HGB BLD-MCNC: 11.4 G/DL (ref 12–16)
IMM GRANULOCYTES # BLD AUTO: 0.02 K/UL (ref 0–0.04)
IMM GRANULOCYTES NFR BLD: 0.2 % (ref 0–0.4)
LYMPHOCYTES # BLD AUTO: 0.44 K/UL (ref 1–4.8)
LYMPHOCYTES NFR BLD AUTO: 4 % (ref 27–41)
MCH RBC QN AUTO: 27.1 PG (ref 27–31)
MCHC RBC AUTO-ENTMCNC: 31.1 G/DL (ref 32–36)
MCV RBC AUTO: 87.2 FL (ref 80–96)
MONOCYTES # BLD AUTO: 0.13 K/UL (ref 0–0.8)
MONOCYTES NFR BLD AUTO: 1.2 % (ref 2–6)
MPC BLD CALC-MCNC: 10.6 FL (ref 9.4–12.4)
NEUTROPHILS # BLD AUTO: 10.52 K/UL (ref 1.8–7.7)
NEUTROPHILS NFR BLD AUTO: 94.5 % (ref 53–65)
NRBC # BLD AUTO: 0 X10E3/UL
NRBC, AUTO (.00): 0 %
PLATELET # BLD AUTO: 204 K/UL (ref 150–400)
POTASSIUM SERPL-SCNC: 3.6 MMOL/L (ref 3.5–5.1)
PROT SERPL-MCNC: 7 G/DL (ref 6.4–8.3)
RBC # BLD AUTO: 4.21 M/UL (ref 4.2–5.4)
SODIUM SERPL-SCNC: 140 MMOL/L (ref 136–145)
WBC # BLD AUTO: 11.12 K/UL (ref 4.5–11)

## 2025-06-23 PROCEDURE — 97161 PT EVAL LOW COMPLEX 20 MIN: CPT

## 2025-06-23 PROCEDURE — 85025 COMPLETE CBC W/AUTO DIFF WBC: CPT

## 2025-06-23 PROCEDURE — 36415 COLL VENOUS BLD VENIPUNCTURE: CPT

## 2025-06-23 PROCEDURE — 80053 COMPREHEN METABOLIC PANEL: CPT

## 2025-06-23 PROCEDURE — 27000716 HC OXISENSOR PROBE, ANY SIZE: Performed by: ANESTHESIOLOGY

## 2025-06-23 PROCEDURE — 71000016 HC POSTOP RECOV ADDL HR: Performed by: ORTHOPAEDIC SURGERY

## 2025-06-23 PROCEDURE — 25000003 PHARM REV CODE 250: Performed by: NURSE ANESTHETIST, CERTIFIED REGISTERED

## 2025-06-23 PROCEDURE — 25000003 PHARM REV CODE 250: Performed by: ORTHOPAEDIC SURGERY

## 2025-06-23 PROCEDURE — 27000177 HC AIRWAY, LARYNGEAL MASK: Performed by: ANESTHESIOLOGY

## 2025-06-23 PROCEDURE — 63600175 PHARM REV CODE 636 W HCPCS: Performed by: NURSE ANESTHETIST, CERTIFIED REGISTERED

## 2025-06-23 PROCEDURE — 27000655: Performed by: ANESTHESIOLOGY

## 2025-06-23 PROCEDURE — 27201423 OPTIME MED/SURG SUP & DEVICES STERILE SUPPLY: Performed by: ORTHOPAEDIC SURGERY

## 2025-06-23 PROCEDURE — D9220A PRA ANESTHESIA: Mod: ANES,,, | Performed by: ANESTHESIOLOGY

## 2025-06-23 PROCEDURE — 37000009 HC ANESTHESIA EA ADD 15 MINS: Performed by: ORTHOPAEDIC SURGERY

## 2025-06-23 PROCEDURE — 71000015 HC POSTOP RECOV 1ST HR: Performed by: ORTHOPAEDIC SURGERY

## 2025-06-23 PROCEDURE — 63600175 PHARM REV CODE 636 W HCPCS: Performed by: ORTHOPAEDIC SURGERY

## 2025-06-23 PROCEDURE — 99900035 HC TECH TIME PER 15 MIN (STAT)

## 2025-06-23 PROCEDURE — C1713 ANCHOR/SCREW BN/BN,TIS/BN: HCPCS | Performed by: ORTHOPAEDIC SURGERY

## 2025-06-23 PROCEDURE — 64447 NJX AA&/STRD FEMORAL NRV IMG: CPT | Mod: XU,RT,, | Performed by: ANESTHESIOLOGY

## 2025-06-23 PROCEDURE — 37000008 HC ANESTHESIA 1ST 15 MINUTES: Performed by: ORTHOPAEDIC SURGERY

## 2025-06-23 PROCEDURE — C1776 JOINT DEVICE (IMPLANTABLE): HCPCS | Performed by: ORTHOPAEDIC SURGERY

## 2025-06-23 PROCEDURE — 71000033 HC RECOVERY, INTIAL HOUR: Performed by: ORTHOPAEDIC SURGERY

## 2025-06-23 PROCEDURE — D9220A PRA ANESTHESIA: Mod: CRNA,,, | Performed by: NURSE ANESTHETIST, CERTIFIED REGISTERED

## 2025-06-23 PROCEDURE — 36000713 HC OR TIME LEV V EA ADD 15 MIN: Performed by: ORTHOPAEDIC SURGERY

## 2025-06-23 PROCEDURE — 94761 N-INVAS EAR/PLS OXIMETRY MLT: CPT

## 2025-06-23 PROCEDURE — 63600175 PHARM REV CODE 636 W HCPCS: Performed by: ANESTHESIOLOGY

## 2025-06-23 PROCEDURE — 27000510 HC BLANKET BAIR HUGGER ANY SIZE: Performed by: ANESTHESIOLOGY

## 2025-06-23 PROCEDURE — 36000712 HC OR TIME LEV V 1ST 15 MIN: Performed by: ORTHOPAEDIC SURGERY

## 2025-06-23 DEVICE — ATTUNE KNEE SYSTEM TIBIAL BASE POROCOAT ROTATING PLATFORM SIZE 4 CEMENTLESS
Type: IMPLANTABLE DEVICE | Site: KNEE | Status: FUNCTIONAL
Brand: ATTUNE

## 2025-06-23 DEVICE — ATTUNE KNEE SYSTEM FEMORAL POROCOAT CRUCIATE RETAINING SIZE 4 RIGHT CEMENTLESS
Type: IMPLANTABLE DEVICE | Site: KNEE | Status: FUNCTIONAL
Brand: ATTUNE

## 2025-06-23 DEVICE — SPEEDSET FULL DOSE ANTIBIOTIC BONE CEMENT, 10 PACK CATALOG NUMBER IS 6192-1-010
Type: IMPLANTABLE DEVICE | Site: KNEE | Status: FUNCTIONAL
Brand: SIMPLEX

## 2025-06-23 DEVICE — ATTUNE KNEE SYSTEM TIBIAL INSERT ROTATING PLATFORM CRUCIATE RETAINING SIZE 4 6MM AOX
Type: IMPLANTABLE DEVICE | Site: KNEE | Status: FUNCTIONAL
Brand: ATTUNE

## 2025-06-23 DEVICE — ATTUNE PATELLA MEDIALIZED DOME 35MM CEMENTED AOX
Type: IMPLANTABLE DEVICE | Site: KNEE | Status: FUNCTIONAL
Brand: ATTUNE

## 2025-06-23 RX ORDER — PREGABALIN 75 MG/1
75 CAPSULE ORAL 2 TIMES DAILY
Status: DISCONTINUED | OUTPATIENT
Start: 2025-06-23 | End: 2025-06-23

## 2025-06-23 RX ORDER — OXYCODONE HYDROCHLORIDE 5 MG/1
5 TABLET ORAL EVERY 4 HOURS PRN
Status: DISCONTINUED | OUTPATIENT
Start: 2025-06-23 | End: 2025-06-23

## 2025-06-23 RX ORDER — DIPHENHYDRAMINE HYDROCHLORIDE 50 MG/ML
25 INJECTION, SOLUTION INTRAMUSCULAR; INTRAVENOUS EVERY 6 HOURS PRN
Status: DISCONTINUED | OUTPATIENT
Start: 2025-06-23 | End: 2025-06-23 | Stop reason: HOSPADM

## 2025-06-23 RX ORDER — KETOROLAC TROMETHAMINE 30 MG/ML
INJECTION, SOLUTION INTRAMUSCULAR; INTRAVENOUS
Status: DISCONTINUED | OUTPATIENT
Start: 2025-06-23 | End: 2025-06-23

## 2025-06-23 RX ORDER — ACETAMINOPHEN 10 MG/ML
1000 INJECTION, SOLUTION INTRAVENOUS ONCE
Status: COMPLETED | OUTPATIENT
Start: 2025-06-23 | End: 2025-06-23

## 2025-06-23 RX ORDER — MUPIROCIN 20 MG/G
1 OINTMENT TOPICAL 2 TIMES DAILY
Status: DISCONTINUED | OUTPATIENT
Start: 2025-06-23 | End: 2025-06-23

## 2025-06-23 RX ORDER — ZOLPIDEM TARTRATE 5 MG/1
5 TABLET ORAL NIGHTLY PRN
Status: DISCONTINUED | OUTPATIENT
Start: 2025-06-23 | End: 2025-06-23

## 2025-06-23 RX ORDER — TRANEXAMIC ACID 1 G/10ML
INJECTION, SOLUTION INTRAVENOUS
Status: DISCONTINUED | OUTPATIENT
Start: 2025-06-23 | End: 2025-06-23

## 2025-06-23 RX ORDER — HYDROMORPHONE HYDROCHLORIDE 2 MG/ML
0.5 INJECTION, SOLUTION INTRAMUSCULAR; INTRAVENOUS; SUBCUTANEOUS EVERY 5 MIN PRN
Status: DISCONTINUED | OUTPATIENT
Start: 2025-06-23 | End: 2025-06-23 | Stop reason: HOSPADM

## 2025-06-23 RX ORDER — ACETAMINOPHEN 10 MG/ML
INJECTION, SOLUTION INTRAVENOUS
Status: DISCONTINUED | OUTPATIENT
Start: 2025-06-23 | End: 2025-06-23

## 2025-06-23 RX ORDER — ONDANSETRON 4 MG/1
8 TABLET, ORALLY DISINTEGRATING ORAL EVERY 8 HOURS PRN
Status: DISCONTINUED | OUTPATIENT
Start: 2025-06-23 | End: 2025-06-23

## 2025-06-23 RX ORDER — EPINEPHRINE 1 MG/ML
INJECTION INTRAMUSCULAR; INTRAVENOUS; SUBCUTANEOUS
Status: DISCONTINUED | OUTPATIENT
Start: 2025-06-23 | End: 2025-06-23

## 2025-06-23 RX ORDER — FAMOTIDINE 20 MG/1
20 TABLET, FILM COATED ORAL 2 TIMES DAILY
Status: DISCONTINUED | OUTPATIENT
Start: 2025-06-23 | End: 2025-06-23

## 2025-06-23 RX ORDER — MIDAZOLAM HYDROCHLORIDE 1 MG/ML
INJECTION INTRAMUSCULAR; INTRAVENOUS
Status: DISCONTINUED | OUTPATIENT
Start: 2025-06-23 | End: 2025-06-23

## 2025-06-23 RX ORDER — DOCUSATE SODIUM 100 MG/1
100 CAPSULE, LIQUID FILLED ORAL EVERY 12 HOURS
Status: DISCONTINUED | OUTPATIENT
Start: 2025-06-23 | End: 2025-06-24 | Stop reason: HOSPADM

## 2025-06-23 RX ORDER — PROPOFOL 10 MG/ML
VIAL (ML) INTRAVENOUS
Status: DISCONTINUED | OUTPATIENT
Start: 2025-06-23 | End: 2025-06-23

## 2025-06-23 RX ORDER — SODIUM CHLORIDE 9 MG/ML
INJECTION, SOLUTION INTRAVENOUS CONTINUOUS
Status: DISCONTINUED | OUTPATIENT
Start: 2025-06-23 | End: 2025-06-23

## 2025-06-23 RX ORDER — ONDANSETRON 4 MG/1
8 TABLET, ORALLY DISINTEGRATING ORAL EVERY 8 HOURS PRN
Qty: 30 TABLET | Refills: 0 | Status: SHIPPED | OUTPATIENT
Start: 2025-06-23

## 2025-06-23 RX ORDER — ACETAMINOPHEN 500 MG
1000 TABLET ORAL EVERY 8 HOURS
Status: DISCONTINUED | OUTPATIENT
Start: 2025-06-24 | End: 2025-06-24 | Stop reason: HOSPADM

## 2025-06-23 RX ORDER — PREDNISONE 5 MG/1
5 TABLET ORAL DAILY
Qty: 21 TABLET | Refills: 0 | Status: SHIPPED | OUTPATIENT
Start: 2025-06-23 | End: 2025-07-14

## 2025-06-23 RX ORDER — OXYCODONE HYDROCHLORIDE 5 MG/1
10 TABLET ORAL EVERY 4 HOURS PRN
Status: DISCONTINUED | OUTPATIENT
Start: 2025-06-23 | End: 2025-06-23

## 2025-06-23 RX ORDER — ASPIRIN 81 MG/1
81 TABLET ORAL 2 TIMES DAILY
Qty: 60 TABLET | Refills: 0 | Status: SHIPPED | OUTPATIENT
Start: 2025-06-23 | End: 2025-07-23

## 2025-06-23 RX ORDER — ACETAMINOPHEN 10 MG/ML
1000 INJECTION, SOLUTION INTRAVENOUS ONCE
Status: DISCONTINUED | OUTPATIENT
Start: 2025-06-23 | End: 2025-06-23

## 2025-06-23 RX ORDER — KETOROLAC TROMETHAMINE 30 MG/ML
30 INJECTION, SOLUTION INTRAMUSCULAR; INTRAVENOUS ONCE
Status: DISCONTINUED | OUTPATIENT
Start: 2025-06-23 | End: 2025-06-23

## 2025-06-23 RX ORDER — ONDANSETRON 4 MG/1
8 TABLET, ORALLY DISINTEGRATING ORAL EVERY 8 HOURS PRN
Status: DISCONTINUED | OUTPATIENT
Start: 2025-06-23 | End: 2025-06-24 | Stop reason: HOSPADM

## 2025-06-23 RX ORDER — LIDOCAINE HYDROCHLORIDE 20 MG/ML
INJECTION, SOLUTION EPIDURAL; INFILTRATION; INTRACAUDAL; PERINEURAL
Status: DISCONTINUED | OUTPATIENT
Start: 2025-06-23 | End: 2025-06-23

## 2025-06-23 RX ORDER — DEXAMETHASONE SODIUM PHOSPHATE 4 MG/ML
INJECTION, SOLUTION INTRA-ARTICULAR; INTRALESIONAL; INTRAMUSCULAR; INTRAVENOUS; SOFT TISSUE
Status: DISCONTINUED | OUTPATIENT
Start: 2025-06-23 | End: 2025-06-23

## 2025-06-23 RX ORDER — NAPROXEN SODIUM 220 MG/1
325 TABLET, FILM COATED ORAL DAILY
Status: DISCONTINUED | OUTPATIENT
Start: 2025-06-24 | End: 2025-06-23

## 2025-06-23 RX ORDER — OXYCODONE AND ACETAMINOPHEN 10; 325 MG/1; MG/1
1 TABLET ORAL EVERY 6 HOURS PRN
Qty: 35 TABLET | Refills: 0 | Status: SHIPPED | OUTPATIENT
Start: 2025-06-23

## 2025-06-23 RX ORDER — FENTANYL CITRATE 50 UG/ML
INJECTION, SOLUTION INTRAMUSCULAR; INTRAVENOUS
Status: DISCONTINUED | OUTPATIENT
Start: 2025-06-23 | End: 2025-06-23

## 2025-06-23 RX ORDER — LOPERAMIDE HYDROCHLORIDE 2 MG/1
4 CAPSULE ORAL ONCE
Status: DISCONTINUED | OUTPATIENT
Start: 2025-06-23 | End: 2025-06-23

## 2025-06-23 RX ORDER — MEPERIDINE HYDROCHLORIDE 25 MG/ML
25 INJECTION INTRAMUSCULAR; INTRAVENOUS; SUBCUTANEOUS EVERY 10 MIN PRN
Status: DISCONTINUED | OUTPATIENT
Start: 2025-06-23 | End: 2025-06-23 | Stop reason: HOSPADM

## 2025-06-23 RX ORDER — CELECOXIB 100 MG/1
200 CAPSULE ORAL 2 TIMES DAILY
Status: DISCONTINUED | OUTPATIENT
Start: 2025-06-23 | End: 2025-06-23

## 2025-06-23 RX ORDER — MORPHINE SULFATE 10 MG/ML
4 INJECTION INTRAMUSCULAR; INTRAVENOUS; SUBCUTANEOUS EVERY 5 MIN PRN
Status: DISCONTINUED | OUTPATIENT
Start: 2025-06-23 | End: 2025-06-23 | Stop reason: HOSPADM

## 2025-06-23 RX ORDER — EPHEDRINE SULFATE 50 MG/ML
INJECTION, SOLUTION INTRAVENOUS
Status: DISCONTINUED | OUTPATIENT
Start: 2025-06-23 | End: 2025-06-23

## 2025-06-23 RX ORDER — AMOXICILLIN 250 MG
1 CAPSULE ORAL 2 TIMES DAILY
Qty: 60 TABLET | Refills: 0 | Status: SHIPPED | OUTPATIENT
Start: 2025-06-23

## 2025-06-23 RX ORDER — SODIUM CHLORIDE 0.9 % (FLUSH) 0.9 %
3 SYRINGE (ML) INJECTION EVERY 6 HOURS PRN
Status: DISCONTINUED | OUTPATIENT
Start: 2025-06-23 | End: 2025-06-24 | Stop reason: HOSPADM

## 2025-06-23 RX ORDER — TRAZODONE HYDROCHLORIDE 50 MG/1
50 TABLET ORAL NIGHTLY
Status: DISCONTINUED | OUTPATIENT
Start: 2025-06-23 | End: 2025-06-24 | Stop reason: HOSPADM

## 2025-06-23 RX ORDER — BISACODYL 10 MG/1
10 SUPPOSITORY RECTAL DAILY PRN
Status: DISCONTINUED | OUTPATIENT
Start: 2025-06-23 | End: 2025-06-24 | Stop reason: HOSPADM

## 2025-06-23 RX ORDER — CLINDAMYCIN PHOSPHATE 900 MG/50ML
900 INJECTION, SOLUTION INTRAVENOUS
Status: COMPLETED | OUTPATIENT
Start: 2025-06-23 | End: 2025-06-24

## 2025-06-23 RX ORDER — CEFAZOLIN 2 G/1
2 INJECTION, POWDER, FOR SOLUTION INTRAMUSCULAR; INTRAVENOUS
Status: DISPENSED | OUTPATIENT
Start: 2025-06-23 | End: 2025-06-24

## 2025-06-23 RX ORDER — MUPIROCIN 20 MG/G
1 OINTMENT TOPICAL 2 TIMES DAILY
Status: DISCONTINUED | OUTPATIENT
Start: 2025-06-23 | End: 2025-06-24 | Stop reason: HOSPADM

## 2025-06-23 RX ORDER — NAPROXEN SODIUM 220 MG/1
325 TABLET, FILM COATED ORAL DAILY
Status: DISCONTINUED | OUTPATIENT
Start: 2025-06-24 | End: 2025-06-24 | Stop reason: HOSPADM

## 2025-06-23 RX ORDER — SODIUM CHLORIDE, SODIUM LACTATE, POTASSIUM CHLORIDE, CALCIUM CHLORIDE 600; 310; 30; 20 MG/100ML; MG/100ML; MG/100ML; MG/100ML
125 INJECTION, SOLUTION INTRAVENOUS CONTINUOUS
Status: DISCONTINUED | OUTPATIENT
Start: 2025-06-23 | End: 2025-06-23

## 2025-06-23 RX ORDER — DOCUSATE SODIUM 100 MG/1
100 CAPSULE, LIQUID FILLED ORAL EVERY 12 HOURS
Status: DISCONTINUED | OUTPATIENT
Start: 2025-06-23 | End: 2025-06-23

## 2025-06-23 RX ORDER — PREGABALIN 75 MG/1
75 CAPSULE ORAL 2 TIMES DAILY
Status: DISCONTINUED | OUTPATIENT
Start: 2025-06-23 | End: 2025-06-24 | Stop reason: HOSPADM

## 2025-06-23 RX ORDER — ZOLPIDEM TARTRATE 5 MG/1
5 TABLET ORAL NIGHTLY PRN
Status: DISCONTINUED | OUTPATIENT
Start: 2025-06-23 | End: 2025-06-24 | Stop reason: HOSPADM

## 2025-06-23 RX ORDER — LOPERAMIDE HYDROCHLORIDE 2 MG/1
4 CAPSULE ORAL ONCE
Status: DISCONTINUED | OUTPATIENT
Start: 2025-06-23 | End: 2025-06-24 | Stop reason: HOSPADM

## 2025-06-23 RX ORDER — ONDANSETRON HYDROCHLORIDE 2 MG/ML
INJECTION, SOLUTION INTRAVENOUS
Status: DISCONTINUED | OUTPATIENT
Start: 2025-06-23 | End: 2025-06-23

## 2025-06-23 RX ORDER — OXYCODONE HYDROCHLORIDE 5 MG/1
5 TABLET ORAL EVERY 4 HOURS PRN
Status: DISCONTINUED | OUTPATIENT
Start: 2025-06-23 | End: 2025-06-24 | Stop reason: HOSPADM

## 2025-06-23 RX ORDER — CELECOXIB 100 MG/1
200 CAPSULE ORAL 2 TIMES DAILY
Status: DISCONTINUED | OUTPATIENT
Start: 2025-06-23 | End: 2025-06-24 | Stop reason: HOSPADM

## 2025-06-23 RX ORDER — ROPIVACAINE HYDROCHLORIDE 7.5 MG/ML
INJECTION, SOLUTION EPIDURAL; PERINEURAL
Status: COMPLETED | OUTPATIENT
Start: 2025-06-23 | End: 2025-06-23

## 2025-06-23 RX ORDER — CELECOXIB 200 MG/1
200 CAPSULE ORAL 2 TIMES DAILY
Qty: 60 CAPSULE | Refills: 0 | Status: SHIPPED | OUTPATIENT
Start: 2025-06-23

## 2025-06-23 RX ORDER — IPRATROPIUM BROMIDE AND ALBUTEROL SULFATE 2.5; .5 MG/3ML; MG/3ML
3 SOLUTION RESPIRATORY (INHALATION)
Status: DISCONTINUED | OUTPATIENT
Start: 2025-06-23 | End: 2025-06-23 | Stop reason: HOSPADM

## 2025-06-23 RX ORDER — BUPIVACAINE HYDROCHLORIDE 7.5 MG/ML
INJECTION, SOLUTION EPIDURAL; RETROBULBAR
Status: COMPLETED | OUTPATIENT
Start: 2025-06-23 | End: 2025-06-23

## 2025-06-23 RX ORDER — ONDANSETRON HYDROCHLORIDE 2 MG/ML
4 INJECTION, SOLUTION INTRAVENOUS DAILY PRN
Status: DISCONTINUED | OUTPATIENT
Start: 2025-06-23 | End: 2025-06-23 | Stop reason: HOSPADM

## 2025-06-23 RX ORDER — OXYCODONE HYDROCHLORIDE 5 MG/1
5 TABLET ORAL
Status: DISCONTINUED | OUTPATIENT
Start: 2025-06-23 | End: 2025-06-23 | Stop reason: HOSPADM

## 2025-06-23 RX ORDER — CLINDAMYCIN PHOSPHATE 900 MG/50ML
INJECTION, SOLUTION INTRAVENOUS
Status: DISCONTINUED | OUTPATIENT
Start: 2025-06-23 | End: 2025-06-23

## 2025-06-23 RX ORDER — ACETAMINOPHEN 500 MG
1000 TABLET ORAL EVERY 8 HOURS
Status: DISCONTINUED | OUTPATIENT
Start: 2025-06-23 | End: 2025-06-23

## 2025-06-23 RX ORDER — BISACODYL 10 MG/1
10 SUPPOSITORY RECTAL DAILY PRN
Status: DISCONTINUED | OUTPATIENT
Start: 2025-06-23 | End: 2025-06-23

## 2025-06-23 RX ORDER — OXYCODONE HYDROCHLORIDE 5 MG/1
10 TABLET ORAL EVERY 4 HOURS PRN
Status: DISCONTINUED | OUTPATIENT
Start: 2025-06-23 | End: 2025-06-24 | Stop reason: HOSPADM

## 2025-06-23 RX ORDER — FAMOTIDINE 20 MG/1
20 TABLET, FILM COATED ORAL 2 TIMES DAILY
Status: DISCONTINUED | OUTPATIENT
Start: 2025-06-23 | End: 2025-06-24 | Stop reason: HOSPADM

## 2025-06-23 RX ADMIN — ROPIVACAINE HYDROCHLORIDE 15 ML: 7.5 INJECTION, SOLUTION EPIDURAL; PERINEURAL at 01:06

## 2025-06-23 RX ADMIN — MUPIROCIN 1 G: 20 OINTMENT TOPICAL at 10:06

## 2025-06-23 RX ADMIN — ONDANSETRON 4 MG: 2 INJECTION INTRAMUSCULAR; INTRAVENOUS at 01:06

## 2025-06-23 RX ADMIN — FENTANYL CITRATE 75 MCG: 50 INJECTION INTRAMUSCULAR; INTRAVENOUS at 01:06

## 2025-06-23 RX ADMIN — LIDOCAINE HYDROCHLORIDE 100 MG: 20 INJECTION, SOLUTION EPIDURAL; INFILTRATION; INTRACAUDAL; PERINEURAL at 01:06

## 2025-06-23 RX ADMIN — SODIUM CHLORIDE, POTASSIUM CHLORIDE, SODIUM LACTATE AND CALCIUM CHLORIDE 125 ML/HR: 600; 310; 30; 20 INJECTION, SOLUTION INTRAVENOUS at 04:06

## 2025-06-23 RX ADMIN — EPHEDRINE SULFATE 25 MG: 50 INJECTION INTRAVENOUS at 01:06

## 2025-06-23 RX ADMIN — EPHEDRINE SULFATE 25 MG: 50 INJECTION INTRAVENOUS at 02:06

## 2025-06-23 RX ADMIN — ACETAMINOPHEN 1000 MG: 10 INJECTION INTRAVENOUS at 10:06

## 2025-06-23 RX ADMIN — VANCOMYCIN HYDROCHLORIDE 1500 MG: 1 INJECTION, POWDER, LYOPHILIZED, FOR SOLUTION INTRAVENOUS at 01:06

## 2025-06-23 RX ADMIN — DOCUSATE SODIUM 100 MG: 100 CAPSULE, LIQUID FILLED ORAL at 10:06

## 2025-06-23 RX ADMIN — EPINEPHRINE 0.1 MCG: 1 INJECTION INTRAMUSCULAR; INTRAVENOUS; SUBCUTANEOUS at 01:06

## 2025-06-23 RX ADMIN — PREGABALIN 75 MG: 75 CAPSULE ORAL at 10:06

## 2025-06-23 RX ADMIN — OXYCODONE HYDROCHLORIDE 5 MG: 5 TABLET ORAL at 10:06

## 2025-06-23 RX ADMIN — ACETAMINOPHEN 1000 MG: 10 INJECTION, SOLUTION INTRAVENOUS at 01:06

## 2025-06-23 RX ADMIN — TRANEXAMIC ACID 1000 MG: 100 INJECTION, SOLUTION INTRAVENOUS at 02:06

## 2025-06-23 RX ADMIN — FENTANYL CITRATE 25 MCG: 50 INJECTION INTRAMUSCULAR; INTRAVENOUS at 01:06

## 2025-06-23 RX ADMIN — DEXAMETHASONE SODIUM PHOSPHATE 4 MG: 4 INJECTION, SOLUTION INTRA-ARTICULAR; INTRALESIONAL; INTRAMUSCULAR; INTRAVENOUS; SOFT TISSUE at 01:06

## 2025-06-23 RX ADMIN — BUPIVACAINE HYDROCHLORIDE 1.25 ML: 7.5 INJECTION, SOLUTION EPIDURAL; RETROBULBAR at 01:06

## 2025-06-23 RX ADMIN — KETOROLAC TROMETHAMINE 30 MG: 30 INJECTION, SOLUTION INTRAMUSCULAR; INTRAVENOUS at 01:06

## 2025-06-23 RX ADMIN — TRANEXAMIC ACID 1000 MG: 100 INJECTION, SOLUTION INTRAVENOUS at 03:06

## 2025-06-23 RX ADMIN — CLINDAMYCIN IN 5 PERCENT DEXTROSE 900 MG: 18 INJECTION, SOLUTION INTRAVENOUS at 01:06

## 2025-06-23 RX ADMIN — SODIUM CHLORIDE: 9 INJECTION, SOLUTION INTRAVENOUS at 12:06

## 2025-06-23 RX ADMIN — CLINDAMYCIN PHOSPHATE 900 MG: 900 INJECTION, SOLUTION INTRAVENOUS at 10:06

## 2025-06-23 RX ADMIN — CELECOXIB 200 MG: 100 CAPSULE ORAL at 10:06

## 2025-06-23 RX ADMIN — MIDAZOLAM HYDROCHLORIDE 2 MG: 1 INJECTION, SOLUTION INTRAMUSCULAR; INTRAVENOUS at 01:06

## 2025-06-23 RX ADMIN — DEXAMETHASONE SODIUM PHOSPHATE 8 MG: 4 INJECTION, SOLUTION INTRA-ARTICULAR; INTRALESIONAL; INTRAMUSCULAR; INTRAVENOUS; SOFT TISSUE at 01:06

## 2025-06-23 RX ADMIN — FAMOTIDINE 20 MG: 20 TABLET, FILM COATED ORAL at 10:06

## 2025-06-23 RX ADMIN — PROPOFOL 200 MG: 10 INJECTION, EMULSION INTRAVENOUS at 01:06

## 2025-06-23 NOTE — ANESTHESIA PROCEDURE NOTES
Spinal    Diagnosis: rt tka  Patient location during procedure: OR  Start time: 6/23/2025 1:19 PM  Timeout: 6/23/2025 1:18 PM  End time: 6/23/2025 1:23 PM    Staffing  Authorizing Provider: Shaun Forrester MD  Performing Provider: Shaun Forrester MD    Staffing  Performed by: Shaun Forrester MD  Authorized by: Shaun Forrester MD    Preanesthetic Checklist  Completed: patient identified, IV checked, risks and benefits discussed, surgical consent, monitors and equipment checked, pre-op evaluation and timeout performed  Spinal Block  Patient position: sitting  Prep: Betadine  Patient monitoring: heart rate, continuous pulse ox, continuous capnometry and frequent blood pressure checks  Approach: midline  Location: L3-4  Injection technique: single shot  CSF Fluid: clear free-flowing CSF  Needle  Needle type: Quincke   Needle gauge: 22 G  Needle length: 4 in  Needle localization: anatomical landmarks  Assessment  Sensory level: T8   Dermatomal levels determined by alcohol wipe  Ease of block: easy  Patient's tolerance of the procedure: comfortable throughout block and no complaints  Medications:    Medications: BUPivacaine (pf) (MARCAINE) injection 0.75% - Intraspinal   1.25 mL - 6/23/2025 1:18:00 PM

## 2025-06-23 NOTE — PLAN OF CARE
[facility]  Initial Discharge Assessment       Primary Care Provider: Eugenie Segura MD    Admission Diagnosis: Osteoarthritis of right knee [M17.11]    Admission Date: 6/23/2025  Expected Discharge Date: 6/24/2025    Transition of Care Barriers: None    Payor: [employer]UE CROSS [facility] / Plan: [facility] / Product Type: Commercial /     Extended Emergency Contact Information  Primary Emergency Contact: Delma Johansen  Mobile Phone: [phone]  Relation: Daughter  Preferred language: English   needed? No    Discharge Plan A: Home with family, Home Health  Discharge Plan B: Home with family, Home Health      [facility] [address] [facility]  [address] [address]  Phone: [phone] Fax: [phone]    [facility]  [address] [place] [address]  Phone: [phone] Fax: [phone]    [facility] [address] [place], [place] [address] [address] [place], [place] [address]  Phone: [phone] Fax: [phone]      Initial Assessment (most recent)       Adult Discharge Assessment - 06/23/25 1509          Discharge Assessment    Assessment Type Discharge Planning Assessment     Confirmed/corrected address, phone number and insurance Yes     Confirmed Demographics Correct on Facesheet     Source of Information family     People in Home spouse     Name(s) of People in Home Mil Johansen, , [phone]     Do you expect to return to your current living situation? Yes     Do you have help at home or someone to help you manage your care at home? Yes     Who are your caregiver(s) and their phone number(s)? Mil Johansen, , [phone]     Prior to hospitilization cognitive status: Unable to Assess     Current cognitive status: Unable to Assess   in OR    Walking or Climbing Stairs Difficulty no     Dressing/Bathing Difficulty no     Home Accessibility not wheelchair  accessible;stairs to enter home     Number of Stairs, Main Entrance six     Stair Railings, Main Entrance railings safe and in good condition;railings on both sides of stairs     Home Layout Able to live on 1st floor     Equipment Currently Used at Home walker, rolling     Readmission within 30 days? No     Patient currently being followed by outpatient case management? No     Do you currently have service(s) that help you manage your care at home? No     Do you take prescription medications? Yes     Do you have prescription coverage? Yes     Do you have any problems affording any of your prescribed medications? No     Is the patient taking medications as prescribed? yes     Who is going to help you get home at discharge? Mil Johansen, , [phone]     How do you get to doctors appointments? car, drives self;family or friend will provide     Are you on dialysis? No     Do you take coumadin? No     Discharge Plan A Home with family;Home Health     Discharge Plan B Home with family;Home Health     DME Needed Upon Discharge  none     Discharge Plan discussed with: Spouse/sig other     Name(s) and Number(s) Mil Johansen, , [phone]     Transition of Care Barriers None        Physical Activity    On average, how many days per week do you engage in moderate to strenuous exercise (like a brisk walk)? 5 days     On average, how many minutes do you engage in exercise at this level? 30 min        Financial Resource Strain    How hard is it for you to pay for the very basics like food, housing, medical care, and heating? Not hard at all        Housing Stability    In the last 12 months, was there a time when you were not able to pay the mortgage or rent on time? No     At any time in the past 12 months, were you homeless or living in a shelter (including now)? No        Transportation Needs    In the past 12 months, has lack of transportation kept you from medical appointments or from getting medications? No      In the past 12 months, has lack of transportation kept you from meetings, work, or from getting things needed for daily living? No        Food Insecurity    Within the past 12 months, you worried that your food would run out before you got the money to buy more. Never true     Within the past 12 months, the food you bought just didn't last and you didn't have money to get more. Never true        Stress    Do you feel stress - tense, restless, nervous, or anxious, or unable to sleep at night because your mind is troubled all the time - these days? Not at all        Social Isolation    How often do you feel lonely or isolated from those around you?  Never        Alcohol Use    Q1: How often do you have a drink containing alcohol? Never     Q2: How many drinks containing alcohol do you have on a typical day when you are drinking? Patient does not drink     Q3: How often do you have six or more drinks on one occasion? Never        Utilities    In the past 12 months has the electric, gas, oil, or water company threatened to shut off services in your home? No        Health Literacy    How often do you need to have someone help you when you read instructions, pamphlets, or other written material from your doctor or pharmacy? Rarely                   Ss spoke with pt's family at bedside due to pt being in OR at time of initial assessment. Pt lives at home with her  and plans to return when medically ready. Pt has rw. Pt will need hh, choice obtained for corinne Ricci faxed referral. SDOH completed. Ss following    Met with pt's  to review discharge recommendation of hh and is agreeable to plan    Patient/family provided list of facilities in-network with patient's payor plan. Providers that are owned, operated, or affiliated with [facility] are included on the list.     Notified that referral sent to below listed facilities from in-network list based on proximity to home/family support:    [facility]  2.  3.  4.  5. (can send more than 5)    Patient/family instructed to identify preference.    Preferred Facility: (if more than 1, listed in order of descending preference)  1.  OR  Patient has declined to select a preferred provider and elects placement with the first accepting in network provider that is available to provide services as ordered by the physician       If an additional preferred facility not listed above is identified, additional referral to be sent. If above facilities unable to accept, will send additional referrals to in-network providers.

## 2025-06-23 NOTE — TRANSFER OF CARE
"Anesthesia Transfer of Care Note    Patient: Mary Johansen    Procedure(s) Performed: Procedure(s) (LRB):  ROBOTIC ARTHROPLASTY, KNEE, TOTAL (Right)    Patient location: PACU    Anesthesia Type: general, regional and spinal    Transport from OR: Transported from OR on 100% O2 by closed face mask with adequate spontaneous ventilation    Post pain: adequate analgesia    Post assessment: no apparent anesthetic complications    Post vital signs: stable    Level of consciousness: responds to stimulation    Nausea/Vomiting: no nausea/vomiting    Complications: none    Transfer of care protocol was followed      Last vitals: Visit Vitals  /84   Pulse 94   Temp 36.4 °C (97.5 °F) (Oral)   Resp 16   Ht 5' 11" (1.803 m)   Wt 103 kg (227 lb)   SpO2 100%   Breastfeeding No   BMI 31.66 kg/m²     "

## 2025-06-23 NOTE — ANESTHESIA PROCEDURE NOTES
Intubation    Date/Time: 6/23/2025 1:29 PM    Performed by: Hossein Herrera CRNA  Authorized by: Shaun Forrester MD    Intubation:     Induction:  Intravenous    Intubated:  Postinduction    Mask Ventilation:  Easy with oral airway    Attempts:  1    Attempted By:  CRNA    Difficult Airway Encountered?: No      Complications:  None    Airway Device:  Supraglottic airway/LMA    Airway Device Size:  4.0    Style/Cuff Inflation:  Cuffed    Placement Verified By:  Capnometry    Complicating Factors:  None    Findings Post-Intubation:  BS equal bilateral

## 2025-06-23 NOTE — OP NOTE
Department of Orthopedic Surgery    Operative Note  NAME: Mary Johansen    : 1970      DATE: 2025      PREOPERATIVE DIAGNOSIS: Osteoarthritis of right knee [M17.11]    POSTOPERATIVE DIAGNOSIS:  Osteoarthritis of right knee [M17.11]    PROCEDURE: right Total Knee Arthroplasty (62889) with Robotic assitance ()    SURGEON: Jg Driscoll    ASSISTANT: Nickolas Harper    ANESTHESIA: Spinal + adductor canal block    BLOOD LOSS: 35 cc    TOURNIQUET TIME:  68 mins    DRAINS: None    IMPLANTS:   Implant Name Type Inv. Item Serial No.  Lot No. LRB No. Used Action   PIN VELYS ARRAY DRILL [id number] - [id number]  PIN VELYS ARRAY DRILL [id number]  [employer]  Right 2 Implanted and Explanted   PIN VELYS ARRAY DRILL [id number] - [id number]  PIN VELYS ARRAY DRILL [id number]  [employer]  Right 1 Implanted and Explanted   PIN VELYS ARRAY DRILL [id number] - [id number]  PIN VELYS ARRAY DRILL [id number]  [employer]  Right 1 Implanted and Explanted   BASEPLATE ATTUNE TIB RP SZ4 - [id number]  BASEPLATE ATTUNE TIB RP SZ4  [employer] [id number] Right 1 Implanted   COMP ATTUNE POROCOAT R SZ4 - [id number]  COMP ATTUNE POROCOAT R SZ4  [employer] [id number] Right 1 Implanted   PATELLA ATTUNE MEDLZD DOME 35 - [id number]  PATELLA ATTUNE MEDLZD DOME 35  [employer] [id number] Right 1 Implanted   CEMENT BONE SPEED SET - [id number]  CEMENT BONE SPEED SET  [employer] FERN. [id number] Right 1 Implanted   INSERT ATTUNE CR RP SZ4 6MM - [id number]  INSERT ATTUNE CR RP SZ4 6MM  [employer] [id number] Right 1 Implanted       Attune Pressfit Tibial Baseplate, Cruciate Retaining, Size 4  Attune Press Fit Femoral Component, Size 4  Attune Rotating Platform, Cruciate retaining 6 polyethylene spacer   Attune Size 35 Medialized dome patella button     INDICATIONS FOR PROCEDURE:   [unfilled] is a 54 y.o.-year-old with debilitating right-sided knee pain despite nonoperative measures and interested in knee  arthroplasty.    PROCEDURE IN DETAIL:  After obtaining informed consent and starting the patient on preoperative IV antibiotics, the patient was taken back to the Operating   Room. Anesthesia was performed by Anesthesia Team. The right lower  extremity was prepped and draped in normal sterile fashion.  An Esmarch bandage was used to exsanguinate the affected lower extremity and the pneumatic tourniquet  was inflated to 300 mmHg.  A standard longitudinal midline incision was made beginning 3 fingerbreadths above the superior pole of the patella extending down to the tibial tubercle.  Full-thickness skin flaps were raised.  A medial parapatellar arthrotomy was made. The patella was then everted.    A release of the proximal medial tibia and MCL was then undertaken.  The infrapatella fat pad was resected. At this point guide pins were placed along the proximal tibia and distal femur.  A utilized [employer] robotic assistance in this case and took the knee and hip through an arc of motion to find the center of the hip.  Way points were then marked along the tibia and femur.  After the appropriate registration was performed robotic assistant was utilized to create distal femoral and proximal tibial cuts.  An extension block was used to verify appropriate alignment of the joint at this point.  I then utilized a tensioner in order to verify the appropriate tension within the flexion gap and selected the appropriate parameters for rotation of the femoral component.  The femoral cuts were then performed at this point making anterior posterior and chamfer cuts appropriately.  Trials were then positioned on the femur and a floating trial was placed in the tibia and the knee was taken through a range of motion and satisfactory stability and alignment was able to be achieved.   Trial components were placed on the femur and tibia.   Flexion and extension gaps were symmetric and balanced at this point.    The trial components  demonstrated proper range of motion and stability.    The patella was addressed next.  A guide was utilized to resect 9.5 mm of bone.  A drill guide was applied to flat patella surface and appropriate drill holes were made.  A trial patella button was placed.  The patella tracked nicely.  No lateral release was required.  We then Pulsavac irrigated cancellous bone and vacuum mixed the cement.      The final components were then implanted.  The tibial component was placed first, followed by the femoral component.  A trial polyethylene was placed.  The cemented patella was then placed and excess cement was removed.   Once the cement hardened,the tourniquet was released.  Bleeders were coagulated.  1 gram of TXA was given at this point. The appropriate dose of pericapsular injection was then administered, focusing on the posterior capsule.  Final polyethylene component was placed.  Satisfactory range of motion and stability was demonstrated.    The extensor retinaculum was then clsoed with #1 Vicryl figure of eight sutures with the knee in slight flexion, the subcutaneous tissue with 2-0 Vicryl, skin reapproximated with staples.  A sterile dressing was applied, as was a knee immobilizer.  Patient was then taken to the recovery room in stable condition.

## 2025-06-23 NOTE — ANESTHESIA PROCEDURE NOTES
Peripheral Block    Patient location during procedure: OR   Block not for primary anesthetic.  Reason for block: at surgeon's request and post-op pain management   Post-op Pain Location: rt knee pain   Start time: 6/23/2025 1:25 PM  Timeout: 6/23/2025 1:24 PM   End time: 6/23/2025 1:28 PM    Staffing  Authorizing Provider: Shaun Forrester MD  Performing Provider: Shaun Forrester MD    Staffing  Performed by: Shaun Forrester MD  Authorized by: Shaun Forrester MD    Preanesthetic Checklist  Completed: patient identified, IV checked, site marked, risks and benefits discussed, surgical consent, monitors and equipment checked, pre-op evaluation and timeout performed  Peripheral Block  Patient position: supine  Prep: ChloraPrep  Patient monitoring: heart rate, cardiac monitor, continuous pulse ox, continuous capnometry and frequent blood pressure checks  Block type: adductor canal  Laterality: right  Injection technique: single shot  Needle  Needle type: Stimuplex   Needle gauge: 20 G  Needle length: 4 in  Needle localization: ultrasound guidance   -ultrasound image captured on disc.  Assessment  Injection assessment: negative aspiration, negative parasthesia and local visualized surrounding nerve  Paresthesia pain: none  Heart rate change: no  Slow fractionated injection: yes  Pain Tolerance: comfortable throughout block and no complaints  Medications:    Medications: ROPIvacaine (NAROPIN) injection 0.75% - Perineural   15 mL - 6/23/2025 1:25:00 PM

## 2025-06-23 NOTE — H&P
[facility] - Orthopedic Periop Services  Orthopedics  H&P    Patient Name: Mary Johansen  MRN: [id number]  Admission Date: 6/23/2025  Primary Care Provider: Eugenie Segura MD    Patient information was obtained from patient.     Subjective:     Principal Problem:<principal problem not specified>    Chief Complaint: No chief complaint on file.       HPI:  To OR for right total knee arthroplasty    Past Medical History:   Diagnosis Date    Hypertension        Past Surgical History:   Procedure Laterality Date    ARTHROSCOPIC REPAIR OF ROTATOR CUFF OF SHOULDER Right 7/19/2023    Procedure: REPAIR, ROTATOR CUFF, ARTHROSCOPIC;  Surgeon: Jg Driscoll MD;  Location: [facility] OR;  Service: Orthopedics;  Laterality: Right;    ARTHROSCOPY OF SHOULDER WITH DECOMPRESSION OF SUBACROMIAL SPACE Right 7/19/2023    Procedure: ARTHROSCOPY, SHOULDER, WITH SUBACROMIAL SPACE DECOMPRESSION;  Surgeon: gJ Driscoll MD;  Location: [facility] OR;  Service: Orthopedics;  Laterality: Right;    ARTHROSCOPY OF SHOULDER WITH REMOVAL OF DISTAL CLAVICLE Right 7/19/2023    Procedure: ARTHROSCOPY, SHOULDER, WITH DISTAL CLAVICLE EXCISION;  Surgeon: Jg Driscoll MD;  Location: [facility] OR;  Service: Orthopedics;  Laterality: Right;  POSSIBLE REGENETEN PATCH AUGMENTATION    ARTHROSCOPY,SHOULDER,WITH BICEPS TENODESIS Right 7/19/2023    Procedure: ARTHROSCOPY,SHOULDER,WITH BICEPS TENODESIS;  Surgeon: Jg Driscoll MD;  Location: [facility] OR;  Service: Orthopedics;  Laterality: Right;    HYSTERECTOMY  2005    TONSILLECTOMY         Review of patient's allergies indicates:   Allergen Reactions    Sulfa (sulfonamide antibiotics) Rash    Pcn [penicillins] Rash       Current Facility-Administered Medications   Medication    0.9% NaCl infusion     Family History       Problem Relation (Age of Onset)    Arthritis Mother    Hypertension Mother, Father    Kidney disease Father          Tobacco Use    Smoking status: Never     "Smokeless tobacco: Never   Substance and Sexual Activity    Alcohol use: Not Currently    Drug use: Never    Sexual activity: Not on file     Review of Systems   Constitutional: Negative for chills and decreased appetite.   Cardiovascular:  Negative for chest pain and palpitations.   Respiratory:  Negative for cough and shortness of breath.    Gastrointestinal:  Negative for abdominal pain.   Neurological:  Negative for focal weakness, numbness, sensory change and weakness.   Psychiatric/Behavioral:  Negative for altered mental status.      Objective:     Vital Signs (Most Recent):  Temp: 98 °F (36.7 °C) (06/23/25 1100)  Pulse: 65 (06/23/25 1100)  Resp: 18 (06/23/25 1100)  BP: (!) 174/91 (06/23/25 1100)  SpO2: 100 % (06/23/25 1100) Vital Signs (24h Range):  Temp:  [98 °F (36.7 °C)] 98 °F (36.7 °C)  Pulse:  [65] 65  Resp:  [18] 18  SpO2:  [100 %] 100 %  BP: (174)/(91) 174/91     Weight: 103 kg (227 lb)  Height: 5' 11" (180.3 cm)  Body mass index is 31.66 kg/m².    No intake or output data in the 24 hours ending 06/23/25 1153    General    Nursing note and vitals reviewed.  Constitutional: She is oriented to person, place, and time. She appears well-developed and well-nourished.   HENT:   Head: Normocephalic and atraumatic.   Nose: Nose normal.   Eyes: Pupils are equal, round, and reactive to light.   Neck: Neck supple.   Cardiovascular:  Normal rate, regular rhythm and intact distal pulses.            Pulmonary/Chest: Effort normal. No respiratory distress. She exhibits no tenderness.   Abdominal: Soft. She exhibits no distension. There is no abdominal tenderness.   Neurological: She is alert and oriented to person, place, and time. She has normal reflexes.   Psychiatric: She has a normal mood and affect. Her behavior is normal. Judgment and thought content normal.     General Musculoskeletal Exam   Gait: antalgic       Right Knee Exam     Inspection   Swelling: present    Tenderness   The patient is tender to " palpation of the medial joint line and lateral joint line.    Crepitus   The patient has crepitus of the patella, medial joint line and lateral joint line.    Range of Motion   Extension:  abnormal   Flexion:  abnormal     Tests   Meniscus   Ovidio:  Medial - positive   Ligament Examination   Lachman: normal (-1 to 2mm)   PCL-Posterior Drawer: normal (0 to 2mm)     MCL - Valgus: normal (0 to 2mm)  LCL - Varus: normal  Pivot Shift: normal (Equal)  Reverse Pivot Shift: normal (Equal)  Dial Test at 30 degrees: normal (< 5 degrees)  Dial Test at 90 degrees: normal (< 5 degrees)  Posterolateral Corner: stable  Patella   Patellar Tracking: normal  Q-Angle at 90 degrees: normal  Patellar Grind: positive    Other   Sensation: normal    Muscle Strength   Right Lower Extremity   Quadriceps:  5/5   Hamstrin/5     Reflexes     Right Side   Quadriceps:  2+    Vascular Exam     Right Pulses  Dorsalis Pedis:      2+  Posterior Tibial:      2+          Significant Labs:   Recent Lab Results       None          All pertinent labs within the past 24 hours have been reviewed.    Significant Imaging: I have reviewed and interpreted all pertinent imaging results/findings.    Assessment/Plan:     There are no hospital problems to display for this patient.    To OR for right total knee arthroplasty    Jg Driscoll MD  Orthopedics  [facility] - Orthopedic [facility]iop [facility]

## 2025-06-23 NOTE — PT/OT/SLP EVAL
"Physical Therapy Evaluation    Patient Name:  Mary Johansen   MRN:  [id number]    Recommendations:     Discharge Recommendations: Low Intensity Therapy   Discharge Equipment Recommendations: none   Barriers to discharge: None    Assessment:     Mary Johansen is a 54 y.o. female admitted with a medical diagnosis of <principal problem not specified>.  She presents with the following impairments/functional limitations: decreased ROM, impaired balance, orthopedic precautions, decreased lower extremity function, weakness. Patient presents with controlled pain post op. Patient appears to have no control of the RLE. Patient presents with decreased functional mobility and unable to ambulate. Patient is currently not safe to return home due to spinal block and presence of stairs in the home. Patient will stay over night and be reassessed tomorrow. Physician made aware of changes.     Rehab Prognosis: Good; patient would benefit from acute skilled PT services to address these deficits and reach maximum level of function.    Recent Surgery: Procedure(s) (LRB):  ROBOTIC ARTHROPLASTY, KNEE, TOTAL (Right) * Day of Surgery *    Plan:     During this hospitalization, patient to be seen 1 x/week to address the identified rehab impairments via gait training, therapeutic activities, therapeutic exercises and progress toward the following goals:    Plan of Care Expires:  07/23/25    Subjective     Chief Complaint: nerve block  Patient/Family Comments/goals: Patient reports lack of control over RLE and bladder. Patient notes that she is unable to walk at this time due to "lack of feeling and tingling sensations in the leg".   Pain/Comfort:  Pain Rating Post-Intervention 1: 0/10  Location - Side 2: Right  Location 2: knee  Pain Addressed 2: Pre-medicate for activity, Cessation of Activity    Patients cultural, spiritual, Christian conflicts given the current situation: no    Living Environment:  Patient lives at in a home " with 3 steps. Patient will have assistance from daughter and sister in law.   Prior to admission, patients level of function was independent.  Equipment used at home: none.  DME owned (not currently used): none.  Upon discharge, patient will have assistance from daughter and sister-in-law.    Objective:   Patient found HOB elevated with blood pressure cuff, pulse ox (continuous), peripheral IV, SCD  upon PT entry to room.    General Precautions: Standard, fall  Orthopedic Precautions:RLE weight bearing as tolerated   Braces: Knee immobilizer  Respiratory Status: Room air    Exams:  Sensation:    -       Impaired  Moderately impaired bilateral extremities   RLE ROM: NT due to knee immobilizer   RLE Strength: 2/5 MMT for RLE   LLE ROM: WNL  LLE Strength: WNL    Functional Mobility:  Bed Mobility:     Supine to Sit: moderate assistance  Transfers:     Sit to Stand:  moderate assistance with hand-held assist  Gait: Patient unable to ambulate.       AM-PAC 6 CLICK MOBILITY  Total Score:11       Treatment & Education:  Tx plan   Spinal block education     Patient left HOB elevated with family present.    GOALS:   Multidisciplinary Problems       Physical Therapy Goals       Not on file                    DME Justifications:  No DME recommended requiring DME justifications    History:     Past Medical History:   Diagnosis Date    Hypertension        Past Surgical History:   Procedure Laterality Date    ARTHROSCOPIC REPAIR OF ROTATOR CUFF OF SHOULDER Right 7/19/2023    Procedure: REPAIR, ROTATOR CUFF, ARTHROSCOPIC;  Surgeon: Jg Driscoll MD;  Location: [facility];  Service: Orthopedics;  Laterality: Right;    ARTHROSCOPY OF SHOULDER WITH DECOMPRESSION OF SUBACROMIAL SPACE Right 7/19/2023    Procedure: ARTHROSCOPY, SHOULDER, WITH SUBACROMIAL SPACE DECOMPRESSION;  Surgeon: Jg Driscoll MD;  Location: [facility] OR;  Service: Orthopedics;  Laterality: Right;    ARTHROSCOPY OF SHOULDER WITH REMOVAL OF DISTAL  CLAVICLE Right 7/19/2023    Procedure: ARTHROSCOPY, SHOULDER, WITH DISTAL CLAVICLE EXCISION;  Surgeon: Jg Driscoll MD;  Location: [facility] OR;  Service: Orthopedics;  Laterality: Right;  POSSIBLE REGENETEN PATCH AUGMENTATION    ARTHROSCOPY,SHOULDER,WITH BICEPS TENODESIS Right 7/19/2023    Procedure: ARTHROSCOPY,SHOULDER,WITH BICEPS TENODESIS;  Surgeon: Jg Driscoll MD;  Location: [facility] OR;  Service: Orthopedics;  Laterality: Right;    HYSTERECTOMY  2005    TONSILLECTOMY         Time Tracking:     PT Received On: 06/23/25  PT Start Time: 1650     PT Stop Time: 1710  PT Total Time (min): 20 min     Billable Minutes: Evaluation 20 06/23/2025

## 2025-06-24 VITALS
BODY MASS INDEX: 31.78 KG/M2 | TEMPERATURE: 98 F | DIASTOLIC BLOOD PRESSURE: 79 MMHG | WEIGHT: 227 LBS | HEIGHT: 71 IN | HEART RATE: 71 BPM | OXYGEN SATURATION: 96 % | RESPIRATION RATE: 18 BRPM | SYSTOLIC BLOOD PRESSURE: 122 MMHG

## 2025-06-24 LAB
ANION GAP SERPL CALCULATED.3IONS-SCNC: 12 MMOL/L (ref 7–16)
BASOPHILS # BLD AUTO: 0.01 K/UL (ref 0–0.2)
BASOPHILS NFR BLD AUTO: 0.1 % (ref 0–1)
BUN SERPL-MCNC: 17 MG/DL (ref 10–20)
BUN/CREAT SERPL: 18 (ref 6–20)
CALCIUM SERPL-MCNC: 8.5 MG/DL (ref 8.4–10.2)
CHLORIDE SERPL-SCNC: 109 MMOL/L (ref 98–107)
CO2 SERPL-SCNC: 23 MMOL/L (ref 22–29)
CREAT SERPL-MCNC: 0.92 MG/DL (ref 0.55–1.02)
DIFFERENTIAL METHOD BLD: ABNORMAL
EGFR (NO RACE VARIABLE) (RUSH/TITUS): 73 ML/MIN/1.73M2
EOSINOPHIL # BLD AUTO: 0 K/UL (ref 0–0.5)
EOSINOPHIL NFR BLD AUTO: 0 % (ref 1–4)
ERYTHROCYTE [DISTWIDTH] IN BLOOD BY AUTOMATED COUNT: 14.1 % (ref 11.5–14.5)
GLUCOSE SERPL-MCNC: 162 MG/DL (ref 74–100)
HCT VFR BLD AUTO: 34.4 % (ref 38–47)
HGB BLD-MCNC: 10.8 G/DL (ref 12–16)
IMM GRANULOCYTES # BLD AUTO: 0.03 K/UL (ref 0–0.04)
IMM GRANULOCYTES NFR BLD: 0.3 % (ref 0–0.4)
LYMPHOCYTES # BLD AUTO: 0.57 K/UL (ref 1–4.8)
LYMPHOCYTES NFR BLD AUTO: 5.9 % (ref 27–41)
MCH RBC QN AUTO: 27.7 PG (ref 27–31)
MCHC RBC AUTO-ENTMCNC: 31.4 G/DL (ref 32–36)
MCV RBC AUTO: 88.2 FL (ref 80–96)
MONOCYTES # BLD AUTO: 0.43 K/UL (ref 0–0.8)
MONOCYTES NFR BLD AUTO: 4.5 % (ref 2–6)
MPC BLD CALC-MCNC: 11.5 FL (ref 9.4–12.4)
NEUTROPHILS # BLD AUTO: 8.61 K/UL (ref 1.8–7.7)
NEUTROPHILS NFR BLD AUTO: 89.2 % (ref 53–65)
NRBC # BLD AUTO: 0 X10E3/UL
NRBC, AUTO (.00): 0 %
PLATELET # BLD AUTO: 210 K/UL (ref 150–400)
POTASSIUM SERPL-SCNC: 3.8 MMOL/L (ref 3.5–5.1)
RBC # BLD AUTO: 3.9 M/UL (ref 4.2–5.4)
SODIUM SERPL-SCNC: 140 MMOL/L (ref 136–145)
WBC # BLD AUTO: 9.65 K/UL (ref 4.5–11)

## 2025-06-24 PROCEDURE — 97110 THERAPEUTIC EXERCISES: CPT

## 2025-06-24 PROCEDURE — 25000003 PHARM REV CODE 250: Performed by: ORTHOPAEDIC SURGERY

## 2025-06-24 PROCEDURE — 63600175 PHARM REV CODE 636 W HCPCS: Performed by: ORTHOPAEDIC SURGERY

## 2025-06-24 PROCEDURE — 94761 N-INVAS EAR/PLS OXIMETRY MLT: CPT

## 2025-06-24 PROCEDURE — 97165 OT EVAL LOW COMPLEX 30 MIN: CPT

## 2025-06-24 PROCEDURE — 80048 BASIC METABOLIC PNL TOTAL CA: CPT | Performed by: ORTHOPAEDIC SURGERY

## 2025-06-24 PROCEDURE — 25000003 PHARM REV CODE 250: Performed by: INTERNAL MEDICINE

## 2025-06-24 PROCEDURE — 36415 COLL VENOUS BLD VENIPUNCTURE: CPT | Performed by: ORTHOPAEDIC SURGERY

## 2025-06-24 PROCEDURE — 85025 COMPLETE CBC W/AUTO DIFF WBC: CPT | Performed by: ORTHOPAEDIC SURGERY

## 2025-06-24 PROCEDURE — 97116 GAIT TRAINING THERAPY: CPT

## 2025-06-24 RX ORDER — LISINOPRIL 10 MG/1
10 TABLET ORAL DAILY
Status: DISCONTINUED | OUTPATIENT
Start: 2025-06-24 | End: 2025-06-24 | Stop reason: HOSPADM

## 2025-06-24 RX ADMIN — DOCUSATE SODIUM 100 MG: 100 CAPSULE, LIQUID FILLED ORAL at 09:06

## 2025-06-24 RX ADMIN — ACETAMINOPHEN 1000 MG: 500 TABLET ORAL at 06:06

## 2025-06-24 RX ADMIN — CLINDAMYCIN PHOSPHATE 900 MG: 900 INJECTION, SOLUTION INTRAVENOUS at 06:06

## 2025-06-24 RX ADMIN — CELECOXIB 200 MG: 100 CAPSULE ORAL at 09:06

## 2025-06-24 RX ADMIN — CEFAZOLIN 2 G: 2 INJECTION, POWDER, FOR SOLUTION INTRAMUSCULAR; INTRAVENOUS at 12:06

## 2025-06-24 RX ADMIN — PREGABALIN 75 MG: 75 CAPSULE ORAL at 09:06

## 2025-06-24 RX ADMIN — FAMOTIDINE 20 MG: 20 TABLET, FILM COATED ORAL at 09:06

## 2025-06-24 RX ADMIN — LISINOPRIL 10 MG: 10 TABLET ORAL at 09:06

## 2025-06-24 RX ADMIN — ASPIRIN 81 MG CHEWABLE TABLET 324 MG: 81 TABLET CHEWABLE at 09:06

## 2025-06-24 RX ADMIN — OXYCODONE HYDROCHLORIDE 5 MG: 5 TABLET ORAL at 06:06

## 2025-06-24 NOTE — PLAN OF CARE
[facility] - Orthopedic  Discharge Final Note    Primary Care Provider: Eugenie Segura MD    Expected Discharge Date: 6/24/2025    Final Discharge Note (most recent)       Final Note - 06/24/25 1337          Final Note    Assessment Type Final Discharge Note     Anticipated Discharge Disposition Home-Health Care [facility]     What phone number can be called within the next 1-3 days to see how you are doing after discharge? [phone]        Post-Acute Status    Post-Acute Authorization Home Health     Home Health Status Set-up Complete/Auth obtained     Patient choice form signed by patient/caregiver List with quality metrics by geographic area provided;List from CMS Compare;List from System Post-Acute Care     Discharge Delays None known at this time                    Follow-up providers       Jg Driscoll MD   Specialty: Orthopedic Surgery    [address]  [facility] [place] [address]   Phone: [phone]       Next Steps: Follow up on 7/8/2025    Instructions: Post-OP Surgery Follow up Appointment with Minerva Marrero NP at 3pm              After-discharge care                [facility]   Service: Home Rehabilitation, Home Nursing    [address] [facility] [place] [address]   Phone: [phone]                             Pt dc home with family and hh through [facility]. Dc paperwork faxed. No further needs noted

## 2025-06-24 NOTE — SUBJECTIVE & OBJECTIVE
Interval History:     Review of Systems   Constitutional:  Negative for chills and unexpected weight change.   HENT:  Negative for congestion, drooling, ear pain and sore throat.    Eyes:  Negative for visual disturbance.   Respiratory:  Negative for cough and shortness of breath.    Cardiovascular:  Negative for chest pain, palpitations and leg swelling.   Gastrointestinal:  Negative for abdominal pain, nausea and vomiting.   Genitourinary:  Positive for frequency. Negative for difficulty urinating, dysuria and urgency.   Musculoskeletal:  Positive for arthralgias (from TKR) and myalgias. Negative for back pain.   Skin:  Positive for wound (from TKR). Negative for rash.   Neurological:  Positive for weakness. Negative for dizziness, syncope and headaches.   Psychiatric/Behavioral:  Negative for sleep disturbance.      Objective:     Vital Signs (Most Recent):  Temp: 97.5 °F (36.4 °C) (06/24/25 0753)  Pulse: 71 (06/24/25 0753)  Resp: 18 (06/24/25 0753)  BP: 122/79 (06/24/25 0753)  SpO2: 96 % (06/24/25 0753) Vital Signs (24h Range):  Temp:  [97.5 °F (36.4 °C)-98 °F (36.7 °C)] 97.5 °F (36.4 °C)  Pulse:  [63-97] 71  Resp:  [15-22] 18  SpO2:  [93 %-100 %] 96 %  BP: ()/(63-91) 122/79     Weight: 103 kg (227 lb)  Body mass index is 31.66 kg/m².    Intake/Output Summary (Last 24 hours) at 6/24/2025 1152  Last data filed at 6/23/2025 1812  Gross per 24 hour   Intake 370 ml   Output --   Net 370 ml         Physical Exam  Constitutional:       Appearance: Normal appearance. She is obese.   HENT:      Head: Normocephalic and atraumatic.      Right Ear: External ear normal.      Left Ear: External ear normal.      Nose: Nose normal.      Mouth/Throat:      Mouth: Mucous membranes are moist.      Pharynx: No oropharyngeal exudate or posterior oropharyngeal erythema.   Eyes:      Extraocular Movements: Extraocular movements intact.      Conjunctiva/sclera: Conjunctivae normal.      Pupils: Pupils are equal, round, and  reactive to light.   Cardiovascular:      Rate and Rhythm: Normal rate and regular rhythm.      Pulses: Normal pulses.      Heart sounds: Normal heart sounds.   Pulmonary:      Effort: Pulmonary effort is normal.      Breath sounds: Normal breath sounds.   Abdominal:      General: Bowel sounds are normal.      Palpations: Abdomen is soft.   Musculoskeletal:         General: Tenderness present. Normal range of motion.      Cervical back: Normal range of motion and neck supple.   Skin:     General: Skin is warm and dry.      Capillary Refill: Capillary refill takes less than 2 seconds.      Comments: Right Leg wrapped from Total Knee Replacement   Neurological:      General: No focal deficit present.      Mental Status: She is alert and oriented to person, place, and time. Mental status is at baseline.   Psychiatric:         Mood and Affect: Mood normal.         Behavior: Behavior normal.               Significant Labs: All pertinent labs within the past 24 hours have been reviewed.    Significant Imaging: I have reviewed all pertinent imaging results/findings within the past 24 hours.

## 2025-06-24 NOTE — PT/OT/SLP PROGRESS
"Physical Therapy Treatment    Patient Name:  Mary Johansen   MRN:  [id number]    Recommendations:     Discharge Recommendations: Low Intensity Therapy  Discharge Equipment Recommendations: none  Barriers to discharge: None    Assessment:     Mary Johansen is a 54 y.o. female admitted with a medical diagnosis of Unilateral primary osteoarthritis, right knee.  She presents with the following impairments/functional limitations: decreased ROM, impaired balance, orthopedic precautions, decreased lower extremity function, weakness. Patient presents with controlled pain this morning. Patient also presents with residual effects from spinal block. Patient presents with good functional mobility in the RLE. Patient also appears to successfully manage ambulation with RW and stair negotiation. Patient appears good to go home with family from a PT standpoint.     Rehab Prognosis: Good; patient would benefit from acute skilled PT services to address these deficits and reach maximum level of function.    Recent Surgery: Procedure(s) (LRB):  ROBOTIC ARTHROPLASTY, KNEE, TOTAL (Right) 1 Day Post-Op    Plan:     During this hospitalization, patient to be seen 1 x/week to address the identified rehab impairments via gait training, therapeutic activities, therapeutic exercises and progress toward the following goals:    Plan of Care Expires:  07/23/25    Subjective     Chief Complaint: spinal block   Patient/Family Comments/goals: Patient reports that she is going to "work hard at physical therapy". Patient also reports that the spinal block wore off last night, but still feels some effects from it that makes the RLE muscles feel weak.   Pain/Comfort:  Pain Rating 1: 0/10  Location - Side 1: Right  Location 1: knee  Pain Addressed 1: Pre-medicate for activity, Cessation of Activity      Objective:     Patient found HOB elevated with blood pressure cuff, pulse ox (continuous), peripheral IV, SCD upon PT entry to room. "     General Precautions: Standard, fall  Orthopedic Precautions: RLE weight bearing as tolerated  Braces: Knee immobilizer  Respiratory Status: Room air     Functional Mobility:  Bed Mobility:     Supine to Sit: contact guard assistance  Transfers:     Sit to Stand:  contact guard assistance with rolling walker  Gait: Patient was able to ambulate 60 feet with rolling walker and cga. Patient was also able to negotiatie 2 stairs with cga and railing.       AM-PAC 6 CLICK MOBILITY  Turning over in bed (including adjusting bedclothes, sheets and blankets)?: 4  Sitting down on and standing up from a chair with arms (e.g., wheelchair, bedside commode, etc.): 3  Moving from lying on back to sitting on the side of the bed?: 4  Moving to and from a bed to a chair (including a wheelchair)?: 3  Need to walk in hospital room?: 4  Climbing 3-5 steps with a railing?: 3  Basic Mobility Total Score: 21       Treatment & Education:  HEP   Therapeutic ex: ankle pumps, QS, SAQ, SLR, HS (3x10)  Gait training with rolling walker   Stair negotiation with railing   0-95 right knee flexion prom    Patient left up in chair with family present..    GOALS:   Multidisciplinary Problems       Physical Therapy Goals       Not on file                    DME Justifications:  No DME recommended requiring DME justifications    Time Tracking:     PT Received On: 06/24/25  PT Start Time: 1010     PT Stop Time: 1030  PT Total Time (min): 20 min     Billable Minutes: Gait Training 8 and Therapeutic Exercise 12    Treatment Type: Treatment  PT/PTA: PT     Number of PTA visits since last PT visit: 0     06/24/2025

## 2025-06-24 NOTE — PLAN OF CARE
Problem: Occupational Therapy  Goal: Occupational Therapy Goal  Description: ST.Pt will perform bathing with Junito with setup at EOB  2.Pt will perform UE dressing with Florencio  3.Pt will perform LE dressing with Junito  4.Pt will transfer bed/chair/bsc with SBA with RW  5.Pt will perform standing task x 2 min with SBA with RW  6.Tolerate 15 min of tx without fatigue.      LTG:   Restore to max I with selfcare and mobility.      Outcome: Progressing

## 2025-06-24 NOTE — ASSESSMENT & PLAN NOTE
Osteoarthritis managed surgically by Total Knee Replacement  Patient admitted to the hospital due to extended nerve block as seen by PT/OT     for DVT ppx.

## 2025-06-24 NOTE — PT/OT/SLP EVAL
Occupational Therapy   Evaluation    Name: Mary Johansen  MRN: [id number]  Admitting Diagnosis: Unilateral primary osteoarthritis, right knee  Recent Surgery: Procedure(s) (LRB):  ROBOTIC ARTHROPLASTY, KNEE, TOTAL (Right) 1 Day Post-Op    Recommendations:     Discharge Recommendations: Low Intensity Therapy  Discharge Equipment Recommendations:  none  Barriers to discharge:  None    Assessment:     Mary Johansen is a 54 y.o. female with a medical diagnosis of Unilateral primary osteoarthritis, right knee.  She presents with s/p right TKR on 6/23/25. Performance deficits affecting function: impaired self care skills, gait instability, impaired functional mobility, impaired balance, pain.      Rehab Prognosis: Good; patient would benefit from acute skilled OT services to address these deficits and reach maximum level of function.       Plan:     Patient to be seen 5 x/week to address the above listed problems via self-care/home management, therapeutic activities, therapeutic exercises  Plan of Care Expires: 07/01/25  Plan of Care Reviewed with: patient    Subjective     Chief Complaint: s/p right TKR  Patient/Family Comments/goals: pt agreeable to OT eval    Occupational Profile:  Living Environment: pt lives with  in one story home with 3 steps to enter  Previous level of function: independent with all ADL tasks, IADL tasks, and functional mobility   Roles and Routines: perform self care; homemaker  Equipment Used at Home: walker, rolling  Assistance upon Discharge: home with home health    Pain/Comfort:  Pain Rating 1: 2/10  Location - Side 1: Right  Location 1: knee  Pain Addressed 1: Pre-medicate for activity    Patients cultural, spiritual, Cheondoism conflicts given the current situation: no    Objective:     Communicated with: IRWIN Carrillo prior to session.  Patient found HOB elevated with knee immobilizer, peripheral IV, SCD upon OT entry to room.    General Precautions: Standard,  fall  Orthopedic Precautions: RLE weight bearing as tolerated  Braces: Knee immobilizer  Respiratory Status: Room air    Occupational Performance:    Bed Mobility:    Patient completed Supine to Sit with minimum assistance    Functional Mobility/Transfers:  Patient completed Sit <> Stand Transfer with contact guard assistance  with  rolling walker   Functional Mobility:  pt performed side step to HOB with CGA with RW    Activities of Daily Living:  Upper Body Dressing: modified independence to ryan dress  Lower Body Dressing: minimum assistance to ryan underwear    Cognitive/Visual Perceptual:  Cognitive/Psychosocial Skills:     -       Oriented to: Person, Place, Time, and Situation   -       Follows Commands/attention:Follows multistep  commands  -       Mood/Affect/Coping skills/emotional control: Cooperative    Physical Exam:  Balance:    -       sitting balance SBA; standing balance CGA  Upper Extremity Range of Motion:     -       Right Upper Extremity: WFL  -       Left Upper Extremity: WFL  Upper Extremity Strength:    -       Right Upper Extremity: WFL  -       Left Upper Extremity: WFL  Gross motor coordination:   WFL    AMPAC 6 Click ADL:  AMPAC Total Score: 23    Treatment & Education:  Pt educated on OT role/POC.   Importance of OOB activity with staff assistance.  Importance of sitting up in the chair throughout the day as tolerated, especially for meals   Safety during functional t/f and mobility with use of RW  Importance of assisting with self-care activities   All questions/concerns answered within OT scope of practice      Patient left sitting edge of bed with all lines intact, call button in reach, and IRWIN Carrillo notified    GOALS:   Multidisciplinary Problems       Occupational Therapy Goals          Problem: Occupational Therapy    Goal Priority Disciplines Outcome Interventions   Occupational Therapy Goal     OT, PT/OT Progressing    Description: ST.Pt will perform bathing with Junito  with setup at EOB  2.Pt will perform UE dressing with Florencio  3.Pt will perform LE dressing with Junito  4.Pt will transfer bed/chair/bsc with SBA with RW  5.Pt will perform standing task x 2 min with SBA with RW  6.Tolerate 15 min of tx without fatigue.      LTG:   Restore to max I with selfcare and mobility.                           DME Justifications:  No DME recommended requiring DME justifications    History:     Past Medical History:   Diagnosis Date    Hypertension          Past Surgical History:   Procedure Laterality Date    ARTHROSCOPIC REPAIR OF ROTATOR CUFF OF SHOULDER Right 7/19/2023    Procedure: REPAIR, ROTATOR CUFF, ARTHROSCOPIC;  Surgeon: Jg Driscoll MD;  Location: [facility] OR;  Service: Orthopedics;  Laterality: Right;    ARTHROSCOPY OF SHOULDER WITH DECOMPRESSION OF SUBACROMIAL SPACE Right 7/19/2023    Procedure: ARTHROSCOPY, SHOULDER, WITH SUBACROMIAL SPACE DECOMPRESSION;  Surgeon: Jg Driscoll MD;  Location: [facility] OR;  Service: Orthopedics;  Laterality: Right;    ARTHROSCOPY OF SHOULDER WITH REMOVAL OF DISTAL CLAVICLE Right 7/19/2023    Procedure: ARTHROSCOPY, SHOULDER, WITH DISTAL CLAVICLE EXCISION;  Surgeon: Jg Driscoll MD;  Location: [facility] OR;  Service: Orthopedics;  Laterality: Right;  POSSIBLE REGENETEN PATCH AUGMENTATION    ARTHROSCOPY,SHOULDER,WITH BICEPS TENODESIS Right 7/19/2023    Procedure: ARTHROSCOPY,SHOULDER,WITH BICEPS TENODESIS;  Surgeon: Jg Driscoll MD;  Location: [facility] OR;  Service: Orthopedics;  Laterality: Right;    HYSTERECTOMY  2005    ROBOTIC ARTHROPLASTY, KNEE Right 6/23/2025    Procedure: ROBOTIC ARTHROPLASTY, KNEE, TOTAL;  Surgeon: Jg Driscoll MD;  Location: [facility] OR;  Service: Orthopedics;  Laterality: Right;    TONSILLECTOMY         Time Tracking:     OT Date of Treatment: 06/24/25  OT Start Time: 0928  OT Stop Time: 0940  OT Total Time (min): 12 min    Billable Minutes:Evaluation OT min complexity eval    6/24/2025

## 2025-06-24 NOTE — ANESTHESIA POSTPROCEDURE EVALUATION
Anesthesia Post Evaluation    Patient: Mary Johansen    Procedure(s) Performed: Procedure(s) (LRB):  ROBOTIC ARTHROPLASTY, KNEE, TOTAL (Right)    Final Anesthesia Type: general      Patient location during evaluation: PACU  Patient participation: Yes- Able to Participate  Level of consciousness: awake and sedated  Post-procedure vital signs: reviewed and stable  Pain management: adequate  Airway patency: patent    PONV status at discharge: No PONV  Anesthetic complications: no      Cardiovascular status: blood pressure returned to baseline  Respiratory status: unassisted  Hydration status: euvolemic  Follow-up not needed.              Vitals Value Taken Time   /73 06/23/25 20:16   Temp 36.4 °C (97.5 °F) 06/23/25 15:52   Pulse 66 06/23/25 20:29   Resp 15 06/23/25 16:20   SpO2 99 % 06/23/25 20:29   Vitals shown include unfiled device data.      Event Time   Out of Recovery 16:20:00         Pain/Candelario Score: Candelario Score: 8 (6/23/2025  4:25 PM)

## 2025-06-24 NOTE — DISCHARGE INSTRUCTIONS
*Keep dressing dry and intact, do not remove dressing, if dressing becomes wet or bloody notify home health staff.  Swingbed/Home Health will change your dressing post of day #3 and day #10, give them that special dressing we sent home with you. If patient has a KENAN system leave on for 7 days then change dressing.  *Continue incentive spirometry at least every 2 hours while awake.  *Continue white stockings remove 2 times a day for 1 hour and replace. Once dressing is changed they will apply the other stocking to surgery leg.  *Elevate surgery leg at ankle on pillow, no pillow under knees.  *Take laxative of choice to have a bowel movement at least by tomorrow and then every other day.  *Increase fluids by mouth.  *Dr. Jg Driscoll total knees should wear knee immobilizer at night and remove during the day.  *Staples will be removed at follow up appointment  *Notify swingbed/home health staff if any concerns.   Hospitalist Discharge orders  *Notify your healthcare provider if you experience any of the following: temperature >100.4  *Notify your healthcare provider if you experience any of the following: redness, tenderness, or any signs or symptoms of infection (pain, swelling, redness, odor or green/yellow drainage around incision site).  *Notify your healthcare provider if you experience any of the following: difficulty breathing or increased cough.  *Notify your physician if you experience any persistent nausea, vomiting, diarrhea or headache.  *Notify your physician if you experience any of the following: severe uncontrolled pain, worsening rash, increased confusion, weakness, dizziness, lightheadedness, or visual disturbances.                                                               Postop Total Knee Replacement Exercises Lying Down   About this topic   Exercise is an important part of recovering after a total knee replacement. Exercises can help to lessen pain, swelling, and stiffness. Exercise also helps  to move fluid and increase strength. Your doctor may want you to limit the amount of weight you can put on your leg. Talk to your doctor about how much weight is safe for you.  General   Before starting with a program, ask your doctor if you are healthy enough to do these exercises. Your doctor may have you work with a  or physical therapist to make a safe exercise program to meet your needs.  Strengthening Exercises   Strengthening exercises keep your muscles firm and strong. Start by repeating each exercise 2 to 3 times. Work up to doing each exercise 10 times. Try to do the exercises 2 to 3 times each day. Do all exercises slowly.  Ankle pumps lying down ? Move each foot up and down like you are pressing down and lifting up on a gas pedal.  Butt squeezes ? Lie on your back and squeeze your buttocks together.  Quad sets ? Lie on your back with your sore leg straight. Tighten your front thigh muscle on your sore leg. Push the back of your knee into the bed or floor. If you are having trouble getting the knee straight, you may want to lie with a towel roll under the ankle for whatever timeframe you can. From time to time, do quad sets to straighten the knee more.  Lower leg lifts with towel ? Lie on your back with your knee slightly bent and foot flat on the bed. Roll up a large towel and put it under your sore knee. Tighten your front thigh muscles and lift the lower leg off the bed until it as straight as possible.  Heel slides ? Lie on your back with both legs straight. Start to bend one leg while sliding your heel on the bed. Once you have gotten your leg to bend as much as you can, slide your foot back down to straighten your leg. Repeat with the other leg. You may want to put a plastic grocery bag under your heel so your leg can slide better.  Straight leg raises ? Lie on your back with one leg straight. Bend your other knee so the foot is flat on the bed. Keeping your leg straight, lift the leg up to  the level of your other knee. Lower it back down. Repeat with the other leg. You may need help with this exercise until you are strong enough to do it on your own. If so, have a helper give support under your ankle with one hand.               What will the results be?   Less pain and swelling  Better range of motion  More strength  Faster recovery  Easier to walk and do other activities  Helpful tips   Stay active and work out to keep your muscles strong and flexible.  Keep a healthy weight so there is not extra stress on your joints. Eat a healthy diet to keep your muscles healthy.  Be sure you do not hold your breath when exercising. This can raise your blood pressure. If you tend to hold your breath, try counting out loud when exercising. If any exercise bothers you, stop right away.  Try walking at an easy pace for a few minutes to warm up your muscles. Do this again after exercising.  After exercising, it is a good idea to use ice. Place an ice pack or a bag of frozen peas wrapped in a towel over the painful part. Never put ice right on the skin. Do not leave the ice on more than 10 to 15 minutes at a time. Ice after activity may help decrease pain and swelling. Never ice before stretching.  Doing exercises before a meal may be a good way to get into a routine.  Exercise may be slightly uncomfortable, but you should not have sharp pains. If you do get sharp pains, stop what you are doing. If the sharp pains continue, call your doctor.  Patient Education        Postop Total Knee Replacement Exercises Seated or Standing   About this topic   Exercise is an important part of recovering after a total knee replacement. Exercises can help to lessen pain, swelling, and stiffness. Exercise also helps to move fluid and increase strength. Your doctor may want you to limit the amount of weight you can put on your leg. Talk to your doctor about how much weight is safe for you.  General   Before starting with a program, ask  your doctor if you are healthy enough to do these exercises. Your doctor may have you work with a  or physical therapist to make a safe exercise program to meet your needs.  Stretching Exercises   Stretching exercises keep your muscles flexible. They also stop them from getting tight. Start by doing each of these stretches 2 to 3 times. In order for your body to make changes, you will need to hold these stretches for 20 to 30 seconds. Try to do the stretches 2 to 3 times each day. Do all exercises slowly.  Chair scoots ? Pull your operated leg back as far as you are able and hold. Then, pump ankle up and down 10 times. Pull your knee back more and hold. Pump ankle up and down 10 times. Now, scoot your hip out to stretch the knee more and hold. Straighten your leg back out and repeat.  Repeated sitting and standing ? Scoot to the edge of the chair. Lean forward to get your nose over your toes. Push off with your arms on the chair to stand up. Once you are standing all the way up, reach back for the chair. Lower yourself down while you bend forward at the waist. Repeat.  Stair stretches ? Put your operated leg up on the first or second step. Gently, lean forward to stretch the knee. Hold and repeat.   Strengthening Exercises   Strengthening exercises keep your muscles firm and strong. Stand or sit up tall on a chair without arms for your exercises. Start by repeating each exercise 2 to 3 times. Work up to doing each exercise 10 times. Try to do the exercises 2 to 3 times each day. Do all exercises slowly.  Knee straightening seated ? Sit in a chair with your knees bent and feet on the floor. Lift the foot on your operated leg up until your knee is straight and hold. Lower your foot back to the floor.  Mini-squats ? Stand up straight in front of a counter and hold on with your hands. Have your feet spread about a foot apart. Bend your knees while keeping your back straight. Return to straight standing position.  At first, start by just having a slight bend at the knee. To make it harder, bend your knees deeper or hold the position longer. Do not go any lower than 90 degrees. Later, you can also do this exercise standing only on your operated leg.  Backward knee bends ? Hold onto a counter. Bend the knee back by moving only the lower leg. Stand up tall and do not bend at the waist. Keep your thighs lined up and do not let your thigh come forward. Repeat on the other leg.               What will the results be?   Less pain and swelling  Better range of motion  More strength  Less stiffness  Faster recovery  Easier to walk and do other activities  Helpful tips   Stay active and work out to keep your muscles strong and flexible.  Keep a healthy weight so there is not extra stress on your joints. Eat a healthy diet to keep your muscles healthy.  Be sure you do not hold your breath when exercising. This can raise your blood pressure. If you tend to hold your breath, try counting out loud when exercising. If any exercise bothers you, stop right away.  Always warm up before stretching. Heated muscles stretch much easier than cool muscles. Stretching cool muscles can lead to injury.  Try walking an easy pace for a few minutes to warm up your muscles. Do this again after exercising.  Never bounce when doing stretches.  After exercising, it is a good idea to use ice. Place an ice pack or a bag of frozen peas wrapped in a towel over the painful part. Never put ice right on the skin. Do not leave the ice on more than 10 to 15 minutes at a time. Ice after activity may help decrease pain and swelling. Never ice before stretching.  Doing exercises before a meal may be a good way to get into a routine.  Exercise may be slightly uncomfortable, but you should not have sharp pains. If you do get sharp pains, stop what you are doing. If the sharp pains continue, call your doctor.

## 2025-06-24 NOTE — ASSESSMENT & PLAN NOTE
Patient received nerve block for right total knee replacement  After procedure patient has experienced prolonged nerve block resulting in inability to hold urine.  Nerve block in diminishing stated by slight return of sensation by patient  Will continue to monitor.

## 2025-06-24 NOTE — HPI
Pt is a 54 y.o. female POD #0 for right Total Knee Arthroplasty  by Dr. gJ Driscoll. At time of exam, pt is complaining of slowly returning sensation resulting in increasing pain. Pt  has a past medical history of Hypertension.  Patient was seen and examined after surgery. Patient tolerated procedure well with no immediate post-operative complications. Chart reviewed. Med Rec was completed at bedside. Medicine consulted for management of chronic conditions.   Pre-operative labs ordered and pending. Patient is afebrile with vital signs /82, HR 68. Pain is controlled and is being managed by primary team. PT/OT evaluation unable to assess due to prolonged nerve block, discharge plan after PT/OT evaluation. Current outpatient medications will be restarted as tolerated and are listed below:    Current Outpatient Medications   Medication Instructions    aspirin (ECOTRIN) 81 mg, Oral, 2 times daily    cefUROXime (CEFTIN) 250 mg, Oral, Every 12 hours    celecoxib (CELEBREX) 200 mg, Oral, 2 times daily    diazePAM (VALIUM) 10 MG Tab Take 1 hour prior to procedure    lisinopriL-hydrochlorothiazide (PRINZIDE,ZESTORETIC) 10-12.5 mg per tablet 1 tablet, Oral, Daily    ondansetron (ZOFRAN-ODT) 4 MG TbDL Take 2 tablets (8 mg total) by mouth every 8 (eight) hours as needed for nausea... May cause drowsiness    oxyCODONE-acetaminophen (PERCOCET)  mg per tablet Take 1 tablet by mouth every 6 (six) hours as needed for pain... May cause drowsiness    predniSONE (DELTASONE) 5 MG tablet Take 1 tablet (5 mg total) by mouth once daily for 21 days    senna-docusate (PERICOLACE) 8.6-50 mg per tablet 1 tablet, Oral, 2 times daily    traZODone (DESYREL) 50 MG tablet 1 tablet, Oral, Nightly    zolpidem (AMBIEN) 5 mg, Oral, Nightly PRN       This consult was performed under the direct supervision of Dr. Alex Reyes MD. Thank you for allowing the [facility] to be involved in the care of this patient.

## 2025-06-24 NOTE — NURSING
Discharge instructions reviewed with patient and spouse; and copy given to patient. Patient and spouse voiced understanding regarding:meds, appt., signs and symptoms to report to physician. Verified with patient ,SPOUSE,and Duy Norris RN that patient had all discharge equipment and discharge meds needed for discharge.  *Keep dressing dry and intact, do not remove dressing, if dressing becomes wet or bloody notify home health staff.  Swingbed/Home Health will change your dressing post of day #3 and day #10, give them that special dressing we sent home with you. If patient has a KENAN system leave on for 7 days then change dressing.  *Continue incentive spirometry at least every 2 hours while awake.  *Continue white stockings remove 2 times a day for 1 hour and replace. Once dressing is changed they will apply the other stocking to surgery leg.  *Elevate surgery leg at ankle on pillow, no pillow under knees.  *Take laxative of choice to have a bowel movement at least by tomorrow and then every other day.  *Increase fluids by mouth.  *Dr. Jg Driscoll total knees should wear knee immobilizer at night and remove during the day.  *Staples will be removed at follow up appointment  *Notify swingbed/home health staff if any concerns.  Hospitalist Discharge orders  *Notify your healthcare provider if you experience any of the following: temperature >100.4  *Notify your healthcare provider if you experience any of the following: redness, tenderness, or any signs or symptoms of infection (pain, swelling, redness, odor or green/yellow drainage around incision site).  *Notify your healthcare provider if you experience any of the following: difficulty breathing or increased cough.  *Notify your physician if you experience any persistent nausea, vomiting, diarrhea or headache.  *Notify your physician if you experience any of the following: severe uncontrolled pain, worsening rash, increased confusion, weakness, dizziness,  lightheadedness, or visual disturbances.

## 2025-06-24 NOTE — ASSESSMENT & PLAN NOTE
Osteoarthritis managed surgically by Total Knee Replacement  Patient admitted to the hospital due to extended nerve block as seen by PT/OT

## 2025-06-24 NOTE — SUBJECTIVE & OBJECTIVE
Past Medical History:   Diagnosis Date    Hypertension        Past Surgical History:   Procedure Laterality Date    ARTHROSCOPIC REPAIR OF ROTATOR CUFF OF SHOULDER Right 7/19/2023    Procedure: REPAIR, ROTATOR CUFF, ARTHROSCOPIC;  Surgeon: Jg Driscoll MD;  Location: [facility];  Service: Orthopedics;  Laterality: Right;    ARTHROSCOPY OF SHOULDER WITH DECOMPRESSION OF SUBACROMIAL SPACE Right 7/19/2023    Procedure: ARTHROSCOPY, SHOULDER, WITH SUBACROMIAL SPACE DECOMPRESSION;  Surgeon: Jg Driscoll MD;  Location: [facility] ER OR;  Service: Orthopedics;  Laterality: Right;    ARTHROSCOPY OF SHOULDER WITH REMOVAL OF DISTAL CLAVICLE Right 7/19/2023    Procedure: ARTHROSCOPY, SHOULDER, WITH DISTAL CLAVICLE EXCISION;  Surgeon: Jg Driscoll MD;  Location: [facility] ER OR;  Service: Orthopedics;  Laterality: Right;  POSSIBLE REGENETEN PATCH AUGMENTATION    ARTHROSCOPY,SHOULDER,WITH BICEPS TENODESIS Right 7/19/2023    Procedure: ARTHROSCOPY,SHOULDER,WITH BICEPS TENODESIS;  Surgeon: Jg Driscoll MD;  Location: [facility];  Service: Orthopedics;  Laterality: Right;    HYSTERECTOMY  2005    TONSILLECTOMY         Review of patient's allergies indicates:   Allergen Reactions    Sulfa (sulfonamide antibiotics) Rash    Pcn [penicillins] Rash       No current facility-administered medications on file prior to encounter.     Current Outpatient Medications on File Prior to Encounter   Medication Sig    cefUROXime (CEFTIN) 250 MG tablet Take 1 tablet (250 mg total) by mouth every 12 (twelve) hours.    diazePAM (VALIUM) 10 MG Tab Take 1 hour prior to procedure    lisinopriL-hydrochlorothiazide (PRINZIDE,ZESTORETIC) 10-12.5 mg per tablet Take 1 tablet by mouth once daily.    traZODone (DESYREL) 50 MG tablet Take 1 tablet by mouth every evening.    zolpidem (AMBIEN) 5 MG Tab Take 1 tablet (5 mg total) by mouth nightly as needed.    [DISCONTINUED] HYDROcodone-acetaminophen (NORCO)  mg per tablet Take 1 tablet  by mouth every 6 (six) hours as needed for Pain.    [DISCONTINUED] naproxen (NAPROSYN) 500 MG tablet Take 1 tablet (500 mg total) by mouth 2 (two) times daily with meals.    [DISCONTINUED] ondansetron (ZOFRAN-ODT) 4 MG TbDL Take 1 tablet (4 mg total) by mouth every 8 (eight) hours as needed (Nausea).     Family History       Problem Relation (Age of Onset)    Arthritis Mother    Hypertension Mother, Father    Kidney disease Father          Tobacco Use    Smoking status: Never    Smokeless tobacco: Never   Substance and Sexual Activity    Alcohol use: Not Currently    Drug use: Never    Sexual activity: Not on file     Review of Systems   Constitutional:  Negative for chills and unexpected weight change.   HENT:  Negative for congestion, drooling, ear pain and sore throat.    Eyes:  Negative for visual disturbance.   Respiratory:  Negative for cough and shortness of breath.    Cardiovascular:  Negative for chest pain, palpitations and leg swelling.   Gastrointestinal:  Negative for abdominal pain, nausea and vomiting.   Genitourinary:  Positive for frequency. Negative for difficulty urinating, dysuria and urgency.   Musculoskeletal:  Positive for arthralgias (from TKR) and myalgias. Negative for back pain.   Skin:  Positive for wound (from TKR). Negative for rash.   Neurological:  Positive for weakness. Negative for dizziness, syncope and headaches.   Psychiatric/Behavioral:  Negative for sleep disturbance.      Objective:     Vital Signs (Most Recent):  Temp: 97.6 °F (36.4 °C) (06/23/25 2053)  Pulse: 68 (06/23/25 2053)  Resp: 15 (06/23/25 1620)  BP: 121/82 (06/23/25 2053)  SpO2: 97 % (06/23/25 2053) Vital Signs (24h Range):  Temp:  [97.5 °F (36.4 °C)-98 °F (36.7 °C)] 97.6 °F (36.4 °C)  Pulse:  [65-97] 68  Resp:  [15-22] 15  SpO2:  [93 %-100 %] 97 %  BP: (118-174)/(67-91) 121/82     Weight: 103 kg (227 lb)  Body mass index is 31.66 kg/m².     Physical Exam  Constitutional:       Appearance: Normal appearance. She is  "obese.   HENT:      Head: Normocephalic and atraumatic.      Right Ear: External ear normal.      Left Ear: External ear normal.      Nose: Nose normal.      Mouth/Throat:      Mouth: Mucous membranes are moist.      Pharynx: No oropharyngeal exudate or posterior oropharyngeal erythema.   Eyes:      Extraocular Movements: Extraocular movements intact.      Conjunctiva/sclera: Conjunctivae normal.      Pupils: Pupils are equal, round, and reactive to light.   Cardiovascular:      Rate and Rhythm: Normal rate and regular rhythm.      Pulses: Normal pulses.      Heart sounds: Normal heart sounds.   Pulmonary:      Effort: Pulmonary effort is normal.      Breath sounds: Normal breath sounds.   Abdominal:      General: Bowel sounds are normal.      Palpations: Abdomen is soft.   Musculoskeletal:         General: Tenderness present. Normal range of motion.      Cervical back: Normal range of motion and neck supple.   Skin:     General: Skin is warm and dry.      Capillary Refill: Capillary refill takes less than 2 seconds.      Comments: Right Leg wrapped from Total Knee Replacement   Neurological:      General: No focal deficit present.      Mental Status: She is alert and oriented to person, place, and time. Mental status is at baseline.   Psychiatric:         Mood and Affect: Mood normal.         Behavior: Behavior normal.          Significant Labs: All pertinent labs within the past 24 hours have been reviewed.  CBC: No results for input(s): "WBC", "HGB", "HCT", "PLT" in the last 48 hours.  CMP: No results for input(s): "NA", "K", "CL", "CO2", "GLU", "BUN", "CREATININE", "CALCIUM", "PROT", "ALBUMIN", "BILITOT", "ALKPHOS", "AST", "ALT", "ANIONGAP", "EGFRNONAA" in the last 48 hours.    Invalid input(s): "ESTGFAFRICA"    Significant Imaging: I have reviewed all pertinent imaging results/findings within the past 24 hours.  "

## 2025-06-24 NOTE — PROGRESS NOTES
[facility]  [facility] Medicine  Progress Note    Patient Name: Mary Johansen  MRN: [id number]  Patient Class: OP- Hospital Outpatient Surgery   Admission Date: 6/23/2025  Length of Stay: 0 days  Attending Physician: Jg Driscoll MD  Primary Care Provider: Eugenie Segura MD      Subjective     Principal Problem:Unilateral primary osteoarthritis, right knee        HPI:  Pt is a 54 y.o. female POD #0 for right Total Knee Arthroplasty  by Dr. Jg Driscoll. At time of exam, pt is complaining of slowly returning sensation resulting in increasing pain. Pt  has a past medical history of Hypertension.  Patient was seen and examined after surgery. Patient tolerated procedure well with no immediate post-operative complications. Chart reviewed. Med Rec was completed at bedside. Medicine consulted for management of chronic conditions.   Pre-operative labs ordered and pending. Patient is afebrile with vital signs /82, HR 68. Pain is controlled and is being managed by primary team. PT/OT evaluation unable to assess due to prolonged nerve block, discharge plan after PT/OT evaluation. Current outpatient medications will be restarted as tolerated and are listed below:    Current Outpatient Medications   Medication Instructions    aspirin (ECOTRIN) 81 mg, Oral, 2 times daily    cefUROXime (CEFTIN) 250 mg, Oral, Every 12 hours    celecoxib (CELEBREX) 200 mg, Oral, 2 times daily    diazePAM (VALIUM) 10 MG Tab Take 1 hour prior to procedure    lisinopriL-hydrochlorothiazide (PRINZIDE,ZESTORETIC) 10-12.5 mg per tablet 1 tablet, Oral, Daily    ondansetron (ZOFRAN-ODT) 4 MG TbDL Take 2 tablets (8 mg total) by mouth every 8 (eight) hours as needed for nausea... May cause drowsiness    oxyCODONE-acetaminophen (PERCOCET)  mg per tablet Take 1 tablet by mouth every 6 (six) hours as needed for pain... May cause drowsiness    predniSONE (DELTASONE) 5 MG tablet Take 1 tablet (5 mg total) by mouth once daily  for 21 days    senna-docusate (PERICOLACE) 8.6-50 mg per tablet 1 tablet, Oral, 2 times daily    traZODone (DESYREL) 50 MG tablet 1 tablet, Oral, Nightly    zolpidem (AMBIEN) 5 mg, Oral, Nightly PRN       This consult was performed under the direct supervision of Dr. Alex Reyes MD. Thank you for allowing the [facility] to be involved in the care of this patient.      Overview/Hospital Course:  No notes on file    Interval History:     Review of Systems   Constitutional:  Negative for chills and unexpected weight change.   HENT:  Negative for congestion, drooling, ear pain and sore throat.    Eyes:  Negative for visual disturbance.   Respiratory:  Negative for cough and shortness of breath.    Cardiovascular:  Negative for chest pain, palpitations and leg swelling.   Gastrointestinal:  Negative for abdominal pain, nausea and vomiting.   Genitourinary:  Positive for frequency. Negative for difficulty urinating, dysuria and urgency.   Musculoskeletal:  Positive for arthralgias (from TKR) and myalgias. Negative for back pain.   Skin:  Positive for wound (from TKR). Negative for rash.   Neurological:  Positive for weakness. Negative for dizziness, syncope and headaches.   Psychiatric/Behavioral:  Negative for sleep disturbance.      Objective:     Vital Signs (Most Recent):  Temp: 97.5 °F (36.4 °C) (06/24/25 0753)  Pulse: 71 (06/24/25 0753)  Resp: 18 (06/24/25 0753)  BP: 122/79 (06/24/25 0753)  SpO2: 96 % (06/24/25 0753) Vital Signs (24h Range):  Temp:  [97.5 °F (36.4 °C)-98 °F (36.7 °C)] 97.5 °F (36.4 °C)  Pulse:  [63-97] 71  Resp:  [15-22] 18  SpO2:  [93 %-100 %] 96 %  BP: ()/(63-91) 122/79     Weight: 103 kg (227 lb)  Body mass index is 31.66 kg/m².    Intake/Output Summary (Last 24 hours) at 6/24/2025 1152  Last data filed at 6/23/2025 1812  Gross per 24 hour   Intake 370 ml   Output --   Net 370 ml         Physical Exam  Constitutional:       Appearance: Normal appearance. She is obese.   HENT:      Head: Normocephalic  and atraumatic.      Right Ear: External ear normal.      Left Ear: External ear normal.      Nose: Nose normal.      Mouth/Throat:      Mouth: Mucous membranes are moist.      Pharynx: No oropharyngeal exudate or posterior oropharyngeal erythema.   Eyes:      Extraocular Movements: Extraocular movements intact.      Conjunctiva/sclera: Conjunctivae normal.      Pupils: Pupils are equal, round, and reactive to light.   Cardiovascular:      Rate and Rhythm: Normal rate and regular rhythm.      Pulses: Normal pulses.      Heart sounds: Normal heart sounds.   Pulmonary:      Effort: Pulmonary effort is normal.      Breath sounds: Normal breath sounds.   Abdominal:      General: Bowel sounds are normal.      Palpations: Abdomen is soft.   Musculoskeletal:         General: Tenderness present. Normal range of motion.      Cervical back: Normal range of motion and neck supple.   Skin:     General: Skin is warm and dry.      Capillary Refill: Capillary refill takes less than 2 seconds.      Comments: Right Leg wrapped from Total Knee Replacement   Neurological:      General: No focal deficit present.      Mental Status: She is alert and oriented to person, place, and time. Mental status is at baseline.   Psychiatric:         Mood and Affect: Mood normal.         Behavior: Behavior normal.               Significant Labs: All pertinent labs within the past 24 hours have been reviewed.    Significant Imaging: I have reviewed all pertinent imaging results/findings within the past 24 hours.      Assessment & Plan  Unilateral primary osteoarthritis, right knee    Osteoarthritis managed surgically by Total Knee Replacement  Patient admitted to the hospital due to extended nerve block as seen by PT/OT     for DVT ppx.       Weakness    Patient weak due to prolonged nerve block  PT/OT consulted noting lack of sensation, inability to walk and, no control over bladder  PT/OT to reevaluate tomorrow   Status post total right knee  replacement  PT POD #O Right Total Knee Replacement.  Pain control managed by Primary team.  Patient starting to regain sensation in right leg.  PT/OT consulted noting prolonged nerve block, inability to walk on effected side, and loss of urinary sensation   - PT/OT will reevaluate tomorrow     Primary hypertension  Patient's blood pressure range in the last 24 hours was: BP  Min: 97/63  Max: 150/79.The patient's inpatient anti-hypertensive regimen is listed below:  Current Antihypertensives  lisinopriL tablet 10 mg, Daily, Oral    Plan  - BP is controlled, no changes needed to their regimen  - Hold home antihypertensives.  - Reported to only take every other day  - Antihypertensive taken today  - will likely restart after discharge  Urinary incontinence without sensory awareness  Patient received nerve block for right total knee replacement  After procedure patient has experienced prolonged nerve block resulting in inability to hold urine.  Nerve block in diminishing stated by slight return of sensation by patient  Will continue to monitor.    VTE Risk Mitigation (From admission, onward)           Ordered     Place RANDA hose  Until discontinued         06/23/25 2203     Place sequential compression device  Until discontinued         06/23/25 2203     IP VTE LOW RISK PATIENT  Once         06/23/25 2134     Place RANDA hose  Until discontinued         06/23/25 1353     Place sequential compression device  Until discontinued         06/23/25 1353                    Discharge Planning   ARY: 6/24/2025     Code Status: Full Code   Medical Readiness for Discharge Date: 6/23/2025  Discharge Plan A: Home with family, Home Health          SDOH Screening:  The patient was screened for utility difficulties, food insecurity, transport difficulties, housing insecurity, and interpersonal safety and there were no concerns identified this admission.          Please place Justification for DME      Micah Ram MD  Department  of Hospital Medicine   [facility]

## 2025-06-24 NOTE — ASSESSMENT & PLAN NOTE
PT POD #O Right Total Knee Replacement.  Pain control managed by Primary team.  Patient starting to regain sensation in right leg.  PT/OT consulted noting prolonged nerve block, inability to walk on effected side, and loss of urinary sensation   - PT/OT will reevaluate tomorrow

## 2025-06-24 NOTE — CONSULTS
[facility]  [facility] Medicine  Consult Note    Patient Name: Mary Johansen  MRN: [id number]  Admission Date: 6/23/2025  Hospital Length of Stay: 0 days  Attending Physician: Jg Driscoll MD   Primary Care Provider: Eugenie Segura MD           Patient information was obtained from patient and past medical records.     Inpatient consult to Hospitalist  Consult performed by: Jr. John Thomas MD  Consult ordered by: Jg Driscoll MD        Subjective:     Principal Problem: Unilateral primary osteoarthritis, right knee    Chief Complaint: No chief complaint on file.       HPI: Pt is a 54 y.o. female POD #0 for right Total Knee Arthroplasty  by Dr. Jg Driscoll. At time of exam, pt is complaining of slowly returning sensation resulting in increasing pain. Pt  has a past medical history of Hypertension.  Patient was seen and examined after surgery. Patient tolerated procedure well with no immediate post-operative complications. Chart reviewed. Med Rec was completed at bedside. Medicine consulted for management of chronic conditions.   Pre-operative labs ordered and pending. Patient is afebrile with vital signs /82, HR 68. Pain is controlled and is being managed by primary team. PT/OT evaluation unable to assess due to prolonged nerve block, discharge plan after PT/OT evaluation. Current outpatient medications will be restarted as tolerated and are listed below:    Current Outpatient Medications   Medication Instructions    aspirin (ECOTRIN) 81 mg, Oral, 2 times daily    cefUROXime (CEFTIN) 250 mg, Oral, Every 12 hours    celecoxib (CELEBREX) 200 mg, Oral, 2 times daily    diazePAM (VALIUM) 10 MG Tab Take 1 hour prior to procedure    lisinopriL-hydrochlorothiazide (PRINZIDE,ZESTORETIC) 10-12.5 mg per tablet 1 tablet, Oral, Daily    ondansetron (ZOFRAN-ODT) 4 MG TbDL Take 2 tablets (8 mg total) by mouth every 8 (eight) hours as needed for nausea... May cause drowsiness     oxyCODONE-acetaminophen (PERCOCET)  mg per tablet Take 1 tablet by mouth every 6 (six) hours as needed for pain... May cause drowsiness    predniSONE (DELTASONE) 5 MG tablet Take 1 tablet (5 mg total) by mouth once daily for 21 days    senna-docusate (PERICOLACE) 8.6-50 mg per tablet 1 tablet, Oral, 2 times daily    traZODone (DESYREL) 50 MG tablet 1 tablet, Oral, Nightly    zolpidem (AMBIEN) 5 mg, Oral, Nightly PRN       This consult was performed under the direct supervision of Dr. Alex Reyes MD. Thank you for allowing the [facility] to be involved in the care of this patient.      Past Medical History:   Diagnosis Date    Hypertension        Past Surgical History:   Procedure Laterality Date    ARTHROSCOPIC REPAIR OF ROTATOR CUFF OF SHOULDER Right 7/19/2023    Procedure: REPAIR, ROTATOR CUFF, ARTHROSCOPIC;  Surgeon: Jg Driscoll MD;  Location: [facility];  Service: Orthopedics;  Laterality: Right;    ARTHROSCOPY OF SHOULDER WITH DECOMPRESSION OF SUBACROMIAL SPACE Right 7/19/2023    Procedure: ARTHROSCOPY, SHOULDER, WITH SUBACROMIAL SPACE DECOMPRESSION;  Surgeon: Jg Driscoll MD;  Location: [facility] OR;  Service: Orthopedics;  Laterality: Right;    ARTHROSCOPY OF SHOULDER WITH REMOVAL OF DISTAL CLAVICLE Right 7/19/2023    Procedure: ARTHROSCOPY, SHOULDER, WITH DISTAL CLAVICLE EXCISION;  Surgeon: Jg Driscoll MD;  Location: [facility] OR;  Service: Orthopedics;  Laterality: Right;  POSSIBLE REGENETEN PATCH AUGMENTATION    ARTHROSCOPY,SHOULDER,WITH BICEPS TENODESIS Right 7/19/2023    Procedure: ARTHROSCOPY,SHOULDER,WITH BICEPS TENODESIS;  Surgeon: Jg Driscoll MD;  Location: [facility];  Service: Orthopedics;  Laterality: Right;    HYSTERECTOMY  2005    TONSILLECTOMY         Review of patient's allergies indicates:   Allergen Reactions    Sulfa (sulfonamide antibiotics) Rash    Pcn [penicillins] Rash       No current facility-administered medications on file prior to encounter.     Current  Outpatient Medications on File Prior to Encounter   Medication Sig    cefUROXime (CEFTIN) 250 MG tablet Take 1 tablet (250 mg total) by mouth every 12 (twelve) hours.    diazePAM (VALIUM) 10 MG Tab Take 1 hour prior to procedure    lisinopriL-hydrochlorothiazide (PRINZIDE,ZESTORETIC) 10-12.5 mg per tablet Take 1 tablet by mouth once daily.    traZODone (DESYREL) 50 MG tablet Take 1 tablet by mouth every evening.    zolpidem (AMBIEN) 5 MG Tab Take 1 tablet (5 mg total) by mouth nightly as needed.    [DISCONTINUED] HYDROcodone-acetaminophen (NORCO)  mg per tablet Take 1 tablet by mouth every 6 (six) hours as needed for Pain.    [DISCONTINUED] naproxen (NAPROSYN) 500 MG tablet Take 1 tablet (500 mg total) by mouth 2 (two) times daily with meals.    [DISCONTINUED] ondansetron (ZOFRAN-ODT) 4 MG TbDL Take 1 tablet (4 mg total) by mouth every 8 (eight) hours as needed (Nausea).     Family History       Problem Relation (Age of Onset)    Arthritis Mother    Hypertension Mother, Father    Kidney disease Father          Tobacco Use    Smoking status: Never    Smokeless tobacco: Never   Substance and Sexual Activity    Alcohol use: Not Currently    Drug use: Never    Sexual activity: Not on file     Review of Systems   Constitutional:  Negative for chills and unexpected weight change.   HENT:  Negative for congestion, drooling, ear pain and sore throat.    Eyes:  Negative for visual disturbance.   Respiratory:  Negative for cough and shortness of breath.    Cardiovascular:  Negative for chest pain, palpitations and leg swelling.   Gastrointestinal:  Negative for abdominal pain, nausea and vomiting.   Genitourinary:  Positive for frequency. Negative for difficulty urinating, dysuria and urgency.   Musculoskeletal:  Positive for arthralgias (from TKR) and myalgias. Negative for back pain.   Skin:  Positive for wound (from TKR). Negative for rash.   Neurological:  Positive for weakness. Negative for dizziness, syncope and  headaches.   Psychiatric/Behavioral:  Negative for sleep disturbance.      Objective:     Vital Signs (Most Recent):  Temp: 97.6 °F (36.4 °C) (06/23/25 2053)  Pulse: 68 (06/23/25 2053)  Resp: 15 (06/23/25 1620)  BP: 121/82 (06/23/25 2053)  SpO2: 97 % (06/23/25 2053) Vital Signs (24h Range):  Temp:  [97.5 °F (36.4 °C)-98 °F (36.7 °C)] 97.6 °F (36.4 °C)  Pulse:  [65-97] 68  Resp:  [15-22] 15  SpO2:  [93 %-100 %] 97 %  BP: (118-174)/(67-91) 121/82     Weight: 103 kg (227 lb)  Body mass index is 31.66 kg/m².     Physical Exam  Constitutional:       Appearance: Normal appearance. She is obese.   HENT:      Head: Normocephalic and atraumatic.      Right Ear: External ear normal.      Left Ear: External ear normal.      Nose: Nose normal.      Mouth/Throat:      Mouth: Mucous membranes are moist.      Pharynx: No oropharyngeal exudate or posterior oropharyngeal erythema.   Eyes:      Extraocular Movements: Extraocular movements intact.      Conjunctiva/sclera: Conjunctivae normal.      Pupils: Pupils are equal, round, and reactive to light.   Cardiovascular:      Rate and Rhythm: Normal rate and regular rhythm.      Pulses: Normal pulses.      Heart sounds: Normal heart sounds.   Pulmonary:      Effort: Pulmonary effort is normal.      Breath sounds: Normal breath sounds.   Abdominal:      General: Bowel sounds are normal.      Palpations: Abdomen is soft.   Musculoskeletal:         General: Tenderness present. Normal range of motion.      Cervical back: Normal range of motion and neck supple.   Skin:     General: Skin is warm and dry.      Capillary Refill: Capillary refill takes less than 2 seconds.      Comments: Right Leg wrapped from Total Knee Replacement   Neurological:      General: No focal deficit present.      Mental Status: She is alert and oriented to person, place, and time. Mental status is at baseline.   Psychiatric:         Mood and Affect: Mood normal.         Behavior: Behavior normal.         "  Significant Labs: All pertinent labs within the past 24 hours have been reviewed.  CBC: No results for input(s): "WBC", "HGB", "HCT", "PLT" in the last 48 hours.  CMP: No results for input(s): "NA", "K", "CL", "CO2", "GLU", "BUN", "CREATININE", "CALCIUM", "PROT", "ALBUMIN", "BILITOT", "ALKPHOS", "AST", "ALT", "ANIONGAP", "EGFRNONAA" in the last 48 hours.    Invalid input(s): "ESTGFAFRICA"    Significant Imaging: I have reviewed all pertinent imaging results/findings within the past 24 hours.  Assessment/Plan:     * Unilateral primary osteoarthritis, right knee    Osteoarthritis managed surgically by Total Knee Replacement  Patient admitted to the hospital due to extended nerve block as seen by PT/OT        Status post total right knee replacement  PT POD #O Right Total Knee Replacement.  Pain control managed by Primary team.  Patient starting to regain sensation in right leg.  PT/OT consulted noting prolonged nerve block, inability to walk on effected side, and loss of urinary sensation   - PT/OT will reevaluate tomorrow       Weakness    Patient weak due to prolonged nerve block  PT/OT consulted noting lack of sensation, inability to walk and, no control over bladder  PT/OT to reevaluate tomorrow     Urinary incontinence without sensory awareness  Patient received nerve block for right total knee replacement  After procedure patient has experienced prolonged nerve block resulting in inability to hold urine.  Nerve block in diminishing stated by slight return of sensation by patient  Will continue to monitor.      Primary hypertension  Patient's blood pressure range in the last 24 hours was: BP  Min: 118/70  Max: 174/91.The patient's inpatient anti-hypertensive regimen is listed below:  Current Antihypertensives       Plan  - BP is controlled, no changes needed to their regimen  - Hold home antihypertensives.  - Reported to only take every other day  - Antihypertensive taken today  - will likely restart after " discharge      VTE Risk Mitigation (From admission, onward)           Ordered     Place RANDA hose  Until discontinued         06/23/25 2203     Place sequential compression device  Until discontinued         06/23/25 2203     IP VTE LOW RISK PATIENT  Once         06/23/25 2134     Place RANDA hose  Until discontinued         06/23/25 1353     Place sequential compression device  Until discontinued         06/23/25 1353                        Thank you for your consult. I will follow-up with patient. Please contact us if you have any additional questions.    John Thomas Jr., MD  Department of Hospital Medicine   [facility]

## 2025-06-24 NOTE — ASSESSMENT & PLAN NOTE
Patient weak due to prolonged nerve block  PT/OT consulted noting lack of sensation, inability to walk and, no control over bladder  PT/OT to reevaluate tomorrow

## 2025-06-24 NOTE — ASSESSMENT & PLAN NOTE
Patient's blood pressure range in the last 24 hours was: BP  Min: 97/63  Max: 150/79.The patient's inpatient anti-hypertensive regimen is listed below:  Current Antihypertensives  lisinopriL tablet 10 mg, Daily, Oral    Plan  - BP is controlled, no changes needed to their regimen  - Hold home antihypertensives.  - Reported to only take every other day  - Antihypertensive taken today  - will likely restart after discharge

## 2025-06-24 NOTE — ASSESSMENT & PLAN NOTE
Patient's blood pressure range in the last 24 hours was: BP  Min: 118/70  Max: 174/91.The patient's inpatient anti-hypertensive regimen is listed below:  Current Antihypertensives       Plan  - BP is controlled, no changes needed to their regimen  - Hold home antihypertensives.  - Reported to only take every other day  - Antihypertensive taken today  - will likely restart after discharge

## 2025-06-26 ENCOUNTER — TELEPHONE (OUTPATIENT)
Dept: ORTHOPEDICS | Facility: HOSPITAL | Age: 55
End: 2025-06-26
Payer: COMMERCIAL

## 2025-06-27 ENCOUNTER — TELEPHONE (OUTPATIENT)
Dept: ORTHOPEDICS | Facility: HOSPITAL | Age: 55
End: 2025-06-27
Payer: COMMERCIAL

## 2025-07-10 ENCOUNTER — CLINICAL SUPPORT (OUTPATIENT)
Dept: REHABILITATION | Facility: HOSPITAL | Age: 55
End: 2025-07-10
Payer: COMMERCIAL

## 2025-07-10 DIAGNOSIS — Z96.651 PRESENCE OF RIGHT ARTIFICIAL KNEE JOINT: Primary | ICD-10-CM

## 2025-07-10 PROCEDURE — 97016 VASOPNEUMATIC DEVICE THERAPY: CPT

## 2025-07-10 PROCEDURE — 97112 NEUROMUSCULAR REEDUCATION: CPT

## 2025-07-10 PROCEDURE — 97110 THERAPEUTIC EXERCISES: CPT

## 2025-07-10 PROCEDURE — 97161 PT EVAL LOW COMPLEX 20 MIN: CPT

## 2025-07-10 NOTE — PROGRESS NOTES
Outpatient Rehab    Physical Therapy Evaluation    Patient Name: Mary Johansen  MRN: [id number]  YOB: 1970  Encounter Date: 7/10/2025    Therapy Diagnosis:   Encounter Diagnosis   Name Primary?    Presence of right artificial knee joint Yes     Physician: Jg Driscoll MD    Physician Orders: Eval and Treat  Medical Diagnosis: Presence of right artificial knee joint  Surgical Diagnosis: s/p R TKA   Surgical Date: 6/23/2025  Days Since Last Surgery: 17    Visit # / Visits Authorized:  1 / 1  Insurance Authorization Period: 7/10/2025 to 7/10/2026  Date of Evaluation: 7/10/2025  Plan of Care Certification: 7/10/2025 to 8/8/2025     Time In: 1258   Time Out: 1356  Total Time (in minutes): 58   Total Billable Time (in minutes): 58    Intake Outcome Measure for FOTO Survey    Therapist reviewed FOTO scores for Mary Johansen on 7/10/2025.   FOTO report - see Media section or FOTO account episode details.     Intake Score: 45%    Precautions:       Subjective   History of Present Illness  Mary is a 54 y.o. female who reports to physical therapy with a chief concern of R knee pain.     The patient reports a medical diagnosis of s/p R TKA. The patient has experienced this issue since 06/23/25. Patient reports a surgery of s/p R TKA. Surgery occurred on 06/23/25. Diagnostic tests related to this condition: X-ray.   X-Ray Details: results unavailable at this time    History of Present Condition/Illness: Pt reports to PT s/p R TKA on 6/23/2035 by Dr. Jg Driscoll. Patient reports that she completed home health PT following surgery and was discharged from their services on Monday of this week. Patient had an MD follow-up on Tuesday of this week. She states that the doctor is concerned about her lack of flexion ROM at this time and it was mentioned at her follow-up visit that she may require a manipulation. Patient reports continued difficulty bending her knee at this time and increased R knee pain  with prolonged sitting and when trying to sleep at night.     Activities of Daily Living  Social history was obtained from Patient.          Patient Responsibilities: Community mobility, Driving, Financial management, Health management, Home management, Laundry, Meal prep, Personal ADL, Shopping, Yard work    Previously independent with activities of daily living? Yes     Currently independent with activities of daily living? No  Activities currently needing assistance include Dressing - lower body.        Previously independent with instrumental activities of daily living? Yes     Currently independent with instrumental activities of daily living? No  Activities currently needing assistance include: Driving, Community mobility, Care of others, Home establishment and management, and Grocery/shopping.            Pain     Patient reports a current pain level of 5/10. Pain at best is reported as 4/10. Pain at worst is reported as 9/10.   Location: R knee  Clinical Progression (since onset): Stable  Pain Qualities: Aching, Throbbing, Tightness  Pain-Relieving Factors: Medications - prescription, Ice, Rest  Pain-Aggravating Factors: Bending, Sleeping, Standing, Sitting         Treatment History  Treatments  Discharged From Past 30 Days: Home health care  Previously Received Treatments: Yes  Previous Treatments: Medications - over-the-counter, Physical therapy  Currently Receiving Treatments: Yes  Current Treatments: Ice, Medications - prescription    Living Arrangements  Living Situation  Housing: Home independently  Living Arrangements: Spouse/significant other  Support Systems: Spouse/significant other, Family members    Home Setup  Type of Structure: House  Home Access: Stairs with rails  Entrance Stairs - Number of Steps: 2  Number of Levels in Home: One level        Employment  Patient reports: Does the patient's condition impact their ability to work?  Employment Status: Employed full-time   Patient cannot sit or  stand for increased periods of time currently due to R knee pain.       Past Medical History/Physical Systems Review:   Mary Johansen  has a past medical history of Hypertension.    Mary Johansen  has a past surgical history that includes Hysterectomy (2005); Tonsillectomy; Arthroscopic repair of rotator cuff of shoulder (Right, 7/19/2023); Arthroscopy of shoulder with decompression of subacromial space (Right, 7/19/2023); arthroscopy,shoulder,with biceps tenodesis (Right, 7/19/2023); Arthroscopy of shoulder with removal of distal clavicle (Right, 7/19/2023); and robotic arthroplasty, knee (Right, 6/23/2025).    Mary has a current medication list which includes the following prescription(s): aspirin, cefuroxime, celecoxib, diazepam, lisinopril-hydrochlorothiazide, ondansetron, oxycodone-acetaminophen, prednisone, senna-docusate, trazodone, and zolpidem.    Review of patient's allergies indicates:   Allergen Reactions    Sulfa (sulfonamide antibiotics) Rash    Pcn [penicillins] Rash        Objective   Knee Observations  Right Knee Observations  Present: Edema, Incision, and Straight Leg Raise Extensor Lag  Right Knee Incision Observations  Present: Clean and Dry  Not Present: Drainage  Left Knee Observations  Not Present: Straight Leg Raise Extensor Lag             Knee Swelling  Location of Measurement Right  (cm) Left  (cm)   20 cm Above Joint Line       10 cm Vastus Medialis Oblique       At Joint Line 52 44   15 cm Below Joint Line       Patient's R ankle at malleoli is 31.5 cm          Knee Range of Motion   Right Knee   Active (deg) Passive (deg) Pain   Flexion 69 74 Yes (active assisted with stretch strap)   Extension 6           Left Knee   Active (deg) Passive (deg) Pain   Flexion 130       Extension 0                          Hip Strength - Planes of Motion   Right Strength Right Pain Left Strength Left  Pain   Flexion (L2) 3   5     Extension 4   5     ABduction 4   5     ADduction 4   5  "    Internal Rotation 4   5     External Rotation 4   5         Knee Strength   Right Strength Right Pain Left Strength Left  Pain   Flexion (S2) 2- Yes 5     Prone Flexion     5     Extension (L3) 2- Yes 5       Knee Extensor Lag  Lag Present: Right  No Lag: Left           Transfers Assessment  Sit to Stand Assistance: Independent  Chair to Bed Assistance: Independent    Bed Mobility Assessment  Sidelying to Sit Assistance: Independent  Sit to Sidelying Assistance: Independent      Sit to Stand Testing    unable without UE use             Ambulation Assistance Required  Surface With  Assistive Device Without Assistive Device Details   Level Independent        Uneven Independent       Curb           Stairs Assistance Required   Assistance Level Upper Extremity Support Pattern   Ascending Independent Two rails Non-reciprocal   Descending Independent Two rails Non-reciprocal        Gait Analysis  Base of Support: Normal  Gait Pattern: Antalgic  Walking Speed: Decreased    Right Side Walking Observations  Decreased: Stance Time and Swing Time  Right Foot Contact Pattern: Flat foot    Left Side Walking Observations  Decreased: Step Length     Knee Observations During Gait  Right: Decreased Knee Flexion in Swing, Decreased Knee Flexion Loading Response, Decreased Knee Extension in Initial Contact, and Knee Decreased Extension in Midstance  Gait Analysis Details  Gait speed: 1.44 feet per second          Treatment:  Therapeutic Exercise  TE 1: nustep x 5 minutes  TE 2: seated heel slides 2 x 10  TE 3: supine heel slides 2 x 10 with stretch strap and ankle pump and EROM  TE 4: supine knee flexion stretch over red bolster  Balance/Neuromuscular Re-Education  NMR 1: Indonesian ESTIM with quad sets for 5 minute  NMR 2: SAQs 2 x 10 3"  NMR 3: SLR with min A from PT 2 x 10  Modalities  Cryotherapy (Minutes\Location): Gameready vasopneumatic device applied to R knee for 10 minutes on moderate pressure with elevation above heart " "level    Time Entry(in minutes):  PT Evaluation (Low) Time Entry: 20  E-Stim (Attended) Time Entry: 5 (with quad sets billed under neuro)  Vasopneumatic Devices Time Entry: 10  Neuromuscular Re-Education Time Entry: 13  Therapeutic Exercise Time Entry: 15    Assessment & Plan   Assessment  Mary presents with a condition of Low complexity.   Presentation of Symptoms: Stable  Will Comorbidities Impact Care: Yes  Chronicity of R knee pain and guarding prior to surgery    Functional Limitations: Activity tolerance, Ambulating on uneven surfaces, Decreased ambulation distance/endurance, Disrupted sleep pattern, Gait limitations, Painful locomotion/ambulation, Participating in leisure activities, Sitting tolerance, Standing tolerance, Squatting, Transfers  Impairments: Abnormal coordination, Abnormal gait, Abnormal or restricted range of motion, Impaired physical strength, Pain with functional activity  Personal Factors Affecting Prognosis: Pain    Patient Goal for Therapy (PT): "I don't want the to have to go back in my knee."  Prognosis: Good  Assessment Details: Patient presents to PT with decreased R knee ROM, R knee pain, R LE edema, impaired R LE muscle strength and motor coordination and tissue tightness secondary to R TKA procedure. Patient's impairments are currently limiting her overall activity tolerance as well as her safety and independence with functional mobility tasks. She can benefit from skilled PT services to improve listed impairments and promote return to higher PLOF.     Plan  From a physical therapy perspective, the patient would benefit from: Skilled Rehab Services    Planned therapy interventions include: Therapeutic exercise, Therapeutic activities, Neuromuscular re-education, Manual therapy, and Gait training.    Planned modalities to include: Cryotherapy (cold pack), Electrical stimulation - passive/unattended, Electrical stimulation - attended, Thermotherapy (hot pack), and Vasopneumatic " pump.        Visit Frequency: 3 times Per Week for 4 Weeks.       This plan was discussed with Patient.   Discussion participants: Agreed Upon Plan of Care  Plan details: Pending patient's progress with PT interventions, frequency of PT may be decreased due to limited number of therapy visits remaining for this calendar year per patient's insurance.           The patient's spiritual, cultural, and educational needs were considered, and the patient is agreeable to the plan of care and goals.           Goals:   Active       Long Term Goals        Pt will increase R knee flexion to 120, knee extension to 0 degrees, and quad lag to 0 degrees to allow patient normal gait pattern.       Start:  07/10/25    Expected End:  08/08/25            Pt will increase R knee strength to 4+/5 to allow patient to safely return to prior level of function        Start:  07/10/25    Expected End:  08/08/25            Pt will ambulate with increased gait speed to greater than or equal to 3.0 feet per second for increased safety with community ambulation.       Start:  07/10/25    Expected End:  08/08/25            Pt will complete 5 x sit <> less than or equal to 15 seconds indicating increased lower extremity power for functional transfers.       Start:  07/10/25    Expected End:  08/08/25            Patient will independently ambulate community distances with no assistive device for return to PLOF.        Start:  07/10/25    Expected End:  08/08/25            Patient will have increased FOTO score to greater than or equal to 57% indicating increased function.       Start:  07/10/25    Expected End:  08/08/25               Short Term Goals        Patient will independently complete home exercise program with correct form.         Start:  07/10/25    Expected End:  08/08/25            Patient will report decreased R knee pain to less than or equal to 4/10 for improved quality of life.        Start:  07/10/25    Expected End:   08/08/25            Pt will increase R knee flexion to 90, knee extension to 0 degrees, and quad lag to 0 degrees to allow patient improved gait pattern.       Start:  07/10/25    Expected End:  08/08/25                      Patient will transfer sit<>stand without UE use for increased independence with functional transfers.        Start:  07/10/25    Expected End:  08/08/25                Alfredo Christine, PT, DPT    Physician Signature : ____________________________________________________  Date:_______________________________________________

## 2025-07-11 ENCOUNTER — CLINICAL SUPPORT (OUTPATIENT)
Dept: REHABILITATION | Facility: HOSPITAL | Age: 55
End: 2025-07-11
Payer: COMMERCIAL

## 2025-07-11 DIAGNOSIS — R52 PAIN: ICD-10-CM

## 2025-07-11 DIAGNOSIS — R60.0 LOCALIZED EDEMA: ICD-10-CM

## 2025-07-11 DIAGNOSIS — R53.1 WEAKNESS: ICD-10-CM

## 2025-07-11 DIAGNOSIS — R26.2 DIFFICULTY WALKING: ICD-10-CM

## 2025-07-11 DIAGNOSIS — Z96.651 PRESENCE OF RIGHT ARTIFICIAL KNEE JOINT: Primary | ICD-10-CM

## 2025-07-11 PROBLEM — M25.561 ACUTE PAIN OF RIGHT KNEE: Status: RESOLVED | Noted: 2025-04-02 | Resolved: 2025-07-11

## 2025-07-11 PROBLEM — M17.11 UNILATERAL PRIMARY OSTEOARTHRITIS, RIGHT KNEE: Status: RESOLVED | Noted: 2025-04-02 | Resolved: 2025-07-11

## 2025-07-11 PROCEDURE — 97016 VASOPNEUMATIC DEVICE THERAPY: CPT | Mod: CQ

## 2025-07-11 PROCEDURE — 97112 NEUROMUSCULAR REEDUCATION: CPT | Mod: CQ

## 2025-07-11 PROCEDURE — 97110 THERAPEUTIC EXERCISES: CPT | Mod: CQ

## 2025-07-11 NOTE — PROGRESS NOTES
Outpatient Rehab    Physical Therapy Visit    Patient Name: Mary Johansen  MRN: [id number]  YOB: 1970  Encounter Date: 7/11/2025    Therapy Diagnosis:   Encounter Diagnoses   Name Primary?    Presence of right artificial knee joint Yes    Weakness     Pain     Difficulty walking     Localized edema      Physician: Jg Driscoll MD    Physician Orders: Eval and Treat  Medical Diagnosis: Presence of right artificial knee joint  Surgical Diagnosis: s/p R TKA   Surgical Date: 6/23/2025  Days Since Last Surgery: 18    Visit # / Visits Authorized:  2 / 12  Insurance Authorization Period: 7/10/2025 to 7/11/2027  Date of Evaluation: 7/10/2025  Plan of Care Certification: 7/10/2025 to 8/8/2025      PT/PTA:     Number of PTA visits since last PT visit: 1/5  Time In: 1020   Time Out: 1110  Total Time (in minutes): 50   Total Billable Time (in minutes): 40    FOTO:  Intake Score:  %  Survey Score 2:  %  Survey Score 3:  %    Received Plan of Care per Cuca Christine, PT        Subjective   Pt reports she is doing much better after initial evaluation yesterday.  Pain reported as 3/10. R knee    Objective       Knee Range of Motion   Right Knee   Active (deg) Passive (deg) Pain   Flexion 75 80     Extension 0                       Treatment:  Therapeutic Exercise  TE 1: nustep x 8 minutes  TE 2: seated heel slides 2 x 10  TE 3: supine heel slides 2 x 10 with stretch strap and ankle pump and EROM  TE 5: wedge 2 min  TE 6: seated HS stretching, 4x20 s/h  Balance/Neuromuscular Re-Education  NMR 1: Turkish ESTIM with quad sets, SAQs, and SLRs w/ strap for 5 minutes each  NMR 4: seated LAQs x 20 reps  Modalities  Cryotherapy (Minutes\Location): Gameready vasopneumatic device applied to R knee for 10 minutes on moderate pressure with elevation above heart level      Time Entry(in minutes):  Vasopneumatic Devices Time Entry: 10  Neuromuscular Re-Education Time Entry: 15  Therapeutic Exercise Time Entry:  15    Assessment & Plan   Assessment: Pt progressing well as evidenced by decrease in edema, improved range of motion as noted, less pain overall   Evaluation/Treatment Tolerance: Patient tolerated treatment well    The patient will continue to benefit from skilled outpatient physical therapy in order to address the deficits listed in the problem list on the initial evaluation, provide patient and family education, and maximize the patients level of independence in the home and community environments.           Plan: Continue per POC and progress as pt able    Goals:   Active       Long Term Goals        Pt will increase R knee flexion to 120, knee extension to 0 degrees, and quad lag to 0 degrees to allow patient normal gait pattern.       Start:  07/10/25    Expected End:  08/08/25            Pt will increase R knee strength to 4+/5 to allow patient to safely return to prior level of function        Start:  07/10/25    Expected End:  08/08/25            Pt will ambulate with increased gait speed to greater than or equal to 3.0 feet per second for increased safety with community ambulation.       Start:  07/10/25    Expected End:  08/08/25            Pt will complete 5 x sit <> less than or equal to 15 seconds indicating increased lower extremity power for functional transfers.       Start:  07/10/25    Expected End:  08/08/25            Patient will independently ambulate community distances with no assistive device for return to PLOF.        Start:  07/10/25    Expected End:  08/08/25            Patient will have increased FOTO score to greater than or equal to 57% indicating increased function.       Start:  07/10/25    Expected End:  08/08/25               Short Term Goals        Patient will independently complete home exercise program with correct form.         Start:  07/10/25    Expected End:  08/08/25            Patient will report decreased R knee pain to less than or equal to 4/10 for improved  quality of life.        Start:  07/10/25    Expected End:  08/08/25            Pt will increase R knee flexion to 90, knee extension to 0 degrees, and quad lag to 0 degrees to allow patient improved gait pattern.       Start:  07/10/25    Expected End:  08/08/25                      Patient will transfer sit<>stand without UE use for increased independence with functional transfers.        Start:  07/10/25    Expected End:  08/08/25                Hillary Bautista, PTA

## 2025-07-14 ENCOUNTER — CLINICAL SUPPORT (OUTPATIENT)
Dept: REHABILITATION | Facility: HOSPITAL | Age: 55
End: 2025-07-14
Payer: COMMERCIAL

## 2025-07-14 DIAGNOSIS — Z96.651 PRESENCE OF RIGHT ARTIFICIAL KNEE JOINT: Primary | ICD-10-CM

## 2025-07-14 PROCEDURE — 97110 THERAPEUTIC EXERCISES: CPT

## 2025-07-14 PROCEDURE — 97112 NEUROMUSCULAR REEDUCATION: CPT

## 2025-07-14 PROCEDURE — 97016 VASOPNEUMATIC DEVICE THERAPY: CPT

## 2025-07-14 NOTE — PROGRESS NOTES
"  Outpatient Rehab    Physical Therapy Visit    Patient Name: Mary Johansen  MRN: [id number]  YOB: 1970  Encounter Date: 7/14/2025    Therapy Diagnosis:   Encounter Diagnosis   Name Primary?    Presence of right artificial knee joint Yes     Physician: Jg Driscoll MD    Physician Orders: Eval and Treat  Medical Diagnosis: Presence of right artificial knee joint  Surgical Diagnosis: s/p R TKA   Surgical Date: 6/23/2025  Days Since Last Surgery: 21    Visit # / Visits Authorized:  2 / 12  Insurance Authorization Period: 7/10/2025 to 7/11/2027  Date of Evaluation: 7/10/2025  Plan of Care Certification: 7/10/2025 to 8/8/2025      PT/PTA: PT   Number of PTA visits since last PT visit:0  Time In: 1113   Time Out: 1155  Total Time (in minutes): 42   Total Billable Time (in minutes): 42    FOTO:  Intake Score: 45%  Survey Score 2: Not applicable for this Episode%  Survey Score 3: Not applicable for this Episode%    Precautions:   R LE weight bearing as tolerated       Subjective   "I have been working on it at home. I feel like it is doing better.".  Pain reported as 3/10. R knee    Objective       Knee Range of Motion   Right Knee   Active (deg) Passive (deg) Pain   Flexion 95       Extension 0                       Treatment:  Therapeutic Exercise  TE 1: nustep x 7minutes  TE 2: seated heel slides 2 x 10  TE 3: supine heel slides 2 x 10 with stretch strap and ankle pump and EROM  TE 4: HS curls 2 x 10 red band  TE 5: wedge 2 min  TE 6: seated HS stretching, 4x20 s/h  Balance/Neuromuscular Re-Education  NMR 1: LAQs 2 x 10 3"  NMR 2: SAQs 2 x 10 3"  NMR 3: SLR with min A from PT 2 x 10  NMR 4: Sidelying hip ABD 2 x 10 min A from PT  NMR 5: Quad sets 2 x 10 3"  Modalities  Cryotherapy (Minutes\Location): Gameready vasopneumatic device applied to R knee for 10 minutes on moderate pressure with elevation above heart level    Time Entry(in minutes):  Vasopneumatic Devices Time Entry: 10  Neuromuscular " Re-Education Time Entry: 15  Therapeutic Exercise Time Entry: 17    Assessment & Plan   Assessment: Patient presents to PT today with noted decreased edema in R LE and improved step through gait pattern when ambulating into clinic. PT provided patient with verbal and occasional visual cues to decrease hip hiking compensation during ROM exercises. PT noted increased R quad activation today with exercises resulting in no [place]ese ESTIM treatment today. Patient with increased R knee active range of motion 0-95 degrees. PT noted continued quad lag with straight leg raises exercises.   Evaluation/Treatment Tolerance: Patient tolerated treatment well    The patient will continue to benefit from skilled outpatient physical therapy in order to address the deficits listed in the problem list on the initial evaluation, provide patient and family education, and maximize the patients level of independence in the home and community environments.     The patient's spiritual, cultural, and educational needs were considered, and the patient is agreeable to the plan of care and goals.           Plan: Continue per POC and progress as pt able    Goals:   Active       Long Term Goals        Pt will increase R knee flexion to 120, knee extension to 0 degrees, and quad lag to 0 degrees to allow patient normal gait pattern.       Start:  07/10/25    Expected End:  08/08/25            Pt will increase R knee strength to 4+/5 to allow patient to safely return to prior level of function        Start:  07/10/25    Expected End:  08/08/25            Pt will ambulate with increased gait speed to greater than or equal to 3.0 feet per second for increased safety with community ambulation.       Start:  07/10/25    Expected End:  08/08/25            Pt will complete 5 x sit <> less than or equal to 15 seconds indicating increased lower extremity power for functional transfers.       Start:  07/10/25    Expected End:  08/08/25             Patient will independently ambulate community distances with no assistive device for return to PLOF.        Start:  07/10/25    Expected End:  08/08/25            Patient will have increased FOTO score to greater than or equal to 57% indicating increased function.       Start:  07/10/25    Expected End:  08/08/25               Short Term Goals        Patient will independently complete home exercise program with correct form.         Start:  07/10/25    Expected End:  08/08/25            Patient will report decreased R knee pain to less than or equal to 4/10 for improved quality of life.        Start:  07/10/25    Expected End:  08/08/25            Pt will increase R knee flexion to 90, knee extension to 0 degrees, and quad lag to 0 degrees to allow patient improved gait pattern.       Start:  07/10/25    Expected End:  08/08/25                      Patient will transfer sit<>stand without UE use for increased independence with functional transfers.        Start:  07/10/25    Expected End:  08/08/25                Alfredo Christine, PT, DPT

## 2025-07-15 ENCOUNTER — CLINICAL SUPPORT (OUTPATIENT)
Dept: REHABILITATION | Facility: HOSPITAL | Age: 55
End: 2025-07-15
Payer: COMMERCIAL

## 2025-07-15 DIAGNOSIS — R26.2 DIFFICULTY WALKING: ICD-10-CM

## 2025-07-15 DIAGNOSIS — R53.1 WEAKNESS: ICD-10-CM

## 2025-07-15 DIAGNOSIS — Z96.651 PRESENCE OF RIGHT ARTIFICIAL KNEE JOINT: Primary | ICD-10-CM

## 2025-07-15 DIAGNOSIS — R52 PAIN: ICD-10-CM

## 2025-07-15 PROCEDURE — 97112 NEUROMUSCULAR REEDUCATION: CPT | Mod: CQ

## 2025-07-15 PROCEDURE — 97010 HOT OR COLD PACKS THERAPY: CPT | Mod: CQ

## 2025-07-15 PROCEDURE — 97110 THERAPEUTIC EXERCISES: CPT | Mod: CQ

## 2025-07-15 NOTE — PROGRESS NOTES
"  Outpatient Rehab    Physical Therapy Visit    Patient Name: Mary Johansen  MRN: [id number]  YOB: 1970  Encounter Date: 7/15/2025    Therapy Diagnosis:   Encounter Diagnoses   Name Primary?    Presence of right artificial knee joint Yes    Weakness     Pain     Difficulty walking      Physician: Jg Driscoll MD    Physician Orders: Eval and Treat  Medical Diagnosis: Presence of right artificial knee joint  Surgical Diagnosis: s/p R TKA   Surgical Date: 6/23/2025  Days Since Last Surgery: 22    Visit # / Visits Authorized:  4 / 12  Insurance Authorization Period: 7/10/2025 to 7/11/2027  Date of Evaluation: 7/10/2025  Plan of Care Certification: 7/10/2025 to 8/8/2025      PT/PTA:     Number of PTA visits since last PT visit: 1/5  Time In: 1315   Time Out: 1405  Total Time (in minutes): 50   Total Billable Time (in minutes): 45    FOTO:  Intake Score: 45%  Survey Score 2: Not applicable for this Episode%  Survey Score 3: Not applicable for this Episode%         Subjective   pain as noted; still w/ edema.  Pain reported as 3/10. R knee    Objective       Knee Range of Motion   Right Knee   Active (deg) Passive (deg) Pain   Flexion 96       Extension 0           15 degrees quad lag w/ long arc quad in sitting            Treatment:  Therapeutic Exercise  TE 1: nustep x 8 minutes  TE 2: seated heel slides 2 x 10  TE 4: supine HS rolling w/ ball x 20 reps  TE 5: wedge 2 min  TE 6: seated HS stretching, 4x20 s/h  Balance/Neuromuscular Re-Education  NMR 1: LAQs 2 x 10 3"  NMR 2: SAQs 2 x 10 3"  NMR 3: SLR with strap assist x 20 reps  NMR 5: Quad sets 2 x 10 3"  Modalities  Cryotherapy (Minutes\Location): cold pack to R knee x 5 min in supine w/ RLE elevated above heart      Time Entry(in minutes):  Hot/Cold Pack Time Entry: 5  Neuromuscular Re-Education Time Entry: 20  Therapeutic Exercise Time Entry: 25    Assessment & Plan   Assessment: Continues to be limited by pain, edema; slowly improving range of " motion and strength but w/ significant quad lag today  Evaluation/Treatment Tolerance: Patient tolerated treatment well    The patient will continue to benefit from skilled outpatient physical therapy in order to address the deficits listed in the problem list on the initial evaluation, provide patient and family education, and maximize the patients level of independence in the home and community environments.         Plan: Continue per POC and progress as pt able    Goals:   Active       Long Term Goals        Pt will increase R knee flexion to 120, knee extension to 0 degrees, and quad lag to 0 degrees to allow patient normal gait pattern.       Start:  07/10/25    Expected End:  08/08/25            Pt will increase R knee strength to 4+/5 to allow patient to safely return to prior level of function        Start:  07/10/25    Expected End:  08/08/25            Pt will ambulate with increased gait speed to greater than or equal to 3.0 feet per second for increased safety with community ambulation.       Start:  07/10/25    Expected End:  08/08/25            Pt will complete 5 x sit <> less than or equal to 15 seconds indicating increased lower extremity power for functional transfers.       Start:  07/10/25    Expected End:  08/08/25            Patient will independently ambulate community distances with no assistive device for return to PLOF.        Start:  07/10/25    Expected End:  08/08/25            Patient will have increased FOTO score to greater than or equal to 57% indicating increased function.       Start:  07/10/25    Expected End:  08/08/25               Short Term Goals        Patient will independently complete home exercise program with correct form.         Start:  07/10/25    Expected End:  08/08/25            Patient will report decreased R knee pain to less than or equal to 4/10 for improved quality of life.        Start:  07/10/25    Expected End:  08/08/25            Pt will increase  R knee flexion to 90, knee extension to 0 degrees, and quad lag to 0 degrees to allow patient improved gait pattern.       Start:  07/10/25    Expected End:  08/08/25                      Patient will transfer sit<>stand without UE use for increased independence with functional transfers.        Start:  07/10/25    Expected End:  08/08/25                Hillary Bautista, PTA

## 2025-07-17 ENCOUNTER — CLINICAL SUPPORT (OUTPATIENT)
Dept: REHABILITATION | Facility: HOSPITAL | Age: 55
End: 2025-07-17
Payer: COMMERCIAL

## 2025-07-17 DIAGNOSIS — R26.2 DIFFICULTY WALKING: ICD-10-CM

## 2025-07-17 DIAGNOSIS — Z96.651 PRESENCE OF RIGHT ARTIFICIAL KNEE JOINT: ICD-10-CM

## 2025-07-17 DIAGNOSIS — Z96.651 AFTERCARE FOLLOWING RIGHT KNEE JOINT REPLACEMENT SURGERY: Primary | ICD-10-CM

## 2025-07-17 DIAGNOSIS — R53.1 WEAKNESS: ICD-10-CM

## 2025-07-17 DIAGNOSIS — Z47.1 AFTERCARE FOLLOWING RIGHT KNEE JOINT REPLACEMENT SURGERY: Primary | ICD-10-CM

## 2025-07-17 PROCEDURE — 97112 NEUROMUSCULAR REEDUCATION: CPT

## 2025-07-17 PROCEDURE — 97110 THERAPEUTIC EXERCISES: CPT

## 2025-07-17 NOTE — PROGRESS NOTES
Outpatient Rehab    Physical Therapy Visit    Patient Name: Mary Johansen  MRN: [id number]  YOB: 1970  Encounter Date: 7/17/2025    Therapy Diagnosis:   Encounter Diagnoses   Name Primary?    Presence of right artificial knee joint     Aftercare following right knee joint replacement surgery Yes    Weakness     Difficulty walking      Physician: Jg Driscoll MD    Physician Orders: Eval and Treat  Medical Diagnosis: Presence of right artificial knee joint  Surgical Diagnosis: s/p R TKA   Surgical Date: 6/23/2025  Days Since Last Surgery: 24    Visit # / Visits Authorized:  5 / 12  Insurance Authorization Period: 7/10/2025 to 7/11/2027  Date of Evaluation: 7/10/2025  Plan of Care Certification: 7/10/2025 to 8/8/2025      PT/PTA: PT   Number of PTA visits since last PT visit:0  Time In: 1318   Time Out: 1359  Total Time (in minutes): 41   Total Billable Time (in minutes): 41    FOTO:  Intake Score: 45%  Survey Score 2: Not applicable for this Episode%  Survey Score 3: Not applicable for this Episode%    Precautions:         Subjective   Pt states that she is just have a bad today feeling sluggish and tight/sore in the knee..         Objective       Knee Range of Motion   Right Knee   Active (deg) Passive (deg) Pain   Flexion 85 (seated)       Extension 0           Could not perform supine SLR 7/17/2025            Treatment:  Therapeutic Exercise  TE 1: nustep x 8 minutes  TE 2: gastroc stretch: 2 min  TE 3: seated HS stretch, RLE: 3 x 20 sec hold  TE 4: seated heel slides 2 x 10  TE 5: supine heel slides, with strap: 3 min, 5 ankle pumps each  TE 6: wound assessment/steri strip placement  Balance/Neuromuscular Re-Education  NMR 1: quad sets: 20 x 3 sec hold  NMR 2: SAQs 2 x 10 3 sec hold  NMR 3: SLR with strap assist x 20 reps    Time Entry(in minutes):  Neuromuscular Re-Education Time Entry: 15  Therapeutic Exercise Time Entry: 26    Assessment & Plan   Assessment: Continued with  exercises/activities from previous treatment session with fair to good tolerance. Added in some new knee flexibility activities today. Placed 4 steristrips on R surgical incision due to incomplete closure of a few areas on wound. Pt declined ice post session stating that she would ice the knee at home. Will continue to progress as able and as tolerated in coming visits.        The patient will continue to benefit from skilled outpatient physical therapy in order to address the deficits listed in the problem list on the initial evaluation, provide patient and family education, and maximize the patients level of independence in the home and community environments.     The patient's spiritual, cultural, and educational needs were considered, and the patient is agreeable to the plan of care and goals.           Plan: Continue per POC and progress as pt able    Goals:   Active       Long Term Goals        Pt will increase R knee flexion to 120, knee extension to 0 degrees, and quad lag to 0 degrees to allow patient normal gait pattern.       Start:  07/10/25    Expected End:  08/08/25            Pt will increase R knee strength to 4+/5 to allow patient to safely return to prior level of function        Start:  07/10/25    Expected End:  08/08/25            Pt will ambulate with increased gait speed to greater than or equal to 3.0 feet per second for increased safety with community ambulation.       Start:  07/10/25    Expected End:  08/08/25            Pt will complete 5 x sit <> less than or equal to 15 seconds indicating increased lower extremity power for functional transfers.       Start:  07/10/25    Expected End:  08/08/25            Patient will independently ambulate community distances with no assistive device for return to PLOF.        Start:  07/10/25    Expected End:  08/08/25            Patient will have increased FOTO score to greater than or equal to 57% indicating increased function.       Start:   07/10/25    Expected End:  08/08/25               Short Term Goals        Patient will independently complete home exercise program with correct form.         Start:  07/10/25    Expected End:  08/08/25            Patient will report decreased R knee pain to less than or equal to 4/10 for improved quality of life.        Start:  07/10/25    Expected End:  08/08/25            Pt will increase R knee flexion to 90, knee extension to 0 degrees, and quad lag to 0 degrees to allow patient improved gait pattern.       Start:  07/10/25    Expected End:  08/08/25                      Patient will transfer sit<>stand without UE use for increased independence with functional transfers.        Start:  07/10/25    Expected End:  08/08/25                Hossein Rahman, PT, DPT  7/17/2025

## 2025-07-21 ENCOUNTER — CLINICAL SUPPORT (OUTPATIENT)
Dept: REHABILITATION | Facility: HOSPITAL | Age: 55
End: 2025-07-21
Payer: COMMERCIAL

## 2025-07-21 DIAGNOSIS — Z96.651 AFTERCARE FOLLOWING RIGHT KNEE JOINT REPLACEMENT SURGERY: ICD-10-CM

## 2025-07-21 DIAGNOSIS — Z47.1 AFTERCARE FOLLOWING RIGHT KNEE JOINT REPLACEMENT SURGERY: ICD-10-CM

## 2025-07-21 DIAGNOSIS — Z96.651 PRESENCE OF RIGHT ARTIFICIAL KNEE JOINT: Primary | ICD-10-CM

## 2025-07-21 DIAGNOSIS — R26.2 DIFFICULTY WALKING: ICD-10-CM

## 2025-07-21 DIAGNOSIS — R53.1 WEAKNESS: ICD-10-CM

## 2025-07-21 PROCEDURE — 97110 THERAPEUTIC EXERCISES: CPT

## 2025-07-21 PROCEDURE — 97112 NEUROMUSCULAR REEDUCATION: CPT

## 2025-07-21 PROCEDURE — 97530 THERAPEUTIC ACTIVITIES: CPT

## 2025-07-21 PROCEDURE — 97010 HOT OR COLD PACKS THERAPY: CPT

## 2025-07-21 NOTE — PROGRESS NOTES
"  Outpatient Rehab    Physical Therapy Visit    Patient Name: Mary Johansen  MRN: [id number]  YOB: 1970  Encounter Date: 7/21/2025    Therapy Diagnosis:   Encounter Diagnoses   Name Primary?    Presence of right artificial knee joint Yes    Aftercare following right knee joint replacement surgery     Weakness     Difficulty walking      Physician: Jg Driscoll MD    Physician Orders: Eval and Treat  Medical Diagnosis: Presence of right artificial knee joint  Surgical Diagnosis: s/p R TKA   Surgical Date: 6/23/2025  Days Since Last Surgery: 28    Visit # / Visits Authorized:  5 / 12  Insurance Authorization Period: 7/10/2025 to 7/11/2027  Date of Evaluation: 7/10/2025  Plan of Care Certification: 7/10/2025 to 8/8/2025      PT/PTA: PT   Number of PTA visits since last PT visit:0  Time In: 1018   Time Out: 1103  Total Time (in minutes): 45   Total Billable Time (in minutes): 39    FOTO:  Intake Score (%): 45  Survey Score 2 (%): Not applicable for this Episode  Survey Score 3 (%): Not applicable for this Episode    Precautions:   R LE weight bearing as tolerated       Subjective   "I may have overdone it some yesterday.".  Pain reported as 4/10. R knee    Objective       Knee Range of Motion   Right Knee   Active (deg) Passive (deg) Pain   Flexion 93       Extension 5                     Treatment:  Therapeutic Exercise  TE 1: nustep x 8 minutes  TE 2: wedge: 2 min  TE 3: seated HS stretch, RLE: 3 x 20 sec hold  TE 4: seated heel slides 2 x 10  TE 5: supine heel slides 2 x 10  TE 6: HS curls 2 x 10 green band  Balance/Neuromuscular Re-Education  NMR 1: quad sets: 20 x 3 sec hold  NMR 2: SAQs 3 x 10 3 sec hold  NMR 3: LAQs 2 x 10 3"  NMR 4: SLR 2 x 10  Therapeutic Activity  TA 1: mini squats 2 x 10  TA 2: standing hip and knee flexion 2 x 10  TA 3: sit<>stand 3 x 5 reps  TA 4: heel raises 2 x 10    Time Entry(in minutes):  Hot/Cold Pack Time Entry: 6  Neuromuscular Re-Education Time Entry: " 10  Therapeutic Activity Time Entry: 14  Therapeutic Exercise Time Entry: 15    Assessment & Plan   Assessment: Patient progressed through therapy tasks with minimal cues from PT for visual and verbal cues for increased eccentric control and decreased compensations. PT noted increased RLE swelling today. Patient reports that she had been up on her feet a lot this weekend. Patient had no reports of increased pain or adverse effects to therapy tasks.   Evaluation/Treatment Tolerance: Patient tolerated treatment well    The patient will continue to benefit from skilled outpatient physical therapy in order to address the deficits listed in the problem list on the initial evaluation, provide patient and family education, and maximize the patients level of independence in the home and community environments.     The patient's spiritual, cultural, and educational needs were considered, and the patient is agreeable to the plan of care and goals.           Plan: Continue per POC and progress as pt able    Goals:   Active       Long Term Goals        Pt will increase R knee flexion to 120, knee extension to 0 degrees, and quad lag to 0 degrees to allow patient normal gait pattern.       Start:  07/10/25    Expected End:  08/08/25            Pt will increase R knee strength to 4+/5 to allow patient to safely return to prior level of function        Start:  07/10/25    Expected End:  08/08/25            Pt will ambulate with increased gait speed to greater than or equal to 3.0 feet per second for increased safety with community ambulation.       Start:  07/10/25    Expected End:  08/08/25            Pt will complete 5 x sit <> less than or equal to 15 seconds indicating increased lower extremity power for functional transfers.       Start:  07/10/25    Expected End:  08/08/25            Patient will independently ambulate community distances with no assistive device for return to PLOF.        Start:  07/10/25     Expected End:  08/08/25            Patient will have increased FOTO score to greater than or equal to 57% indicating increased function.       Start:  07/10/25    Expected End:  08/08/25               Short Term Goals        Patient will independently complete home exercise program with correct form.         Start:  07/10/25    Expected End:  08/08/25            Patient will report decreased R knee pain to less than or equal to 4/10 for improved quality of life.        Start:  07/10/25    Expected End:  08/08/25            Pt will increase R knee flexion to 90, knee extension to 0 degrees, and quad lag to 0 degrees to allow patient improved gait pattern.       Start:  07/10/25    Expected End:  08/08/25                      Patient will transfer sit<>stand without UE use for increased independence with functional transfers.        Start:  07/10/25    Expected End:  08/08/25                Alfredo Christine, PT, DPT

## 2025-07-23 ENCOUNTER — CLINICAL SUPPORT (OUTPATIENT)
Dept: REHABILITATION | Facility: HOSPITAL | Age: 55
End: 2025-07-23
Payer: COMMERCIAL

## 2025-07-23 DIAGNOSIS — R26.2 DIFFICULTY WALKING: ICD-10-CM

## 2025-07-23 DIAGNOSIS — Z96.651 AFTERCARE FOLLOWING RIGHT KNEE JOINT REPLACEMENT SURGERY: ICD-10-CM

## 2025-07-23 DIAGNOSIS — Z96.651 PRESENCE OF RIGHT ARTIFICIAL KNEE JOINT: Primary | ICD-10-CM

## 2025-07-23 DIAGNOSIS — Z47.1 AFTERCARE FOLLOWING RIGHT KNEE JOINT REPLACEMENT SURGERY: ICD-10-CM

## 2025-07-23 DIAGNOSIS — R53.1 WEAKNESS: ICD-10-CM

## 2025-07-23 PROCEDURE — 97530 THERAPEUTIC ACTIVITIES: CPT

## 2025-07-23 PROCEDURE — 97112 NEUROMUSCULAR REEDUCATION: CPT

## 2025-07-23 PROCEDURE — 97110 THERAPEUTIC EXERCISES: CPT

## 2025-07-23 PROCEDURE — 97010 HOT OR COLD PACKS THERAPY: CPT

## 2025-07-23 NOTE — PROGRESS NOTES
"  Outpatient Rehab    Physical Therapy Visit    Patient Name: Mary Johansen  MRN: [id number]  YOB: 1970  Encounter Date: 7/23/2025    Therapy Diagnosis:   Encounter Diagnoses   Name Primary?    Presence of right artificial knee joint Yes    Aftercare following right knee joint replacement surgery     Weakness     Difficulty walking      Physician: Jg Driscoll MD    Physician Orders: Eval and Treat  Medical Diagnosis: Presence of right artificial knee joint  Surgical Diagnosis: s/p R TKA   Surgical Date: 6/23/2025  Days Since Last Surgery: 30    Visit # / Visits Authorized:  6 / 12  Insurance Authorization Period: 7/10/2025 to 7/11/2027  Date of Evaluation: 7/10/2025  Plan of Care Certification: 7/10/2025 to 8/8/2025      PT/PTA: PT   Number of PTA visits since last PT visit:0  Time In: 1453   Time Out: 1542  Total Time (in minutes): 49   Total Billable Time (in minutes): 49    FOTO:  Intake Score (%): 45  Survey Score 2 (%): Not applicable for this Episode  Survey Score 3 (%): Not applicable for this Episode    Precautions:     R LE weight bearing as tolerated     Subjective   "I have been up on it a lot in the house. It's a little swollen and feels hot.".  Pain reported as 3/10. R knee    Objective       Knee Range of Motion   Right Knee   Active (deg) Passive (deg) Pain   Flexion 95       Extension 4                       Treatment:  Therapeutic Exercise  TE 1: nustep x 8 minutes  TE 2: wedge: 2 min  TE 3: seated HS stretch, RLE: 3 x 20 sec hold  TE 4: seated heel slides 2 x 10  TE 5: supine heel slides 2 x 10  TE 6: HS curls 2 x 10 green band  Balance/Neuromuscular Re-Education  NMR 1: quad sets: 20 x 3 sec hold  NMR 2: SAQs 3 x 10 3 sec hold  NMR 3: LAQs 2 x 10 3"  NMR 4: SLR 2 x 10  NMR 5: Sidelying hip ABD 2 x 10  Therapeutic Activity  TA 1: mini squats 2 x 10  TA 2: standing hip and knee flexion 2 x 10  TA 3: sit<>stand 3 x 5 reps  TA 4: heel raises 2 x 10    Time Entry(in minutes):   "     Assessment & Plan   Assessment: PT noted increased edema in patient's R knee compared to previous sessions. PT completed assessment of R lower extremity for possible blood clot and noted that patient had no reports of R calf pain or pain with ankle DF. Patient continues to demonstrate increasing R knee active range of motion. Patient had no reports of increased pain or adverse effects to therapy tasks today.   Evaluation/Treatment Tolerance: Patient tolerated treatment well    The patient will continue to benefit from skilled outpatient physical therapy in order to address the deficits listed in the problem list on the initial evaluation, provide patient and family education, and maximize the patients level of independence in the home and community environments.     The patient's spiritual, cultural, and educational needs were considered, and the patient is agreeable to the plan of care and goals.           Plan: Continue per POC and progress as pt able.    Goals:   Active       Long Term Goals        Pt will increase R knee flexion to 120, knee extension to 0 degrees, and quad lag to 0 degrees to allow patient normal gait pattern.       Start:  07/10/25    Expected End:  08/08/25            Pt will increase R knee strength to 4+/5 to allow patient to safely return to prior level of function        Start:  07/10/25    Expected End:  08/08/25            Pt will ambulate with increased gait speed to greater than or equal to 3.0 feet per second for increased safety with community ambulation.       Start:  07/10/25    Expected End:  08/08/25            Pt will complete 5 x sit <> less than or equal to 15 seconds indicating increased lower extremity power for functional transfers.       Start:  07/10/25    Expected End:  08/08/25            Patient will independently ambulate community distances with no assistive device for return to PLOF.        Start:  07/10/25    Expected End:  08/08/25            Patient  will have increased FOTO score to greater than or equal to 57% indicating increased function.       Start:  07/10/25    Expected End:  08/08/25               Short Term Goals        Patient will independently complete home exercise program with correct form.         Start:  07/10/25    Expected End:  08/08/25            Patient will report decreased R knee pain to less than or equal to 4/10 for improved quality of life.        Start:  07/10/25    Expected End:  08/08/25            Pt will increase R knee flexion to 90, knee extension to 0 degrees, and quad lag to 0 degrees to allow patient improved gait pattern.       Start:  07/10/25    Expected End:  08/08/25                      Patient will transfer sit<>stand without UE use for increased independence with functional transfers.        Start:  07/10/25    Expected End:  08/08/25                Alfredo Christine, PT, DPT

## 2025-07-24 ENCOUNTER — CLINICAL SUPPORT (OUTPATIENT)
Dept: REHABILITATION | Facility: HOSPITAL | Age: 55
End: 2025-07-24
Payer: COMMERCIAL

## 2025-07-24 DIAGNOSIS — Z96.651 AFTERCARE FOLLOWING RIGHT KNEE JOINT REPLACEMENT SURGERY: ICD-10-CM

## 2025-07-24 DIAGNOSIS — Z47.1 AFTERCARE FOLLOWING RIGHT KNEE JOINT REPLACEMENT SURGERY: ICD-10-CM

## 2025-07-24 DIAGNOSIS — R26.2 DIFFICULTY WALKING: ICD-10-CM

## 2025-07-24 DIAGNOSIS — Z96.651 PRESENCE OF RIGHT ARTIFICIAL KNEE JOINT: Primary | ICD-10-CM

## 2025-07-24 DIAGNOSIS — R53.1 WEAKNESS: ICD-10-CM

## 2025-07-24 PROCEDURE — 97112 NEUROMUSCULAR REEDUCATION: CPT

## 2025-07-24 PROCEDURE — 97010 HOT OR COLD PACKS THERAPY: CPT

## 2025-07-24 PROCEDURE — 97110 THERAPEUTIC EXERCISES: CPT

## 2025-07-24 PROCEDURE — 97530 THERAPEUTIC ACTIVITIES: CPT

## 2025-07-24 NOTE — PROGRESS NOTES
Outpatient Rehab    Physical Therapy Visit    Patient Name: Mary Johansen  MRN: [id number]  YOB: 1970  Encounter Date: 7/24/2025    Therapy Diagnosis:   Encounter Diagnoses   Name Primary?    Presence of right artificial knee joint Yes    Aftercare following right knee joint replacement surgery     Weakness     Difficulty walking      Physician: Jg Driscoll MD    Physician Orders: Eval and Treat  Medical Diagnosis: Presence of right artificial knee joint  Surgical Diagnosis: s/p R TKA   Surgical Date: 6/23/2025  Days Since Last Surgery: 31    Visit # / Visits Authorized:  8 / 12  Insurance Authorization Period: 7/10/2025 to 7/11/2027  Date of Evaluation: 7/10/2025  Plan of Care Certification: 7/10/2025 to 8/8/2025      PT/PTA: PT   Number of PTA visits since last PT visit:0  Time In: 1230   Time Out: 1323  Total Time (in minutes): 53   Total Billable Time (in minutes): 53    FOTO:  Intake Score (%): 45  Survey Score 2 (%): Not applicable for this Episode  Survey Score 3 (%): Not applicable for this Episode    Precautions:         Subjective   Reports that she found a cane at her house that she has been using. Says that she feels okay with the cane but that she just needs to practice with it a little more. She says that her knee gives her the most trouble at night..         Objective       Knee Range of Motion   Right Knee   Active (deg) Passive (deg) Pain   Flexion 90 (supine)       Extension -2           Lagged to about -10 degrees with R SLR 7/24/2025            Treatment:  Therapeutic Exercise  TE 1: nustep x 6 minutes  TE 2: wedge: 2 min  TE 3: seated HS stretch, RLE: 3 x 20 sec hold  TE 4: HS curls: 20  TE 5: heel raises: 30  Balance/Neuromuscular Re-Education  NMR 1: Turkmen E-stim: quad sets, SLR, and SAQ; 3 min each; 10 on, 20 off; 1 sec ramp time  NMR 2: long bridge on ball: 20  Therapeutic Activity  TA 1: mini squats 2 x 10  TA 2: step ups, 6 inch, forward and lateral: 10 each  way    Time Entry(in minutes):  Hot/Cold Pack Time Entry: 8  Neuromuscular Re-Education Time Entry: 12  Therapeutic Activity Time Entry: 12  Therapeutic Exercise Time Entry: 21    Assessment & Plan   Assessment: Performed Russian E-stim to the R knee today due to pt's lag with SLR. Tolerated Russian well, and performed new functional strengthening activities well. Educated on proper cane placement as pt was ambulating with cane on the surgical side. Ended with ice to the R knee. Skin is intact post modalities. Will continue to progress as able and as tolerated in coming visits.        The patient will continue to benefit from skilled outpatient physical therapy in order to address the deficits listed in the problem list on the initial evaluation, provide patient and family education, and maximize the patients level of independence in the home and community environments.     The patient's spiritual, cultural, and educational needs were considered, and the patient is agreeable to the plan of care and goals.           Plan: Continue per POC and progress as pt able.    Goals:   Active       Long Term Goals        Pt will increase R knee flexion to 120, knee extension to 0 degrees, and quad lag to 0 degrees to allow patient normal gait pattern.       Start:  07/10/25    Expected End:  08/08/25            Pt will increase R knee strength to 4+/5 to allow patient to safely return to prior level of function        Start:  07/10/25    Expected End:  08/08/25            Pt will ambulate with increased gait speed to greater than or equal to 3.0 feet per second for increased safety with community ambulation.       Start:  07/10/25    Expected End:  08/08/25            Pt will complete 5 x sit <> less than or equal to 15 seconds indicating increased lower extremity power for functional transfers.       Start:  07/10/25    Expected End:  08/08/25            Patient will independently ambulate community distances with no  assistive device for return to PLOF.        Start:  07/10/25    Expected End:  08/08/25            Patient will have increased FOTO score to greater than or equal to 57% indicating increased function.       Start:  07/10/25    Expected End:  08/08/25               Short Term Goals        Patient will independently complete home exercise program with correct form.         Start:  07/10/25    Expected End:  08/08/25            Patient will report decreased R knee pain to less than or equal to 4/10 for improved quality of life.        Start:  07/10/25    Expected End:  08/08/25            Pt will increase R knee flexion to 90, knee extension to 0 degrees, and quad lag to 0 degrees to allow patient improved gait pattern.       Start:  07/10/25    Expected End:  08/08/25                      Patient will transfer sit<>stand without UE use for increased independence with functional transfers.        Start:  07/10/25    Expected End:  08/08/25                Hossein Rahman, PT, DPT  7/24/2025

## 2025-07-28 ENCOUNTER — CLINICAL SUPPORT (OUTPATIENT)
Dept: REHABILITATION | Facility: HOSPITAL | Age: 55
End: 2025-07-28
Payer: COMMERCIAL

## 2025-07-28 DIAGNOSIS — R26.2 DIFFICULTY WALKING: ICD-10-CM

## 2025-07-28 DIAGNOSIS — Z96.651 PRESENCE OF RIGHT ARTIFICIAL KNEE JOINT: Primary | ICD-10-CM

## 2025-07-28 DIAGNOSIS — Z96.651 AFTERCARE FOLLOWING RIGHT KNEE JOINT REPLACEMENT SURGERY: ICD-10-CM

## 2025-07-28 DIAGNOSIS — Z47.1 AFTERCARE FOLLOWING RIGHT KNEE JOINT REPLACEMENT SURGERY: ICD-10-CM

## 2025-07-28 DIAGNOSIS — R53.1 WEAKNESS: ICD-10-CM

## 2025-07-28 PROCEDURE — 97530 THERAPEUTIC ACTIVITIES: CPT | Mod: CQ

## 2025-07-28 PROCEDURE — 97110 THERAPEUTIC EXERCISES: CPT | Mod: CQ

## 2025-07-28 PROCEDURE — 97010 HOT OR COLD PACKS THERAPY: CPT | Mod: CQ

## 2025-07-28 PROCEDURE — 97112 NEUROMUSCULAR REEDUCATION: CPT | Mod: CQ

## 2025-07-28 NOTE — PROGRESS NOTES
"  Outpatient Rehab    Physical Therapy Visit    Patient Name: Mary Johansen  MRN: [id number]  YOB: 1970  Encounter Date: 7/28/2025    Therapy Diagnosis:   Encounter Diagnoses   Name Primary?    Presence of right artificial knee joint Yes    Aftercare following right knee joint replacement surgery     Weakness     Difficulty walking      Physician: Jg Driscoll MD    Physician Orders: Eval and Treat  Medical Diagnosis: Presence of right artificial knee joint  Surgical Diagnosis: s/p R TKA   Surgical Date: 6/23/2025  Days Since Last Surgery: 35    Visit # / Visits Authorized:  9 / 12  Insurance Authorization Period: 7/10/2025 to 7/11/2027  Date of Evaluation: 7/10/2025  Plan of Care Certification: 7/10/2025 to 8/8/2025      PT/PTA:     Number of PTA visits since last PT visit: 1/5  Time In: 1015   Time Out: 1105  Total Time (in minutes): 50   Total Billable Time (in minutes): 45    FOTO:  Intake Score (%): 45  Survey Score 2 (%): Not applicable for this Episode  Survey Score 3 (%): Not applicable for this Episode    Continue per Plan of Care per Hossein Rahman, PT     Subjective   stiff, tight, sore.  Pain reported as 5/10. R knee    Objective       Knee Range of Motion   Right Knee   Active (deg) Passive (deg) Pain   Flexion 97       Extension 0                       Treatment:  Therapeutic Exercise  TE 1: nustep x 8 minutes  TE 2: wedge: 2 min  TE 3: standing at steps for HS stretch w/ alternating knee flex stretch, RLE: 3 x 20 sec hold  TE 7: seated towel slides for knee flex x 20 reps  Balance/Neuromuscular Re-Education  NMR 3: LAQs 2 x 10 3"  NMR 4: SLR 3 x 10  NMR 6: quad sets x 30  NMR 7: standing BLE Ex x 20 reps each: marching, heel/toe raises, hip abd, hip ext  Therapeutic Activity  TA 1: mini squats 2 x 10  TA 2: step ups, 6 inch, forward and lateral: 10 each way (not today)  TA 3: sit<>stand 2x10  Modalities  Cryotherapy (Minutes\Location): 5 min to R knee      Time Entry(in " minutes):  Hot/Cold Pack Time Entry: 5  Neuromuscular Re-Education Time Entry: 15  Therapeutic Activity Time Entry: 10  Therapeutic Exercise Time Entry: 20    Assessment & Plan   Assessment: Improving flexion and extension range of motion as noted; needed strap assist for straight leg raises today  Evaluation/Treatment Tolerance: Patient tolerated treatment well    The patient will continue to benefit from skilled outpatient physical therapy in order to address the deficits listed in the problem list on the initial evaluation, provide patient and family education, and maximize the patients level of independence in the home and community environments.         Plan: Continue per POC and progress as pt able.    Goals:   Active       Long Term Goals        Pt will increase R knee flexion to 120, knee extension to 0 degrees, and quad lag to 0 degrees to allow patient normal gait pattern.       Start:  07/10/25    Expected End:  08/08/25            Pt will increase R knee strength to 4+/5 to allow patient to safely return to prior level of function        Start:  07/10/25    Expected End:  08/08/25            Pt will ambulate with increased gait speed to greater than or equal to 3.0 feet per second for increased safety with community ambulation.       Start:  07/10/25    Expected End:  08/08/25            Pt will complete 5 x sit <> less than or equal to 15 seconds indicating increased lower extremity power for functional transfers.       Start:  07/10/25    Expected End:  08/08/25            Patient will independently ambulate community distances with no assistive device for return to PLOF.        Start:  07/10/25    Expected End:  08/08/25            Patient will have increased FOTO score to greater than or equal to 57% indicating increased function.       Start:  07/10/25    Expected End:  08/08/25               Short Term Goals        Patient will independently complete home exercise program with correct  form.         Start:  07/10/25    Expected End:  08/08/25            Patient will report decreased R knee pain to less than or equal to 4/10 for improved quality of life.        Start:  07/10/25    Expected End:  08/08/25            Pt will increase R knee flexion to 90, knee extension to 0 degrees, and quad lag to 0 degrees to allow patient improved gait pattern.       Start:  07/10/25    Expected End:  08/08/25                      Patient will transfer sit<>stand without UE use for increased independence with functional transfers.        Start:  07/10/25    Expected End:  08/08/25                Hillary Bautista, PTA

## 2025-07-30 ENCOUNTER — CLINICAL SUPPORT (OUTPATIENT)
Dept: REHABILITATION | Facility: HOSPITAL | Age: 55
End: 2025-07-30
Payer: COMMERCIAL

## 2025-07-30 DIAGNOSIS — Z96.651 PRESENCE OF RIGHT ARTIFICIAL KNEE JOINT: Primary | ICD-10-CM

## 2025-07-30 DIAGNOSIS — R53.1 WEAKNESS: ICD-10-CM

## 2025-07-30 DIAGNOSIS — Z47.1 AFTERCARE FOLLOWING RIGHT KNEE JOINT REPLACEMENT SURGERY: ICD-10-CM

## 2025-07-30 DIAGNOSIS — R26.2 DIFFICULTY WALKING: ICD-10-CM

## 2025-07-30 DIAGNOSIS — Z96.651 AFTERCARE FOLLOWING RIGHT KNEE JOINT REPLACEMENT SURGERY: ICD-10-CM

## 2025-07-30 PROCEDURE — 97010 HOT OR COLD PACKS THERAPY: CPT

## 2025-07-30 PROCEDURE — 97112 NEUROMUSCULAR REEDUCATION: CPT

## 2025-07-30 PROCEDURE — 97530 THERAPEUTIC ACTIVITIES: CPT

## 2025-07-30 PROCEDURE — 97110 THERAPEUTIC EXERCISES: CPT

## 2025-07-30 NOTE — PROGRESS NOTES
"  Outpatient Rehab    Physical Therapy Visit    Patient Name: Mary Johansen  MRN: [id number]  YOB: 1970  Encounter Date: 7/30/2025    Therapy Diagnosis:   Encounter Diagnoses   Name Primary?    Presence of right artificial knee joint Yes    Aftercare following right knee joint replacement surgery     Weakness     Difficulty walking      Physician: Jg Driscoll MD    Physician Orders: Eval and Treat  Medical Diagnosis: Presence of right artificial knee joint  Surgical Diagnosis: s/p R TKA   Surgical Date: 6/23/2025  Days Since Last Surgery: 37    Visit # / Visits Authorized:  9 / 12  Insurance Authorization Period: 7/10/2025 to 7/11/2027  Date of Evaluation: 7/10/2025  Plan of Care Certification: 7/10/2025 to 8/8/2025      PT/PTA: PT   Number of PTA visits since last PT visit:0  Time In: 1015   Time Out: 1106  Total Time (in minutes): 51   Total Billable Time (in minutes): 43    FOTO:  Intake Score (%): 45  Survey Score 2 (%): Not applicable for this Episode  Survey Score 3 (%): Not applicable for this Episode    Precautions:   N/A      Subjective   Patient reports improved quality of rest at night..  Pain reported as 3/10. R knee    Objective       Knee Range of Motion   Right Knee   Active (deg) Passive (deg) Pain   Flexion 100       Extension 0                       Treatment:  Therapeutic Exercise  TE 1: bike x 8 minutes  TE 2: wedge: 2 min  TE 3: knee flexion stretch standing at steps 10 x 10"  TE 5: supine heelslides 2 x 10 (100 degrees)  TE 6: HS curls 2 x 10 green band  TE 7: seated towel slides for knee flex x 20 reps  Balance/Neuromuscular Re-Education  NMR 2: Contract relax quads to increase knee flexion ROM  NMR 3: LAQs 2 x 10 1.5#  NMR 4: SLR 2 x 10  NMR 5: Sidelying hip ABD 2 x 10  NMR 6: quad sets x 30  Therapeutic Activity  TA 2: step ups, 6 inch, forward and lateral: 2 x 10 each way  Modalities  Cryotherapy (Minutes\Location): 8 min to R knee with elevation above heart " level    Time Entry(in minutes):  Hot/Cold Pack Time Entry: 8  Neuromuscular Re-Education Time Entry: 13  Therapeutic Activity Time Entry: 15  Therapeutic Exercise Time Entry: 15    Assessment & Plan   Assessment: Patient progressed to rec bike today and was able to complete full revolutions. She demonstrates increased R knee active range of motion and improving R quad control with exercises. No adverse effects noted to PT tasks.   Evaluation/Treatment Tolerance: Patient tolerated treatment well    The patient will continue to benefit from skilled outpatient physical therapy in order to address the deficits listed in the problem list on the initial evaluation, provide patient and family education, and maximize the patients level of independence in the home and community environments.     The patient's spiritual, cultural, and educational needs were considered, and the patient is agreeable to the plan of care and goals.           Plan: Continue per POC and progress as pt able.    Goals:   Active       Long Term Goals        Pt will increase R knee flexion to 120, knee extension to 0 degrees, and quad lag to 0 degrees to allow patient normal gait pattern.       Start:  07/10/25    Expected End:  08/08/25            Pt will increase R knee strength to 4+/5 to allow patient to safely return to prior level of function        Start:  07/10/25    Expected End:  08/08/25            Pt will ambulate with increased gait speed to greater than or equal to 3.0 feet per second for increased safety with community ambulation.       Start:  07/10/25    Expected End:  08/08/25            Pt will complete 5 x sit <> less than or equal to 15 seconds indicating increased lower extremity power for functional transfers.       Start:  07/10/25    Expected End:  08/08/25            Patient will independently ambulate community distances with no assistive device for return to PLOF.        Start:  07/10/25    Expected End:  08/08/25             Patient will have increased FOTO score to greater than or equal to 57% indicating increased function.       Start:  07/10/25    Expected End:  08/08/25               Short Term Goals        Patient will independently complete home exercise program with correct form.         Start:  07/10/25    Expected End:  08/08/25            Patient will report decreased R knee pain to less than or equal to 4/10 for improved quality of life.        Start:  07/10/25    Expected End:  08/08/25            Pt will increase R knee flexion to 90, knee extension to 0 degrees, and quad lag to 0 degrees to allow patient improved gait pattern.       Start:  07/10/25    Expected End:  08/08/25                      Patient will transfer sit<>stand without UE use for increased independence with functional transfers.        Start:  07/10/25    Expected End:  08/08/25                Alfredo Christine, PT, DPT

## 2025-08-01 ENCOUNTER — CLINICAL SUPPORT (OUTPATIENT)
Dept: REHABILITATION | Facility: HOSPITAL | Age: 55
End: 2025-08-01
Payer: COMMERCIAL

## 2025-08-01 DIAGNOSIS — R53.1 WEAKNESS: ICD-10-CM

## 2025-08-01 DIAGNOSIS — Z96.651 PRESENCE OF RIGHT ARTIFICIAL KNEE JOINT: Primary | ICD-10-CM

## 2025-08-01 DIAGNOSIS — R26.2 DIFFICULTY WALKING: ICD-10-CM

## 2025-08-01 DIAGNOSIS — Z96.651 AFTERCARE FOLLOWING RIGHT KNEE JOINT REPLACEMENT SURGERY: ICD-10-CM

## 2025-08-01 DIAGNOSIS — Z47.1 AFTERCARE FOLLOWING RIGHT KNEE JOINT REPLACEMENT SURGERY: ICD-10-CM

## 2025-08-01 PROCEDURE — 97110 THERAPEUTIC EXERCISES: CPT | Mod: CQ

## 2025-08-01 PROCEDURE — 97530 THERAPEUTIC ACTIVITIES: CPT | Mod: CQ

## 2025-08-01 PROCEDURE — 97010 HOT OR COLD PACKS THERAPY: CPT | Mod: CQ

## 2025-08-01 PROCEDURE — 97112 NEUROMUSCULAR REEDUCATION: CPT | Mod: CQ

## 2025-08-01 NOTE — PROGRESS NOTES
"  Outpatient Rehab    Physical Therapy Visit    Patient Name: Mary Johansen  MRN: [id number]  YOB: 1970  Encounter Date: 8/1/2025    Therapy Diagnosis:   Encounter Diagnoses   Name Primary?    Presence of right artificial knee joint Yes    Aftercare following right knee joint replacement surgery     Weakness     Difficulty walking      Physician: Jg Driscoll MD    Physician Orders: Eval and Treat  Medical Diagnosis: Presence of right artificial knee joint  Surgical Diagnosis: s/p R TKA   Surgical Date: 6/23/2025  Days Since Last Surgery: 39    Visit # / Visits Authorized:  11/12  Insurance Authorization Period: 7/10/2025 to 7/11/2027  Date of Evaluation: 7/10/2025  Plan of Care Certification: 7/10/2025 to 8/8/2025      PT/PTA: PTA   Number of PTA visits since last PT visit:1  Time In: 1020   Time Out: 1115  Total Time (in minutes): 55   Total Billable Time (in minutes): 45    FOTO:  Intake Score (%): 45  Survey Score 2 (%): Not applicable for this Episode  Survey Score 3 (%): Not applicable for this Episode    Precautions:         Subjective   Patient reports "For some reason my knee really swelled up yesterday".  Patient reports she applied and ice and elevated several times throughout the day..  Pain reported as 3/10. R knee    Objective            Treatment:  Therapeutic Exercise  TE 1: bike x 8 minutes  TE 2: wedge: 2 min  TE 3: knee flexion stretch standing at steps 10 x 10"  TE 4: HS curls: 20, green tband  TE 5: supine heelslides 2 x 10 (102 degrees)  TE 7: seated towel slides for knee flex x 20 reps (knee flexion 98 arom )  Balance/Neuromuscular Re-Education  NMR 3: LAQs 2 x 10, 2 #  NMR 4: SLR 2 x 10 cues for eccentric hold  NMR 5: Sidelying hip ABD 2 x 10  NMR 6: quad sets x 30  Therapeutic Activity  TA 1: mini squats 2 x 10  TA 2: step ups, 6 inch, forward and lateral: 2 x 10 each way  TA 3: sit<>stand 2x10  TA 4: heel raises 2 x 10  TA 5: Standing hip abduction 2 x 10;  standing " hip extension 2 x 10  Modalities  Cryotherapy (Minutes\Location): 10 min to R knee with elevation above heart level    Time Entry(in minutes):  Hot/Cold Pack Time Entry: 10  Neuromuscular Re-Education Time Entry: 15  Therapeutic Activity Time Entry: 15  Therapeutic Exercise Time Entry: 15    Assessment & Plan   Assessment: Noted moderate redness and edema local to Right knee.  No open areas noted at healing incision.  Right knee active range of motion flexion slightly increased from previous PT treatment session.  Focused on eccentric control of Right Quad with supine straight leg raises'.  No adverse effects noted to PT treatment session.        The patient will continue to benefit from skilled outpatient physical therapy in order to address the deficits listed in the problem list on the initial evaluation, provide patient and family education, and maximize the patients level of independence in the home and community environments.     The patient's spiritual, cultural, and educational needs were considered, and the patient is agreeable to the plan of care and goals.     Education  Education was done with Patient. The patient's learning style includes Demonstration and Listening. The patient Demonstrates understanding and Verbalizes understanding.         Plan of Care; Home exercise program; Delayed onset muscle soreness        Plan: Continue per POC and progress as pt able.    Goals:   Active       Long Term Goals        Pt will increase R knee flexion to 120, knee extension to 0 degrees, and quad lag to 0 degrees to allow patient normal gait pattern.       Start:  07/10/25    Expected End:  08/08/25            Pt will increase R knee strength to 4+/5 to allow patient to safely return to prior level of function        Start:  07/10/25    Expected End:  08/08/25            Pt will ambulate with increased gait speed to greater than or equal to 3.0 feet per second for increased safety with community ambulation.        Start:  07/10/25    Expected End:  08/08/25            Pt will complete 5 x sit <> less than or equal to 15 seconds indicating increased lower extremity power for functional transfers.       Start:  07/10/25    Expected End:  08/08/25            Patient will independently ambulate community distances with no assistive device for return to PLOF.        Start:  07/10/25    Expected End:  08/08/25            Patient will have increased FOTO score to greater than or equal to 57% indicating increased function.       Start:  07/10/25    Expected End:  08/08/25               Short Term Goals        Patient will independently complete home exercise program with correct form.         Start:  07/10/25    Expected End:  08/08/25            Patient will report decreased R knee pain to less than or equal to 4/10 for improved quality of life.        Start:  07/10/25    Expected End:  08/08/25            Pt will increase R knee flexion to 90, knee extension to 0 degrees, and quad lag to 0 degrees to allow patient improved gait pattern.       Start:  07/10/25    Expected End:  08/08/25                      Patient will transfer sit<>stand without UE use for increased independence with functional transfers.        Start:  07/10/25    Expected End:  08/08/25                Norma Cope, PTA 8/1/2025

## 2025-08-04 ENCOUNTER — CLINICAL SUPPORT (OUTPATIENT)
Dept: REHABILITATION | Facility: HOSPITAL | Age: 55
End: 2025-08-04
Payer: COMMERCIAL

## 2025-08-04 DIAGNOSIS — Z47.1 AFTERCARE FOLLOWING RIGHT KNEE JOINT REPLACEMENT SURGERY: ICD-10-CM

## 2025-08-04 DIAGNOSIS — R53.1 WEAKNESS: ICD-10-CM

## 2025-08-04 DIAGNOSIS — Z96.651 PRESENCE OF RIGHT ARTIFICIAL KNEE JOINT: Primary | ICD-10-CM

## 2025-08-04 DIAGNOSIS — Z96.651 AFTERCARE FOLLOWING RIGHT KNEE JOINT REPLACEMENT SURGERY: ICD-10-CM

## 2025-08-04 DIAGNOSIS — R26.2 DIFFICULTY WALKING: ICD-10-CM

## 2025-08-04 PROCEDURE — 97014 ELECTRIC STIMULATION THERAPY: CPT | Mod: CQ

## 2025-08-04 PROCEDURE — 97140 MANUAL THERAPY 1/> REGIONS: CPT | Mod: CQ

## 2025-08-04 PROCEDURE — 97110 THERAPEUTIC EXERCISES: CPT | Mod: CQ

## 2025-08-04 PROCEDURE — 97010 HOT OR COLD PACKS THERAPY: CPT | Mod: CQ

## 2025-08-04 PROCEDURE — 97112 NEUROMUSCULAR REEDUCATION: CPT | Mod: CQ

## 2025-08-04 NOTE — PROGRESS NOTES
Outpatient Rehab    Physical Therapy Visit    Patient Name: Mary Johansen  MRN: [id number]  YOB: 1970  Encounter Date: 8/4/2025    Therapy Diagnosis:   Encounter Diagnoses   Name Primary?    Presence of right artificial knee joint Yes    Aftercare following right knee joint replacement surgery     Weakness     Difficulty walking      Physician: Jg Driscoll MD    Physician Orders: Eval and Treat  Medical Diagnosis: Presence of right artificial knee joint  Surgical Diagnosis: s/p R TKA   Surgical Date: 6/23/2025  Days Since Last Surgery: 42    Visit # / Visits Authorized:  12 / 16  Insurance Authorization Period: 7/10/2025 to 7/11/2027  Date of Evaluation: 7/10/2025  Plan of Care Certification: 7/10/2025 to 8/8/2025      PT/PTA:     Number of PTA visits since last PT visit: 2/5  Time In: 1015   Time Out: 1115  Total Time (in minutes): 60   Total Billable Time (in minutes): 55 (+5 min set up)    FOTO:  Intake Score (%): 45  Survey Score 2 (%): Not applicable for this Episode  Survey Score 3 (%): Not applicable for this Episode    Continue per Plan of Care per Cuca Christine, PT       Subjective   pain as noted in R Knee w/ swelling increased over the weekend; pt denies taking pain medication.  Pain reported as 2/10. R knee    Objective       Knee Range of Motion   Right Knee   Active (deg) Passive (deg) Pain   Flexion 100       Extension -4           10 degrees quad lag w/ SLR; -4 degrees extension to 100 degrees flexion            Treatment:  Therapeutic Exercise  TE 1: bike x 8 minutes  TE 2: wedge: 2 min  TE 3: seated HS stretching, 3x20 s/h  TE 5: supine heelslides 2 x 10; +10 more later after getting stiff  TE 7: seated towel slides for knee flex x 20 reps  Manual Therapy  MT 1: STM and scar massage to R knee  MT 2: patellar mobs to R knee  Balance/Neuromuscular Re-Education  NMR 4: SLR 2 x 10 cues for eccentric control  NMR 6: quad sets x 30  NMR 8: SAQs x 20 reps  Modalities  Cryotherapy  (Minutes\Location): 10 min game ready to R knee with elevation above heart level concurrent w/ stim  Electrical Stimulation (Parameters): symmetrical biphasic stim to R knee concurrent w/ game ready in supine w/ RLE elevated    Time Entry(in minutes):  E-Stim (Unattended) Time Entry: 10  Hot/Cold Pack Time Entry: 10  Manual Therapy Time Entry: 15  Neuromuscular Re-Education Time Entry: 15  Therapeutic Exercise Time Entry: 15    Assessment & Plan   Assessment: Pt lacks full terminal knee extension today and w/ persistent quad lag w/ straight leg raise; flexion about the same (2 degrees less than last session); pt limited by persistent edema and some warmth, not significant and no redness; (-) Homans; encouraged use of cane for a couple of days at least to offload that knee and to continue to use ice for pain/edema; compression stockings prn   Evaluation/Treatment Tolerance: Patient tolerated treatment well    The patient will continue to benefit from skilled outpatient physical therapy in order to address the deficits listed in the problem list on the initial evaluation, provide patient and family education, and maximize the patients level of independence in the home and community environments.         Plan: Continue per POC and progress as pt able.    Goals:   Active       Long Term Goals        Pt will increase R knee flexion to 120, knee extension to 0 degrees, and quad lag to 0 degrees to allow patient normal gait pattern.       Start:  07/10/25    Expected End:  08/08/25            Pt will increase R knee strength to 4+/5 to allow patient to safely return to prior level of function        Start:  07/10/25    Expected End:  08/08/25            Pt will ambulate with increased gait speed to greater than or equal to 3.0 feet per second for increased safety with community ambulation.       Start:  07/10/25    Expected End:  08/08/25            Pt will complete 5 x sit <> less than or equal to 15 seconds  indicating increased lower extremity power for functional transfers.       Start:  07/10/25    Expected End:  08/08/25            Patient will independently ambulate community distances with no assistive device for return to PLOF.        Start:  07/10/25    Expected End:  08/08/25            Patient will have increased FOTO score to greater than or equal to 57% indicating increased function.       Start:  07/10/25    Expected End:  08/08/25               Short Term Goals        Patient will independently complete home exercise program with correct form.         Start:  07/10/25    Expected End:  08/08/25            Patient will report decreased R knee pain to less than or equal to 4/10 for improved quality of life.        Start:  07/10/25    Expected End:  08/08/25            Pt will increase R knee flexion to 90, knee extension to 0 degrees, and quad lag to 0 degrees to allow patient improved gait pattern.       Start:  07/10/25    Expected End:  08/08/25                      Patient will transfer sit<>stand without UE use for increased independence with functional transfers.        Start:  07/10/25    Expected End:  08/08/25                Hillary Bautista, PTA

## 2025-08-12 ENCOUNTER — CLINICAL SUPPORT (OUTPATIENT)
Dept: REHABILITATION | Facility: HOSPITAL | Age: 55
End: 2025-08-12
Payer: COMMERCIAL

## 2025-08-12 DIAGNOSIS — Z47.1 AFTERCARE FOLLOWING RIGHT KNEE JOINT REPLACEMENT SURGERY: ICD-10-CM

## 2025-08-12 DIAGNOSIS — Z96.651 PRESENCE OF RIGHT ARTIFICIAL KNEE JOINT: Primary | ICD-10-CM

## 2025-08-12 DIAGNOSIS — Z96.651 AFTERCARE FOLLOWING RIGHT KNEE JOINT REPLACEMENT SURGERY: ICD-10-CM

## 2025-08-12 DIAGNOSIS — R53.1 WEAKNESS: ICD-10-CM

## 2025-08-12 DIAGNOSIS — R26.2 DIFFICULTY WALKING: ICD-10-CM

## 2025-08-12 PROCEDURE — 97110 THERAPEUTIC EXERCISES: CPT | Mod: CQ

## 2025-08-12 PROCEDURE — 97112 NEUROMUSCULAR REEDUCATION: CPT | Mod: CQ

## 2025-08-19 ENCOUNTER — CLINICAL SUPPORT (OUTPATIENT)
Dept: REHABILITATION | Facility: HOSPITAL | Age: 55
End: 2025-08-19
Payer: COMMERCIAL

## 2025-08-19 DIAGNOSIS — Z96.651 AFTERCARE FOLLOWING RIGHT KNEE JOINT REPLACEMENT SURGERY: ICD-10-CM

## 2025-08-19 DIAGNOSIS — R26.2 DIFFICULTY WALKING: ICD-10-CM

## 2025-08-19 DIAGNOSIS — Z47.1 AFTERCARE FOLLOWING RIGHT KNEE JOINT REPLACEMENT SURGERY: ICD-10-CM

## 2025-08-19 DIAGNOSIS — Z96.651 PRESENCE OF RIGHT ARTIFICIAL KNEE JOINT: Primary | ICD-10-CM

## 2025-08-19 DIAGNOSIS — R53.1 WEAKNESS: ICD-10-CM

## 2025-08-19 PROCEDURE — 97010 HOT OR COLD PACKS THERAPY: CPT

## 2025-08-19 PROCEDURE — 97530 THERAPEUTIC ACTIVITIES: CPT

## 2025-08-19 PROCEDURE — 97140 MANUAL THERAPY 1/> REGIONS: CPT

## 2025-08-19 PROCEDURE — 97110 THERAPEUTIC EXERCISES: CPT

## 2025-08-26 ENCOUNTER — CLINICAL SUPPORT (OUTPATIENT)
Dept: REHABILITATION | Facility: HOSPITAL | Age: 55
End: 2025-08-26
Payer: COMMERCIAL

## 2025-08-26 DIAGNOSIS — Z96.651 AFTERCARE FOLLOWING RIGHT KNEE JOINT REPLACEMENT SURGERY: ICD-10-CM

## 2025-08-26 DIAGNOSIS — Z47.1 AFTERCARE FOLLOWING RIGHT KNEE JOINT REPLACEMENT SURGERY: ICD-10-CM

## 2025-08-26 DIAGNOSIS — R26.2 DIFFICULTY WALKING: ICD-10-CM

## 2025-08-26 DIAGNOSIS — R53.1 WEAKNESS: ICD-10-CM

## 2025-08-26 DIAGNOSIS — Z96.651 PRESENCE OF RIGHT ARTIFICIAL KNEE JOINT: Primary | ICD-10-CM

## 2025-08-26 PROCEDURE — 97110 THERAPEUTIC EXERCISES: CPT | Mod: CQ

## 2025-08-26 PROCEDURE — 97010 HOT OR COLD PACKS THERAPY: CPT | Mod: CQ

## 2025-08-26 PROCEDURE — 97140 MANUAL THERAPY 1/> REGIONS: CPT | Mod: CQ

## 2025-09-01 PROBLEM — R53.1 WEAKNESS: Status: RESOLVED | Noted: 2025-07-17 | Resolved: 2025-09-01

## 2025-09-03 ENCOUNTER — CLINICAL SUPPORT (OUTPATIENT)
Dept: REHABILITATION | Facility: HOSPITAL | Age: 55
End: 2025-09-03
Payer: COMMERCIAL

## 2025-09-03 DIAGNOSIS — R26.2 DIFFICULTY WALKING: ICD-10-CM

## 2025-09-03 DIAGNOSIS — Z96.651 PRESENCE OF RIGHT ARTIFICIAL KNEE JOINT: Primary | ICD-10-CM

## 2025-09-03 DIAGNOSIS — Z47.1 AFTERCARE FOLLOWING RIGHT KNEE JOINT REPLACEMENT SURGERY: ICD-10-CM

## 2025-09-03 DIAGNOSIS — Z96.651 AFTERCARE FOLLOWING RIGHT KNEE JOINT REPLACEMENT SURGERY: ICD-10-CM

## 2025-09-03 PROCEDURE — 97530 THERAPEUTIC ACTIVITIES: CPT | Mod: CQ

## 2025-09-03 PROCEDURE — 97112 NEUROMUSCULAR REEDUCATION: CPT | Mod: CQ

## 2025-09-03 PROCEDURE — 97110 THERAPEUTIC EXERCISES: CPT | Mod: CQ

## (undated) DEVICE — PASSER SUTURE FIRSTPASS ST

## (undated) DEVICE — KIT IRR SUCTION HND PIECE

## (undated) DEVICE — GLOVE SENSICARE PI GRN 8

## (undated) DEVICE — APPLICATOR CHLORAPREP ORN 26ML

## (undated) DEVICE — GLOVE SENSICARE PI GRN 6.5

## (undated) DEVICE — SPACESUIT TOGA T5 ZIPPER PEEL

## (undated) DEVICE — DEVICE SUCTION H20 BROOM 12FT

## (undated) DEVICE — TUBING CROSSFLOW INFLOW CASS

## (undated) DEVICE — BLADE RECIP DOUBLE SIDED

## (undated) DEVICE — BLADE INCISOR + STR VIOL 4.5MM

## (undated) DEVICE — KIT TOTAL KNEE RUSH

## (undated) DEVICE — BURR CUT BN HELIX 4.5MM

## (undated) DEVICE — KIT TRACTION SHLDR ST DISP LF

## (undated) DEVICE — STAPLER SKIN WIDE

## (undated) DEVICE — GOWN POLY REINF BRTH SLV XL

## (undated) DEVICE — POUCH INSTRUMENT 2 POCKET

## (undated) DEVICE — SUT FIBERLINK TAPE BLU WHT 1.3

## (undated) DEVICE — DRAPE VELYS DEVICE STERILE

## (undated) DEVICE — WRAP SHLDR ACT 2 CLD PK L XL

## (undated) DEVICE — SYR 30CC LUER LOCK

## (undated) DEVICE — WAND COBLATION TURBOVAC 90

## (undated) DEVICE — GRASPER SUTURE 60 DEG 15CM PNK

## (undated) DEVICE — GLOVE SENSICARE PI SURG 7.5

## (undated) DEVICE — DRESSING MEPILEX HEEL 22X23CM

## (undated) DEVICE — DRAPE INCISE IOBAN 2 13X13IN

## (undated) DEVICE — GLOVE BIOGEL SKINSENSE PI 7.0

## (undated) DEVICE — GLOVE 7.0 PROTEXIS PI BLUE

## (undated) DEVICE — SOL NACL INJ 1000ML 0.9%

## (undated) DEVICE — GLOVE SENSICARE PI GRN 7

## (undated) DEVICE — GLOVE BIOGEL SKINSENSE PI 7.5

## (undated) DEVICE — DRESSING GAUZE XEROFORM 5X9

## (undated) DEVICE — UNDERGLOVE BIOGEL PI SZ 5.5

## (undated) DEVICE — PIN VELYS ARRAY DRILL 4X125MM
Type: IMPLANTABLE DEVICE | Site: KNEE | Status: NON-FUNCTIONAL
Removed: 2025-06-23

## (undated) DEVICE — MARKER SKIN RULER AND LABEL

## (undated) DEVICE — SOL NACL 0.9% IV INJ 500ML

## (undated) DEVICE — VELYS ROBOT-ASSISTED SOLUTION ARRAY DRILL PIN 175 MM X 4 MM
Type: IMPLANTABLE DEVICE | Site: KNEE | Status: NON-FUNCTIONAL
Brand: VELYS
Removed: 2025-06-23

## (undated) DEVICE — BOWL BONE CEMENT MIXING

## (undated) DEVICE — SET CYSTO IRR DRP CHMBR 84IN

## (undated) DEVICE — GLOVE 6.0 PROTEXIS PI BLUE

## (undated) DEVICE — DRAPE SURGICAL STERILE HD SHOULDER W/POUCH 59 X 99IN

## (undated) DEVICE — SPONGE COTTON TRAY 4X4IN

## (undated) DEVICE — STOCKINETTE IMPERVIOUS LG 9X48

## (undated) DEVICE — Device

## (undated) DEVICE — DRAPE INCISE IOBAN 2 23X23IN

## (undated) DEVICE — GLOVE 7.5 PROTEXIS PI BLUE

## (undated) DEVICE — NDL QUINCKE SPINAL 18G 3.5IN

## (undated) DEVICE — SOL NACL IRR 1000ML BTL

## (undated) DEVICE — KIT SHLDR POMIERSKIWATSON RUSH

## (undated) DEVICE — GLOVE 6.5 PROTEXIS PI BLUE

## (undated) DEVICE — SUT 2-0 VICRYL / CT-1

## (undated) DEVICE — SUT VICRYL CTD 1 27IN CP

## (undated) DEVICE — GLOVE SENSICARE PI SURG 7

## (undated) DEVICE — SET VELYS PURESIGHT ARRAY KNEE

## (undated) DEVICE — OVERLAY MATTRESS WAFFLE

## (undated) DEVICE — SUT MONOCRYL 3-0 PS-2 UND

## (undated) DEVICE — CANNULA THREADED 8.5 X 72MM

## (undated) DEVICE — DRESSING XEROFORM NONADH 1X8IN

## (undated) DEVICE — STRIP MEDI WND CLSR 1/2X4IN

## (undated) DEVICE — PAD ABDOMINAL 8X7.5 STERILE

## (undated) DEVICE — GLOVE SENSICARE PI SURG 6.5

## (undated) DEVICE — CANNULA CLEAR TRAC 8X76X5MM

## (undated) DEVICE — BANDAGE MATRIX HK LOOP 6IN 5YD

## (undated) DEVICE — DRAPE INVISISHIELD U 48X52IN

## (undated) DEVICE — ADHESIVE MASTISOL VIAL 48/BX

## (undated) DEVICE — GLOVE BIOGEL SKINSENSE PI 6.5

## (undated) DEVICE — DRAPE VELYS SATELLITE STATION

## (undated) DEVICE — SOL NACL IRR 3000ML

## (undated) DEVICE — SHAVER SYNNOVATOR PLAT SERIES 4.5MM

## (undated) DEVICE — HANDPIECE INTERPULSE SET

## (undated) DEVICE — BANDAGE COHES LTX TAN 4INX5YD

## (undated) DEVICE — TUBE SUCTION MEDI-VAC STERILE

## (undated) DEVICE — BLADE VELYS OSCILLATING SAW

## (undated) DEVICE — TOURNIQUET SB QC SP 34X4IN

## (undated) DEVICE — BLADE PERFORMANCE SAG 21X90MM